# Patient Record
Sex: MALE | Race: WHITE | NOT HISPANIC OR LATINO | Employment: OTHER | URBAN - METROPOLITAN AREA
[De-identification: names, ages, dates, MRNs, and addresses within clinical notes are randomized per-mention and may not be internally consistent; named-entity substitution may affect disease eponyms.]

---

## 2017-03-21 ENCOUNTER — OFFICE VISIT - RIVER FALLS (OUTPATIENT)
Dept: FAMILY MEDICINE | Facility: CLINIC | Age: 62
End: 2017-03-21

## 2017-03-21 ASSESSMENT — MIFFLIN-ST. JEOR: SCORE: 1916.18

## 2017-03-23 ENCOUNTER — OFFICE VISIT - RIVER FALLS (OUTPATIENT)
Dept: FAMILY MEDICINE | Facility: CLINIC | Age: 62
End: 2017-03-23

## 2017-04-03 ENCOUNTER — OFFICE VISIT - RIVER FALLS (OUTPATIENT)
Dept: FAMILY MEDICINE | Facility: CLINIC | Age: 62
End: 2017-04-03

## 2017-04-03 ASSESSMENT — MIFFLIN-ST. JEOR: SCORE: 1886.25

## 2017-12-28 ENCOUNTER — OFFICE VISIT - RIVER FALLS (OUTPATIENT)
Dept: FAMILY MEDICINE | Facility: CLINIC | Age: 62
End: 2017-12-28

## 2017-12-28 ASSESSMENT — MIFFLIN-ST. JEOR: SCORE: 1896.23

## 2018-06-06 ENCOUNTER — OFFICE VISIT - RIVER FALLS (OUTPATIENT)
Dept: FAMILY MEDICINE | Facility: CLINIC | Age: 63
End: 2018-06-06

## 2018-06-06 ASSESSMENT — MIFFLIN-ST. JEOR: SCORE: 1931.61

## 2019-03-15 ENCOUNTER — COMMUNICATION - RIVER FALLS (OUTPATIENT)
Dept: FAMILY MEDICINE | Facility: CLINIC | Age: 64
End: 2019-03-15

## 2019-03-15 ENCOUNTER — OFFICE VISIT - RIVER FALLS (OUTPATIENT)
Dept: FAMILY MEDICINE | Facility: CLINIC | Age: 64
End: 2019-03-15

## 2019-03-15 LAB
ANION GAP SERPL CALCULATED.3IONS-SCNC: 10 MMOL/L (ref 5–18)
BASOPHILS # BLD MANUAL: 0 10*3/UL
BASOPHILS NFR BLD MANUAL: 0.5 %
BUN SERPL-MCNC: 19 MG/DL (ref 8–25)
BUN/CREAT RATIO - HISTORICAL: 21 (ref 10–20)
CALCIUM SERPL-MCNC: 10 MG/DL (ref 8.5–10.5)
CHLORIDE BLD-SCNC: 106 MMOL/L (ref 98–110)
CO2 SERPL-SCNC: 25 MMOL/L (ref 21–31)
CREAT SERPL-MCNC: 0.92 MG/DL (ref 0.72–1.25)
D DIMER PPP FEU-MCNC: 0.45
EOSINOPHIL # BLD MANUAL: 0.2 10*3/UL
EOSINOPHIL NFR BLD MANUAL: 3.7 %
ERYTHROCYTE [DISTWIDTH] IN BLOOD BY AUTOMATED COUNT: 13.4 % (ref 11.5–15.5)
GFR ESTIMATE EXT - HISTORICAL: >60
GLUCOSE BLD-MCNC: 96 MG/DL (ref 65–100)
HCT VFR BLD AUTO: 44 % (ref 37–53)
HGB BLD-MCNC: 14.9 G/DL (ref 13.5–17.5)
LYMPHOCYTES # BLD MANUAL: 1.7 10*3/UL (ref 0.9–2.9)
LYMPHOCYTES NFR BLD MANUAL: 26.7 %
MCH RBC QN AUTO: 30.2 PG (ref 26–34)
MCHC RBC AUTO-ENTMCNC: 33.9 G/DL (ref 32–36)
MCV RBC AUTO: 89 FL (ref 80–100)
MONOCYTES # BLD MANUAL: 0.5 10*3/UL
MONOCYTES NFR BLD MANUAL: 7.5 %
NEUTROPHILS # BLD MANUAL: 3.8 10*3/UL (ref 1.7–7)
NEUTROPHILS NFR BLD MANUAL: 61.6 %
PLATELET # BLD AUTO: 238 10*3/UL (ref 140–440)
PMV BLD: 9.7 FL (ref 6.5–11)
POTASSIUM BLD-SCNC: 4.4 MMOL/L (ref 3.5–5)
RBC # BLD AUTO: 4.93 10*6/UL (ref 4.3–5.9)
SODIUM SERPL-SCNC: 141 MMOL/L (ref 135–145)
TROPONIN I - HISTORICAL: 0.32 NG/ML
WBC # BLD AUTO: 6.2 10*3/UL (ref 4.5–11)

## 2019-03-15 ASSESSMENT — MIFFLIN-ST. JEOR: SCORE: 1951.56

## 2019-03-16 LAB
CHOLEST SERPL-MCNC: 318 MG/DL
CHOLEST/HDLC SERPL: 4.7 {RATIO}
HDLC SERPL-MCNC: 67 MG/DL
LDLC SERPL CALC-MCNC: 228 MG/DL
NONHDLC SERPL-MCNC: 251 MG/DL
TRIGL SERPL-MCNC: 99 MG/DL

## 2019-05-16 ENCOUNTER — AMBULATORY - RIVER FALLS (OUTPATIENT)
Dept: FAMILY MEDICINE | Facility: CLINIC | Age: 64
End: 2019-05-16

## 2019-05-16 ENCOUNTER — OFFICE VISIT - RIVER FALLS (OUTPATIENT)
Dept: FAMILY MEDICINE | Facility: CLINIC | Age: 64
End: 2019-05-16

## 2019-05-16 ASSESSMENT — MIFFLIN-ST. JEOR: SCORE: 1828.19

## 2019-05-17 LAB
CHOLEST SERPL-MCNC: 189 MG/DL
CHOLEST/HDLC SERPL: 3.8 {RATIO}
HDLC SERPL-MCNC: 50 MG/DL
LDLC SERPL CALC-MCNC: 125 MG/DL
NONHDLC SERPL-MCNC: 139 MG/DL
TRIGL SERPL-MCNC: 47 MG/DL

## 2019-07-23 ENCOUNTER — OFFICE VISIT - RIVER FALLS (OUTPATIENT)
Dept: FAMILY MEDICINE | Facility: CLINIC | Age: 64
End: 2019-07-23

## 2019-08-01 ENCOUNTER — OFFICE VISIT - RIVER FALLS (OUTPATIENT)
Dept: FAMILY MEDICINE | Facility: CLINIC | Age: 64
End: 2019-08-01

## 2019-09-03 ENCOUNTER — OFFICE VISIT - RIVER FALLS (OUTPATIENT)
Dept: FAMILY MEDICINE | Facility: CLINIC | Age: 64
End: 2019-09-03

## 2019-11-20 ENCOUNTER — OFFICE VISIT - RIVER FALLS (OUTPATIENT)
Dept: FAMILY MEDICINE | Facility: CLINIC | Age: 64
End: 2019-11-20

## 2019-11-20 ASSESSMENT — MIFFLIN-ST. JEOR: SCORE: 1827.28

## 2019-11-21 ENCOUNTER — OFFICE VISIT - RIVER FALLS (OUTPATIENT)
Dept: FAMILY MEDICINE | Facility: CLINIC | Age: 64
End: 2019-11-21

## 2019-11-21 LAB
B BURGDOR IGG+IGM SER QL: <0.9
BASOPHILS # BLD MANUAL: 39 10*3/UL (ref 0–200)
BASOPHILS NFR BLD MANUAL: 0.7 %
BUN SERPL-MCNC: 23 MG/DL (ref 7–25)
BUN/CREAT RATIO - HISTORICAL: NORMAL (ref 6–22)
CALCIUM SERPL-MCNC: 9.6 MG/DL (ref 8.6–10.3)
CHLORIDE BLD-SCNC: 105 MMOL/L (ref 98–110)
CO2 SERPL-SCNC: 30 MMOL/L (ref 20–32)
CREAT SERPL-MCNC: 1.1 MG/DL (ref 0.7–1.25)
EGFRCR SERPLBLD CKD-EPI 2021: 72 ML/MIN/1.73M2
EOSINOPHIL # BLD MANUAL: 230 10*3/UL (ref 15–500)
EOSINOPHIL NFR BLD MANUAL: 4.1 %
ERYTHROCYTE [DISTWIDTH] IN BLOOD BY AUTOMATED COUNT: 12.4 % (ref 11–15)
GLUCOSE BLD-MCNC: 86 MG/DL (ref 65–99)
HCT VFR BLD AUTO: 41.6 % (ref 38.5–50)
HGB BLD-MCNC: 14.3 GM/DL (ref 13.2–17.1)
LYMPHOCYTES # BLD MANUAL: 1428 10*3/UL (ref 850–3900)
LYMPHOCYTES NFR BLD MANUAL: 25.5 %
MCH RBC QN AUTO: 31.2 PG (ref 27–33)
MCHC RBC AUTO-ENTMCNC: 34.4 GM/DL (ref 32–36)
MCV RBC AUTO: 90.8 FL (ref 80–100)
MONOCYTES # BLD MANUAL: 347 10*3/UL (ref 200–950)
MONOCYTES NFR BLD MANUAL: 6.2 %
NEUTROPHILS # BLD MANUAL: 3556 10*3/UL (ref 1500–7800)
NEUTROPHILS NFR BLD MANUAL: 63.5 %
PLATELET # BLD AUTO: 278 10*3/UL (ref 140–400)
PMV BLD: 9.9 FL (ref 7.5–12.5)
POTASSIUM BLD-SCNC: 4.5 MMOL/L (ref 3.5–5.3)
RBC # BLD AUTO: 4.58 10*6/UL (ref 4.2–5.8)
SODIUM SERPL-SCNC: 141 MMOL/L (ref 135–146)
WBC # BLD AUTO: 5.6 10*3/UL (ref 3.8–10.8)

## 2019-11-21 ASSESSMENT — MIFFLIN-ST. JEOR: SCORE: 1837.26

## 2019-11-22 ENCOUNTER — COMMUNICATION - RIVER FALLS (OUTPATIENT)
Dept: FAMILY MEDICINE | Facility: CLINIC | Age: 64
End: 2019-11-22

## 2019-12-20 ENCOUNTER — OFFICE VISIT - RIVER FALLS (OUTPATIENT)
Dept: FAMILY MEDICINE | Facility: CLINIC | Age: 64
End: 2019-12-20

## 2019-12-20 ASSESSMENT — MIFFLIN-ST. JEOR: SCORE: 1869.92

## 2020-02-03 ENCOUNTER — OFFICE VISIT - RIVER FALLS (OUTPATIENT)
Dept: FAMILY MEDICINE | Facility: CLINIC | Age: 65
End: 2020-02-03

## 2020-03-11 ENCOUNTER — OFFICE VISIT - RIVER FALLS (OUTPATIENT)
Dept: FAMILY MEDICINE | Facility: CLINIC | Age: 65
End: 2020-03-11

## 2020-03-11 ASSESSMENT — MIFFLIN-ST. JEOR: SCORE: 1867.19

## 2021-01-27 ENCOUNTER — MEDICAL CORRESPONDENCE (OUTPATIENT)
Dept: HEALTH INFORMATION MANAGEMENT | Facility: CLINIC | Age: 66
End: 2021-01-27

## 2021-03-05 ENCOUNTER — OFFICE VISIT - RIVER FALLS (OUTPATIENT)
Dept: FAMILY MEDICINE | Facility: CLINIC | Age: 66
End: 2021-03-05

## 2021-03-05 ASSESSMENT — MIFFLIN-ST. JEOR: SCORE: 1969.71

## 2021-04-02 ENCOUNTER — OFFICE VISIT - RIVER FALLS (OUTPATIENT)
Dept: FAMILY MEDICINE | Facility: CLINIC | Age: 66
End: 2021-04-02

## 2021-04-02 ENCOUNTER — AMBULATORY - RIVER FALLS (OUTPATIENT)
Dept: FAMILY MEDICINE | Facility: CLINIC | Age: 66
End: 2021-04-02

## 2021-04-02 LAB
BUN SERPL-MCNC: 16 MG/DL (ref 7–25)
BUN/CREAT RATIO - HISTORICAL: ABNORMAL (ref 6–22)
CALCIUM SERPL-MCNC: 9.4 MG/DL (ref 8.6–10.3)
CHLORIDE BLD-SCNC: 104 MMOL/L (ref 98–110)
CO2 SERPL-SCNC: 26 MMOL/L (ref 20–32)
CREAT SERPL-MCNC: 0.8 MG/DL (ref 0.7–1.25)
EGFRCR SERPLBLD CKD-EPI 2021: 95 ML/MIN/1.73M2
GLUCOSE BLD-MCNC: 111 MG/DL (ref 65–99)
POTASSIUM BLD-SCNC: 4.4 MMOL/L (ref 3.5–5.3)
SODIUM SERPL-SCNC: 138 MMOL/L (ref 135–146)

## 2021-04-02 ASSESSMENT — MIFFLIN-ST. JEOR: SCORE: 1946.12

## 2021-04-03 LAB
CHOLEST SERPL-MCNC: 185 MG/DL
CHOLEST/HDLC SERPL: 2.6 {RATIO}
HDLC SERPL-MCNC: 72 MG/DL
LDLC SERPL CALC-MCNC: 99 MG/DL
NONHDLC SERPL-MCNC: 113 MG/DL
TRIGL SERPL-MCNC: 46 MG/DL

## 2021-04-04 ENCOUNTER — NURSE TRIAGE (OUTPATIENT)
Dept: NURSING | Facility: CLINIC | Age: 66
End: 2021-04-04

## 2021-04-04 NOTE — TELEPHONE ENCOUNTER
"\"I had my ear (R) cleaned out yesterday and they removed quite a bit of wax.   Today that ear is painful(rates a 5/10) and that side of my jaw hurts.   It's almost like I have lock jaw. I can open it but it hurts to do so.\"  Denies fever or other sx  Triaged, gave home care advice to try, if sx worsen advised to be seen today within 4 hours.  If improvement advised to call PCP in am 4/5.  Call back if needed.  Melissa Mejia RN [facility]    COVID 19 Nurse Triage Plan/Patient Instructions    Please be aware that novel coronavirus (COVID-19) may be circulating in the community. If you develop symptoms such as fever, cough, or SOB or if you have concerns about the presence of another infection including coronavirus (COVID-19), please contact your health care provider or visit [email or web address].     Disposition/Instructions    In-Person Visit with provider recommended. Reference Visit Selection Guide.    Thank you for taking steps to prevent the spread of this virus.  o Limit your contact with others.  o Wear a simple mask to cover your cough.  o Wash your hands well and often.    Resources    [facility]: About COVID-19: [email or web address]    CDC: What to Do If You're Sick: www.cdc.gov/coronavirus/2019-ncov/about/steps-when-sick.html    CDC: Ending Home Isolation: www.cdc.gov/coronavirus/2019-ncov/hcp/disposition-in-home-patients.html     CDC: Caring for Someone: www.cdc.gov/coronavirus/2019-ncov/if-you-are-sick/care-for-someone.html     [facility]: Interim Guidance for Hospital Discharge to Home: www.health.[facility].mn.us/diseases/coronavirus/hcp/hospdischarge.pdf    [facility] clinical trials (COVID-19 research studies): clinicalaffairs.[facility]/n-clinical-trials     Below are the COVID-19 hotlines at the [place] Department of Health ([facility]). Interpreters are available.   o For health questions: Call 763-301-8079 or 1-830.179.9378 (7 a.m. to 7 p.m.)  o For questions " about schools and childcare: Call 388-863-8372 or 1-229.214.4121 (7 a.m. to 7 p.m.)                     Additional Information    Negative: [1] Stiff neck (unable to touch chin to chest) AND [2] fever    Negative: [1] Bony area of skull behind the ear is pink or swollen AND [2] fever    Negative: Fever > 104 F (40 C)    Negative: Patient sounds very sick or weak to the triager    [1] SEVERE pain AND [2] not improved 2 hours after taking analgesic medication (e.g., ibuprofen or acetaminophen)    Protocols used: EARACHE-A-AH

## 2021-04-05 ENCOUNTER — COMMUNICATION - RIVER FALLS (OUTPATIENT)
Dept: FAMILY MEDICINE | Facility: CLINIC | Age: 66
End: 2021-04-05

## 2021-04-05 ENCOUNTER — OFFICE VISIT - RIVER FALLS (OUTPATIENT)
Dept: FAMILY MEDICINE | Facility: CLINIC | Age: 66
End: 2021-04-05

## 2021-04-05 ASSESSMENT — MIFFLIN-ST. JEOR: SCORE: 1950.66

## 2021-05-21 ENCOUNTER — OFFICE VISIT - RIVER FALLS (OUTPATIENT)
Dept: FAMILY MEDICINE | Facility: CLINIC | Age: 66
End: 2021-05-21

## 2021-05-21 ASSESSMENT — MIFFLIN-ST. JEOR: SCORE: 1920.27

## 2021-09-21 ENCOUNTER — OFFICE VISIT - RIVER FALLS (OUTPATIENT)
Dept: FAMILY MEDICINE | Facility: CLINIC | Age: 66
End: 2021-09-21

## 2021-12-28 ENCOUNTER — AMBULATORY - RIVER FALLS (OUTPATIENT)
Dept: FAMILY MEDICINE | Facility: CLINIC | Age: 66
End: 2021-12-28

## 2022-01-18 ENCOUNTER — COMMUNICATION - RIVER FALLS (OUTPATIENT)
Dept: FAMILY MEDICINE | Facility: CLINIC | Age: 67
End: 2022-01-18

## 2022-01-27 ENCOUNTER — OFFICE VISIT - RIVER FALLS (OUTPATIENT)
Dept: FAMILY MEDICINE | Facility: CLINIC | Age: 67
End: 2022-01-27

## 2022-01-28 LAB
BUN SERPL-MCNC: 17 MG/DL (ref 7–25)
BUN/CREAT RATIO - HISTORICAL: NORMAL (ref 6–22)
CALCIUM SERPL-MCNC: 9.4 MG/DL (ref 8.6–10.3)
CHLORIDE BLD-SCNC: 104 MMOL/L (ref 98–110)
CO2 SERPL-SCNC: 27 MMOL/L (ref 20–32)
CREAT SERPL-MCNC: 0.93 MG/DL (ref 0.7–1.25)
EGFRCR SERPLBLD CKD-EPI 2021: 86 ML/MIN/1.73M2
ERYTHROCYTE [DISTWIDTH] IN BLOOD BY AUTOMATED COUNT: 12 % (ref 11–15)
GLUCOSE BLD-MCNC: 87 MG/DL (ref 65–99)
HCT VFR BLD AUTO: 43.1 % (ref 38.5–50)
HGB BLD-MCNC: 14.7 GM/DL (ref 13.2–17.1)
MCH RBC QN AUTO: 30.9 PG (ref 27–33)
MCHC RBC AUTO-ENTMCNC: 34.1 GM/DL (ref 32–36)
MCV RBC AUTO: 90.5 FL (ref 80–100)
PLATELET # BLD AUTO: 257 10*3/UL (ref 140–400)
PMV BLD: 10.3 FL (ref 7.5–12.5)
POTASSIUM BLD-SCNC: 4.4 MMOL/L (ref 3.5–5.3)
RBC # BLD AUTO: 4.76 10*6/UL (ref 4.2–5.8)
SODIUM SERPL-SCNC: 139 MMOL/L (ref 135–146)
WBC # BLD AUTO: 8.3 10*3/UL (ref 3.8–10.8)

## 2022-01-31 ENCOUNTER — COMMUNICATION - RIVER FALLS (OUTPATIENT)
Dept: FAMILY MEDICINE | Facility: CLINIC | Age: 67
End: 2022-01-31

## 2022-02-08 ENCOUNTER — OFFICE VISIT - RIVER FALLS (OUTPATIENT)
Dept: FAMILY MEDICINE | Facility: CLINIC | Age: 67
End: 2022-02-08

## 2022-02-11 VITALS
HEART RATE: 60 BPM | BODY MASS INDEX: 30.46 KG/M2 | HEIGHT: 73 IN | HEIGHT: 73 IN | WEIGHT: 236.4 LBS | WEIGHT: 229.8 LBS | BODY MASS INDEX: 31.33 KG/M2 | SYSTOLIC BLOOD PRESSURE: 128 MMHG | SYSTOLIC BLOOD PRESSURE: 130 MMHG | HEART RATE: 68 BPM | TEMPERATURE: 98 F | TEMPERATURE: 98.4 F | DIASTOLIC BLOOD PRESSURE: 76 MMHG | DIASTOLIC BLOOD PRESSURE: 86 MMHG

## 2022-02-11 VITALS
HEIGHT: 73 IN | SYSTOLIC BLOOD PRESSURE: 104 MMHG | HEART RATE: 51 BPM | HEIGHT: 73 IN | BODY MASS INDEX: 28.76 KG/M2 | DIASTOLIC BLOOD PRESSURE: 90 MMHG | WEIGHT: 217 LBS | HEART RATE: 59 BPM | BODY MASS INDEX: 32.37 KG/M2 | TEMPERATURE: 97.1 F | SYSTOLIC BLOOD PRESSURE: 156 MMHG | WEIGHT: 244.2 LBS | DIASTOLIC BLOOD PRESSURE: 66 MMHG

## 2022-02-11 VITALS
HEART RATE: 60 BPM | SYSTOLIC BLOOD PRESSURE: 122 MMHG | SYSTOLIC BLOOD PRESSURE: 116 MMHG | TEMPERATURE: 97.8 F | BODY MASS INDEX: 27.97 KG/M2 | WEIGHT: 212 LBS | HEART RATE: 52 BPM | DIASTOLIC BLOOD PRESSURE: 68 MMHG | DIASTOLIC BLOOD PRESSURE: 78 MMHG

## 2022-02-11 VITALS
BODY MASS INDEX: 30.08 KG/M2 | DIASTOLIC BLOOD PRESSURE: 79 MMHG | WEIGHT: 228 LBS | HEART RATE: 60 BPM | TEMPERATURE: 98.1 F | SYSTOLIC BLOOD PRESSURE: 134 MMHG

## 2022-02-11 VITALS
SYSTOLIC BLOOD PRESSURE: 132 MMHG | DIASTOLIC BLOOD PRESSURE: 68 MMHG | SYSTOLIC BLOOD PRESSURE: 106 MMHG | HEIGHT: 73 IN | BODY MASS INDEX: 29.03 KG/M2 | HEART RATE: 73 BPM | WEIGHT: 216.8 LBS | WEIGHT: 212 LBS | TEMPERATURE: 98.6 F | HEART RATE: 65 BPM | BODY MASS INDEX: 28.1 KG/M2 | HEART RATE: 60 BPM | BODY MASS INDEX: 28.73 KG/M2 | HEIGHT: 73 IN | DIASTOLIC BLOOD PRESSURE: 72 MMHG | HEIGHT: 73 IN | WEIGHT: 219 LBS | SYSTOLIC BLOOD PRESSURE: 115 MMHG | DIASTOLIC BLOOD PRESSURE: 75 MMHG

## 2022-02-11 VITALS
DIASTOLIC BLOOD PRESSURE: 72 MMHG | TEMPERATURE: 98.9 F | WEIGHT: 226.2 LBS | HEIGHT: 73 IN | SYSTOLIC BLOOD PRESSURE: 122 MMHG | HEART RATE: 59 BPM | BODY MASS INDEX: 29.98 KG/M2

## 2022-02-11 VITALS
DIASTOLIC BLOOD PRESSURE: 88 MMHG | OXYGEN SATURATION: 95 % | HEART RATE: 60 BPM | HEIGHT: 73 IN | SYSTOLIC BLOOD PRESSURE: 138 MMHG | BODY MASS INDEX: 30.75 KG/M2 | WEIGHT: 232 LBS

## 2022-02-11 VITALS
WEIGHT: 239.8 LBS | HEART RATE: 64 BPM | HEIGHT: 73 IN | BODY MASS INDEX: 31.78 KG/M2 | DIASTOLIC BLOOD PRESSURE: 72 MMHG | SYSTOLIC BLOOD PRESSURE: 124 MMHG

## 2022-02-11 VITALS
BODY MASS INDEX: 32.34 KG/M2 | HEART RATE: 60 BPM | RESPIRATION RATE: 16 BRPM | HEIGHT: 73 IN | TEMPERATURE: 97.4 F | BODY MASS INDEX: 32.2 KG/M2 | WEIGHT: 248.2 LBS | DIASTOLIC BLOOD PRESSURE: 94 MMHG | DIASTOLIC BLOOD PRESSURE: 80 MMHG | BODY MASS INDEX: 32.89 KG/M2 | DIASTOLIC BLOOD PRESSURE: 76 MMHG | HEART RATE: 72 BPM | OXYGEN SATURATION: 97 % | SYSTOLIC BLOOD PRESSURE: 160 MMHG | TEMPERATURE: 98.3 F | HEIGHT: 73 IN | WEIGHT: 244 LBS | TEMPERATURE: 98.4 F | SYSTOLIC BLOOD PRESSURE: 126 MMHG | SYSTOLIC BLOOD PRESSURE: 149 MMHG | WEIGHT: 243 LBS | HEIGHT: 73 IN

## 2022-02-11 VITALS
HEART RATE: 81 BPM | BODY MASS INDEX: 29.9 KG/M2 | SYSTOLIC BLOOD PRESSURE: 135 MMHG | HEIGHT: 73 IN | WEIGHT: 225.6 LBS | TEMPERATURE: 98.2 F | DIASTOLIC BLOOD PRESSURE: 76 MMHG

## 2022-02-12 VITALS
DIASTOLIC BLOOD PRESSURE: 93 MMHG | WEIGHT: 237.3 LBS | HEART RATE: 66 BPM | HEIGHT: 73 IN | BODY MASS INDEX: 31.45 KG/M2 | SYSTOLIC BLOOD PRESSURE: 160 MMHG

## 2022-02-16 NOTE — PROGRESS NOTES
Patient:   TAMAR KENNEDY            MRN: [id number]            FIN: [id number]               Age:   66 years     Sex:  Male     :  1955   Associated Diagnoses:   History of non-ST elevation myocardial infarction (NSTEMI); Elevated lipids; Coronary artery disease; Asthma; HTN (hypertension)   Author:   Quinn Al MD      Visit Information      Date of Service: 2021 11:03 am  Performing Location: [facility]  Encounter#: [id number]      Primary Care Provider (PCP):  Quinn Al MD    NPI# [id number]      Referring Provider:  Quinn Al MD# [id number]      Chief Complaint   2021 11:08 AM CDT   HTN/CAD f/u and refills.        History of Present Illness   Patient here for follow-up overall has been doing well.  Continues to be very active.  He does to light she sometimes he is pain in his right lower lateral thigh anxiety hip replacement and.  No chest pain no shortness of breath.           Review of Systems   Constitutional:  Negative.    Eye:  Negative.    Ear/Nose/Mouth/Throat:  Negative.    Respiratory:  Negative.    Cardiovascular:  Negative.    Gastrointestinal:  Negative.    Genitourinary:  Negative.    Hematology/Lymphatics:  Negative.    Endocrine:  Negative.    Immunologic:  Negative.    Musculoskeletal:  Negative except as documented in history of present illness.    Integumentary:  Negative.    Neurologic:  Negative.       Health Status   Allergies:    Allergic Reactions (Selected)  No Known Medication Allergies   Medications:  (Selected)   Prescriptions  Prescribed  Advair Diskus 250 mcg-50 mcg inhalation powder: 1 puff(s), Inhale, bid, # 180 EA, 1 Refill(s), Type: Maintenance, Pharmacy: [facility] #[id number], 1 puff(s) Inhale bid, 73, in, 21 7:56:00 CDT, Height Measured, 228, lb, 21 11:08:00 CDT, Weight Measured  Ventolin HFA 90 mcg/inh inhalation aerosol: 2 puff(s), Inhale, q4-6 hrs, # 3 EA, 1 Refill(s), Type: Soft Stop,  Pharmacy: [facility] #[id number], 2 puff(s) Inhale q4-6 hrs, 73, in, 05/21/21 7:56:00 CDT, Height Measured, 228, lb, 09/21/21 11:08:00 CDT, Weight Measured  amLODIPine 5 mg oral tablet: = 1 tab(s) ( 5 mg ), Oral, daily, # 90 tab(s), 1 Refill(s), Type: Maintenance, Pharmacy: [facility] #[id number], 1 tab(s) Oral daily, 73, in, 05/21/21 7:56:00 CDT, Height Measured, 228, lb, 09/21/21 11:08:00 CDT, Weight Measured  atorvastatin 80 mg oral tablet: = 1 tab(s) ( 80 mg ), Oral, daily, # 90 tab(s), 1 Refill(s), Type: Maintenance, Pharmacy: [facility] #[id number], 1 tab(s) Oral daily, 73, in, 05/21/21 7:56:00 CDT, Height Measured, 228, lb, 09/21/21 11:08:00 CDT, Weight Measured  indomethacin 50 mg oral capsule: = 1 cap(s) ( 50 mg ), Oral, tid, PRN: for gout pain, # 30 cap(s), 0 Refill(s), Type: Maintenance, Pharmacy: [facility] #[id number], 1 cap(s) Oral tid,PRN:for gout pain, 73, in, 05/21/21 7:56:00 CDT, Height Measured, 237.3, lb, 05/21/21 7:56:00 CD...  losartan 50 mg oral tablet: = 1 tab(s) ( 50 mg ), Oral, daily, # 90 tab(s), 1 Refill(s), Type: Maintenance, Pharmacy: [facility] #[id number], 1 tab(s) Oral daily, 73, in, 05/21/21 7:56:00 CDT, Height Measured, 228, lb, 09/21/21 11:08:00 CDT, Weight Measured  Documented Medications  Documented  acetaminophen 325 mg oral tablet: = 2 tab(s) ( 650 mg ), Oral, q4 hrs, 0 Refill(s), Type: Maintenance  aspirin 81 mg oral tablet, chewable: = 1 tab(s) ( 81 mg ), PO, Daily, # 30 tab(s), 0 Refill(s), Type: Maintenance  ibuprofen 200 mg oral tablet: = 2 tab(s) ( 400 mg ), Oral, q4 hrs, PRN: for pain, # 120 tab(s), 0 Refill(s), Type: Maintenance  naproxen sodium 220 mg oral tablet: = 1 tab(s) ( 220 mg ), Oral, q8 hrs, PRN: for pain, # 30 tab(s), 0 Refill(s), Type: Maintenance  nitroglycerin 0.4 mg sublingual tablet: 0 Refill(s), Type: Soft Stop   Problem list:    All Problems (Selected)  Allergic rhinitis / SNOMED CT 135887713 / Confirmed  HTN (hypertension) /  SNOMED CT 4251300837 / Confirmed  Elevated lipids / SNOMED CT 902155711 / Confirmed  Obesity / SNOMED CT 0149798484 / Probable  History of non-ST elevation myocardial infarction (NSTEMI) / SNOMED CT 0378182224 / Confirmed  Osteoarthritis of right hip / SNOMED CT 4964807161 / Confirmed  Asthma / SNOMED CT [id number] / Confirmed  Family history of heart disease in male family member before age 55 / SNOMED CT 049755760 / Confirmed  Coronary artery disease / SNOMED CT 16866390 / Confirmed      Histories   Past Medical History:    Active  History of non-ST elevation myocardial infarction (NSTEMI) (2712281508): Onset on 3/15/2019 at 63 years.  Resolved  Inpatient stay ([id number]): Onset on 3/15/2019 at 63 years.  Resolved on 3/18/2019 at 63 years.  Comments:  3/27/2019 CDT 7:54 AM CDT - Shirlene Rodrigues  @[place], [place] - NSTEMI   Family History:    MI - Myocardial infarction  Father (Unknown, )     Procedure history:    Arthroscopy of hip (SNOMED CT 240537696) performed by Christopher Abraham MD on 2019 at 64 Years.  Comments:  2019 1:32 PM CST - Janel Momin  Total right.  Endovascular insertion of drug eluting stent (SNOMED CT 8211217041) on 3/18/2019 at 63 Years.  Comments:  3/27/2019 7:58 AM CDT - Shirlene Rodrigues  x1 to LAD   Social History:        Electronic Cigarette/Vaping Assessment            Electronic Cigarette Use: Never.      Alcohol Assessment            Current, Beer (12 oz), Wine (5 oz), 3-5 times per week, 1 drinks/episode average.  2 drinks/episode maximum.      Tobacco Assessment            Former smoker, quit more than 30 days ago, Cigars      Substance Abuse Assessment            Past      Employment and Education Assessment            Employed, Work/School description: .      Home and Environment Assessment            Spouse/Partner name: Cielo Polk.      Nutrition and Health Assessment            Type of diet: Regular.       Exercise and Physical Activity Assessment            Exercise frequency: 5-6 times/week.  Exercise type: Walking, Weight lifting.      Sexual Assessment            Sexually active: Yes.  Sexual orientation: Heterosexual.        Physical Examination   Measurements from flowsheet : Measurements   9/21/2021 11:08 AM CDT   Weight Measured - Standard                228 lb     General:  Alert and oriented, No acute distress.    HENT:  No pharyngeal erythema.    Neck:  Supple, Non-tender, No carotid bruit, No jugular venous distention, No lymphadenopathy, No thyromegaly.    Respiratory:  Lungs are clear to auscultation, Respirations are non-labored.    Cardiovascular:  Normal rate, Regular rhythm, No murmur, No edema.    Gastrointestinal:  Soft, Non-tender, No organomegaly.    Musculoskeletal:  degenerative changes noted.    Integumentary:  No rash.    Neurologic:  Alert, Oriented.       Impression and Plan   Diagnosis     History of non-ST elevation myocardial infarction (NSTEMI) (KVZ34-EL I25.2).     Elevated lipids (HKH59-QH E78.5).     Coronary artery disease (DJQ88-BS I25.10).     Asthma (LVB72-TS J45.909).     HTN (hypertension) (GVR35-TU I10).     Course:  Progressing as expected.    Plan:  1.  Hypertension under reasonable control with losartan and amlodipine we will make no changes #2 hyperlipidemia on atorvastatin  3.  History of non-STEMI MI status post stent placement in LAD  4.  History of right hip replacement occasional thigh pain by #5 asthma stable on Advair with rno use of albuterol  .    Patient Instructions:       Counseled: Patient, Regarding diagnosis, Regarding treatment, Regarding medications, Activity.    No qualifying data available.

## 2022-02-16 NOTE — NURSING NOTE
Asthma Assessment Entered On:  3/19/2019 8:13 AM CDT    Performed On:  3/15/2019 8:13 AM CDT by Jimena Rich MA               History   ED Visits Past Month Asthma :   0   ED Visits Past Year Asthma :   0   Hospitalizations Past Year Asthma :   0   Jimena Rich MA - 3/19/2019 8:13 AM CDT

## 2022-02-16 NOTE — NURSING NOTE
Comprehensive Intake Entered On:  9/3/2019 10:13 AM CDT    Performed On:  9/3/2019 10:11 AM CDT by Demi Lujan CMA               Summary   Chief Complaint :   right hip pain - having hip replaced in October and would like to see if he can get an injection    Weight Measured :   212 lb(Converted to: 212 lb 0 oz, 96.16 kg)    Systolic Blood Pressure :   120 mmHg   Diastolic Blood Pressure :   70 mmHg   Mean Arterial Pressure :   87 mmHg   Peripheral Pulse Rate :   60 bpm   Temperature Tympanic :   98.6 DegF(Converted to: 37.0 DegC)    Demi Lujan CMA - 9/3/2019 10:11 AM CDT

## 2022-02-16 NOTE — LETTER
(Inserted Image. Unable to display)   September 08, 2021  [place] [address]          Dear TAMAR,      Thank you for selecting [facility] for your healthcare needs.    Our records indicate you are due for the following services:     Hypertension check ~ please remember to bring your at-home blood pressure readings with you to your appointment.     (FYI   Regarding office visits: In some instances, a video visit or telephone visit may be offered as an option.)        To schedule an appointment or if you have further questions, please contact your clinic at [phone].      Powered by [employer]    Sincerely,    Quinn Al M.D.

## 2022-02-16 NOTE — NURSING NOTE
Comprehensive Intake Entered On:  4/2/2021 10:46 AM CDT    Performed On:  4/2/2021 10:43 AM CDT by Khushbu Du               Summary   Chief Complaint :   Right ear pain x1-2 weeks.  f/up HTN   Weight Measured :   243.0 lb(Converted to: 243 lb 0 oz, 110.223 kg)    Height Measured :   73 in(Converted to: 6 ft 1 in, 185.42 cm)    Body Mass Index :   32.06 kg/m2 (HI)    Body Surface Area :   2.38 m2   Systolic Blood Pressure :   160 mmHg (HI)    Diastolic Blood Pressure :   94 mmHg (HI)    Mean Arterial Pressure :   116 mmHg   Peripheral Pulse Rate :   549 bpm (HI)    BP Method :   Electronic   HR Method :   Electronic   Temperature Tympanic :   97.4 DegF(Converted to: 36.3 DegC)  (LOW)    Khushbu Du - 4/2/2021 10:43 AM CDT   Health Status   Allergies Verified? :   Yes   Medication History Verified? :   Yes   Tobacco Use? :   Former smoker   Khushbu Du - 4/2/2021 10:43 AM CDT   Meds / Allergies   (As Of: 4/2/2021 10:46:06 AM CDT)   Allergies (Active)   No Known Medication Allergies  Estimated Onset Date:   Unspecified ; Created By:   Demi Lujan CMA; Reaction Status:   Active ; Category:   Drug ; Substance:   No Known Medication Allergies ; Type:   Allergy ; Updated By:   Demi Lujan CMA; Reviewed Date:   3/5/2021 11:56 AM CST        Medication List   (As Of: 4/2/2021 10:46:06 AM CDT)   Prescription/Discharge Order    fluticasone-salmeterol  :   fluticasone-salmeterol ; Status:   Prescribed ; Ordered As Mnemonic:   Advair Diskus 250 mcg-50 mcg inhalation powder ; Simple Display Line:   1 puff(s), Inhale, bid, 180 EA, 1 Refill(s) ; Ordering Provider:   Quinn Al MD; Catalog Code:   fluticasone-salmeterol ; Order Dt/Tm:   3/5/2021 11:37:58 AM CST          losartan  :   losartan ; Status:   Prescribed ; Ordered As Mnemonic:   losartan 50 mg oral tablet ; Simple Display Line:   50 mg, 1 tab(s), Oral, daily, 90 tab(s), 1 Refill(s) ; Ordering Provider:   Quinn Al MD; Catalog Code:    losartan ; Order Dt/Tm:   3/5/2021 11:34:49 AM CST          atorvastatin  :   atorvastatin ; Status:   Prescribed ; Ordered As Mnemonic:   atorvastatin 80 mg oral tablet ; Simple Display Line:   80 mg, 1 tab(s), Oral, daily, 90 tab(s), 3 Refill(s) ; Ordering Provider:   Quinn Al MD; Catalog Code:   atorvastatin ; Order Dt/Tm:   3/11/2020 9:12:17 AM CDT          albuterol  :   albuterol ; Status:   Prescribed ; Ordered As Mnemonic:   Ventolin HFA 90 mcg/inh inhalation aerosol ; Simple Display Line:   2 puff(s), Inhale, q4-6 hrs, 3 EA, 3 Refill(s) ; Ordering Provider:   Quinn Al MD; Catalog Code:   albuterol ; Order Dt/Tm:   3/11/2020 9:11:06 AM CDT            Home Meds    aspirin  :   aspirin ; Status:   Documented ; Ordered As Mnemonic:   aspirin 81 mg oral tablet, chewable ; Simple Display Line:   81 mg, 1 tab(s), PO, Daily, 30 tab(s), 0 Refill(s) ; Catalog Code:   aspirin ; Order Dt/Tm:   3/19/2019 1:54:21 PM CDT            ID Risk Screen   Recent Travel History :   No recent travel   Family Member Travel History :   No recent travel   Other Exposure to Infectious Disease :   Unknown   COVID-19 Testing Status :   No positive COVID-19 test   Khushbu Du - 4/2/2021 10:43 AM CDT   Social History   Social History   (As Of: 4/2/2021 10:46:06 AM CDT)   Alcohol:        Current, Beer (12 oz), Wine (5 oz), 3-5 times per week, 1 drinks/episode average.  2 drinks/episode maximum.   (Last Updated: 3/5/2015 12:34:58 PM CST by uLisa Null CMA)          Tobacco:        Former smoker, quit more than 30 days ago, Cigars   (Last Updated: 3/5/2021 11:26:49 AM CST by Khushbu Du)          Electronic Cigarette/Vaping:        Electronic Cigarette Use: Never.   (Last Updated: 3/5/2021 11:26:53 AM CST by Khushbu Du)          Substance Abuse:        Past   (Last Updated: 3/5/2015 12:35:17 PM CST by Luisa Null CMA)          Employment/School:        Employed, Work/School description: [employer]    (Last Updated: 3/5/2015 12:35:30 PM CST by Chaka ORNELAS Luisa)          Home/Environment:        Spouse/Partner name: Cielo Polk.   (Last Updated: 3/5/2015 12:36:01 PM CST by Chaka ORNELAS Luisa)          Nutrition/Health:        Type of diet: Regular.   (Last Updated: 3/5/2015 12:36:10 PM CST by Chaka ORNELAS Luisa)          Exercise:        Exercise frequency: 5-6 times/week.  Exercise type: Walking, Weight lifting.   (Last Updated: 3/5/2015 12:36:45 PM CST by Chaka ORNELAS Luisa)          Sexual:        Sexually active: Yes.  Sexual orientation: Heterosexual.   (Last Updated: 3/5/2015 12:37:02 PM CST by Chaka ORNELAS Luisa)

## 2022-02-16 NOTE — PROGRESS NOTES
Chief Complaint  groin strain onset Friday  History of Present Illness  Pain in the right groin. Started Saturday and increased over the weekend. Denies any obvious trauma or trigger. Friday working hard on removing a shrub. Does not hurt at rest.  Review of Systems  Denies fevers, vomiting. No weakness in the legs.  No urine changes.?  Slept intermittently last night.  Problem List/Past Medical History  Ongoing  Asthma  Obesity  Resolved  No resolved problems  Procedure/Surgical History  Home Medications  Qvar 40 mcg/inh inhalation aerosol, inh, bid  Ventolin HFA 90 mcg/inh inhalation aerosol, 2 puff(s), inh, q4-6 hrs, 11 refills  Allergies  No known allergies  Social History  Alcohol  Current, Beer (12 oz), Wine (5 oz), 3-5 times per week, 1 drinks/episode average. 2 drinks/episode maximum.  Exercise and Physical Activity  Exercise frequency: 5-6 times/week. Exercise type: Walking, Weight lifting.  Family History  Immunizations  Physical Exam  Vitals & Measurements  T:?98.4?(Tympanic)?  HR:?60?(Peripheral)?  BP:?128/86?  HT:?73?in?  WT:?229.8?lb?  BMI:?30.32?  General: no acute distress  Musculoskeletal: At rest is good but any movement causes pain. ?Point tenderness on the right inner thigh at the pubic bone  Genitourinary: Normal testicles and scrotum. ?No inguinal tenderness  Abdomen: soft, nontender, nondistended  Lab Results  Diagnostic Results  Doubt inguinal hernia or testicular torsion.  Assessment/Plan  Musculoskeletal pain  Likely muscle strain and will start Naprosyn 500 mg twice daily. ?Follow-up if not improving.?

## 2022-02-16 NOTE — TELEPHONE ENCOUNTER
---------------------  From: Salma Ramos   Sent: 4/16/2020 9:09:08 AM CDT  Subject: Blood thinner      Phone Message    PCP:   SHARON                          Time of Call:  845am                                        Person Calling:  Pt  Phone number:       Returned call at: 907am    Note:   Pt called wondering about refills on Clopidogrel. Informed him that he would need to contact Dr. Thakur's office. Gave him the phone number. He will contact her office to discuss.     Last office visit and reason:  03/11/2020; IVD/Asthma

## 2022-02-16 NOTE — NURSING NOTE
Comprehensive Intake Entered On:  5/16/2019 8:42 AM CDT    Performed On:  5/16/2019 8:38 AM CDT by Chantell Garcia               Summary   Chief Complaint :   Post op with cardio.    Weight Measured :   217 lb(Converted to: 217 lb 0 oz, 98.43 kg)    Height Measured :   73 in(Converted to: 6 ft 1 in, 185.42 cm)    Body Mass Index :   28.63 kg/m2 (HI)    Body Surface Area :   2.25 m2   Systolic Blood Pressure :   104 mmHg   Diastolic Blood Pressure :   66 mmHg   Mean Arterial Pressure :   79 mmHg   Peripheral Pulse Rate :   59 bpm (LOW)    Chantell Garcia - 5/16/2019 8:38 AM CDT   Meds / Allergies   (As Of: 5/16/2019 8:42:58 AM CDT)   Allergies (Active)   No known allergies  Estimated Onset Date:   Unspecified ; Created By:   Pily Cooper CMA; Reaction Status:   Active ; Category:   Drug ; Substance:   No known allergies ; Type:   Allergy ; Updated By:   Pily Cooper CMA; Reviewed Date:   5/16/2019 8:42 AM CDT        Medication List   (As Of: 5/16/2019 8:42:58 AM CDT)   Prescription/Discharge Order    albuterol  :   albuterol ; Status:   Prescribed ; Ordered As Mnemonic:   Ventolin HFA 90 mcg/inh inhalation aerosol ; Simple Display Line:   See Instructions, INHALE 2 PUFFS BY MOUTH EVERY 4 TO 6 HOURS, 1 EA, 11 Refill(s) ; Ordering Provider:   Seth Mckeon MD; Catalog Code:   albuterol ; Order Dt/Tm:   6/6/2018 11:04:15 AM            Home Meds    aspirin  :   aspirin ; Status:   Documented ; Ordered As Mnemonic:   aspirin 81 mg oral tablet, chewable ; Simple Display Line:   81 mg, 1 tab(s), PO, Daily, 30 tab(s), 0 Refill(s) ; Catalog Code:   aspirin ; Order Dt/Tm:   3/19/2019 1:54:21 PM          atorvastatin  :   atorvastatin ; Status:   Documented ; Ordered As Mnemonic:   atorvastatin 40 mg oral tablet ; Simple Display Line:   40 mg, 1 tab(s), Oral, daily, 0 Refill(s) ; Catalog Code:   atorvastatin ; Order Dt/Tm:   3/19/2019 1:56:39 PM          ticagrelor  :   ticagrelor ; Status:    Documented ; Ordered As Mnemonic:   ticagrelor 90 mg oral tablet ; Simple Display Line:   90 mg, 1 tab(s), Oral, bid, 0 Refill(s) ; Catalog Code:   ticagrelor ; Order Dt/Tm:   3/19/2019 1:58:36 PM          lisinopril  :   lisinopril ; Status:   Documented ; Ordered As Mnemonic:   lisinopril 10 mg oral tablet ; Simple Display Line:   10 mg, 1 tab(s), Oral, daily, 0 Refill(s) ; Catalog Code:   lisinopril ; Order Dt/Tm:   3/19/2019 1:57:49 PM

## 2022-02-16 NOTE — PROGRESS NOTES
Patient:   TAMAR KENNEDY            MRN: [id number]            FIN: [id number]               Age:   65 years     Sex:  Male     :  1955   Associated Diagnoses:   Right otitis externa   Author:   Christopher Blanco PA-C      Chief Complaint   2021 10:37 AM CDT    Pt here for FU on Right ear pain and from  and  urgent care telephone visit for RIght jaw pain.  Pt states ear and jaw pain.        History of Present Illness   Chief complaint and symptoms noted above and confirmed with patient   as above, he had his ears flushed on , on  developed right ear pain radiating to right jaw  has been using tylenol and heat      Review of Systems   Constitutional:  Negative.    Ear/Nose/Mouth/Throat:       Ear pain: Right.    Respiratory:  Negative.    Gastrointestinal:  Negative.       Health Status   Allergies:    Allergic Reactions (Selected)  No Known Medication Allergies   Medications:  (Selected)   Prescriptions  Prescribed  Advair Diskus 250 mcg-50 mcg inhalation powder: 1 puff(s), Inhale, bid, # 180 EA, 1 Refill(s), Type: Maintenance, Pharmacy: [facility] #[id number], 1 puff(s) Inhale bid, 73, in, 21 10:43:00 CDT, Height Measured, 243, lb, 21 10:43:00 CDT, Weight Measured  Ventolin HFA 90 mcg/inh inhalation aerosol: 2 puff(s), Inhale, q4-6 hrs, # 3 EA, 1 Refill(s), Type: Soft Stop, Pharmacy: [facility] #[id number], 2 puff(s) Inhale q4-6 hrs, 73, in, 21 10:43:00 CDT, Height Measured, 243, lb, 21 10:43:00 CDT, Weight Measured  amLODIPine 5 mg oral tablet: = 1 tab(s) ( 5 mg ), Oral, daily, # 90 tab(s), 1 Refill(s), Type: Maintenance, Pharmacy: [facility] #[id number], 1 tab(s) Oral daily, 73, in, 21 10:43:00 CDT, Height Measured, 243, lb, 21 10:43:00 CDT, Weight Measured  atorvastatin 80 mg oral tablet: = 1 tab(s) ( 80 mg ), Oral, daily, # 90 tab(s), 1 Refill(s), Type: Maintenance, Pharmacy: [facility] #[id number], 1 tab(s) Oral daily, 73, in, 21  10:43:00 CDT, Height Measured, 243, lb, 04/02/21 10:43:00 CDT, Weight Measured  losartan 50 mg oral tablet: = 1 tab(s) ( 50 mg ), Oral, daily, # 90 tab(s), 1 Refill(s), Type: Maintenance, [id number], 1 tab(s) Oral daily, 73, in, 04/02/21 10:43:00 CDT, Height Measured, 243, lb, 04/02/21 10:43:00 CDT, Weight Measured  Documented Medications  Documented  acetaminophen 325 mg oral tablet: = 2 tab(s) ( 650 mg ), Oral, q4 hrs, 0 Refill(s), Type: Maintenance  aspirin 81 mg oral tablet, chewable: = 1 tab(s) ( 81 mg ), PO, Daily, # 30 tab(s), 0 Refill(s), Type: Maintenance  ibuprofen 200 mg oral tablet: = 2 tab(s) ( 400 mg ), Oral, q4 hrs, PRN: for pain, # 120 tab(s), 0 Refill(s), Type: Maintenance  naproxen sodium 220 mg oral tablet: = 1 tab(s) ( 220 mg ), Oral, q8 hrs, PRN: for pain, # 30 tab(s), 0 Refill(s), Type: Maintenance  nitroglycerin 0.4 mg sublingual tablet: 0 Refill(s), Type: Soft Stop   Problem list:    All Problems (Selected)  Allergic rhinitis / SNOMED CT 534409713 / Confirmed  HTN (hypertension) / SNOMED CT 6023733926 / Confirmed  Elevated lipids / SNOMED CT 618189414 / Confirmed  Obesity / SNOMED CT 3446764529 / Probable  History of non-ST elevation myocardial infarction (NSTEMI) / SNOMED CT 2533611029 / Confirmed  Osteoarthritis of right hip / SNOMED CT 4063161345 / Confirmed  Asthma / SNOMED CT [id number] / Confirmed  Family history of heart disease in male family member before age 55 / SNOMED CT 810349650 / Confirmed  Coronary artery disease / SNOMED CT 27053047 / Confirmed      Histories   Past Medical History:    Active  History of non-ST elevation myocardial infarction (NSTEMI) ([id number]): Onset on 3/15/2019 at 63 years.  Resolved  Inpatient stay ([id number]): Onset on 3/15/2019 at 63 years.  Resolved on 3/18/2019 at 63 years.  Comments:  3/27/2019 CDT 7:54 AM CDT - Shirlene Rodrigues  @[place], [place] - NSTEMI   Family History:    MI -  Myocardial infarction  Father (Unknown, )     Procedure history:    Arthroscopy of hip ([id number]) on 2019 at 64 Years.  Comments:  2019 1:32 PM CST - Janel Momin  Total right.  Endovascular insertion of drug eluting stent ([id number]) on 3/18/2019 at 63 Years.  Comments:  3/27/2019 7:58 AM CDT - Shirlene Rodrigues  x1 to LAD      Physical Examination   Vital Signs   2021 10:37 AM CDT Temperature Tympanic 98.4 DegF    Peripheral Pulse Rate 60 bpm    Pulse Site Radial artery    Respiratory Rate 16 br/min    Systolic Blood Pressure 126 mmHg    Diastolic Blood Pressure 76 mmHg    Mean Arterial Pressure 93 mmHg      Measurements from flowsheet : Measurements   2021 10:37 AM CDT Height Measured - Standard 73 in    Weight Measured - Standard 244 lb    BSA 2.39 m2    Body Mass Index 32.19 kg/m2  HI      General:  No acute distress.    HENT:  Tympanic membranes are clear, No pharyngeal erythema, No sinus tenderness, some inflammation of right ear canal, tender over right TMJ which limits his mouth opening.    Neck:  Supple, Non-tender, No lymphadenopathy.    Respiratory:  Lungs are clear to auscultation.    Cardiovascular:  Normal rate, Regular rhythm, No murmur.       Impression and Plan   Diagnosis     Right otitis externa (KPU90-OA H60.91).     Summary:  right ear pain has progressed rapidly and now radiating into right TMJ, will treat with cortisporin otic suspension and oral amoxicillin  continue with tylenol and heat  follow up if not improving.    Orders     Orders   Pharmacy:  hydrocortisone/neomycin/polymyxin B 1%-0.35%-10,000 units/mL otic suspension (Prescribe): 4 drop(s), Ear-Right, qid, x 7 day(s), # 10 mL, 0 Refill(s), Type: Acute, [id number], 4 drop(s) Ear-Right qid,x7 day(s), 73, in, 2021 10:37 AM CDT, Height Measured, 244, lb, 2021 10:37 AM CDT, Weight Measured  amoxicillin 875 mg oral tablet (Prescribe): = 1 tab(s) ( 875 mg ), Oral, bid, x 7  day(s), # 14 tab(s), 0 Refill(s), Type: Acute, Pharmacy: [facility] #[id number], 1 tab(s) Oral bid,x7 day(s), 73, in, 4/5/2021 10:37 AM CDT, Height Measured, 244, lb, 4/5/2021 10:37 AM CDT, Weight Measured.     Orders   Charges (Evaluation and Management):  [id number] office o/p est low 20-29 min (Charge) (Order): Quantity: 1, Right otitis externa.

## 2022-02-16 NOTE — NURSING NOTE
Comprehensive Intake Entered On:  5/21/2021 8:00 AM CDT    Performed On:  5/21/2021 7:56 AM CDT by Gwendolyn Winters CMA               Summary   Chief Complaint :   Right foot painful, hard to sleep at night x few days   Weight Measured :   237.3 lb(Converted to: 237 lb 5 oz, 107.637 kg)    Height Measured :   73 in(Converted to: 6 ft 1 in, 185.42 cm)    Body Mass Index :   31.3 kg/m2 (HI)    Body Surface Area :   2.35 m2   Systolic Blood Pressure :   160 mmHg (HI)    Diastolic Blood Pressure :   93 mmHg (HI)    Mean Arterial Pressure :   115 mmHg   Peripheral Pulse Rate :   66 bpm   BP Site :   Right arm   BP Method :   Electronic   HR Method :   Electronic   Gwendolyn Winters CMA - 5/21/2021 7:56 AM CDT   Health Status   Allergies Verified? :   Yes   Medication History Verified? :   Yes   Medical History Verified? :   Yes   Tobacco Use? :   Former smoker   Gwendolyn Winters CMA - 5/21/2021 7:56 AM CDT   Consents   Consent for Immunization Exchange :   Consent Granted   Consent for Immunizations to Providers :   Consent Granted   Gwendolyn Winters CMA - 5/21/2021 7:56 AM CDT   Meds / Allergies   (As Of: 5/21/2021 8:00:53 AM CDT)   Allergies (Active)   No Known Medication Allergies  Estimated Onset Date:   Unspecified ; Created By:   Demi Lujan CMA; Reaction Status:   Active ; Category:   Drug ; Substance:   No Known Medication Allergies ; Type:   Allergy ; Updated By:   Demi Lujan CMA; Reviewed Date:   5/21/2021 7:59 AM CDT        Medication List   (As Of: 5/21/2021 8:00:53 AM CDT)   Prescription/Discharge Order    albuterol  :   albuterol ; Status:   Prescribed ; Ordered As Mnemonic:   Ventolin HFA 90 mcg/inh inhalation aerosol ; Simple Display Line:   2 puff(s), Inhale, q4-6 hrs, 3 EA, 1 Refill(s) ; Ordering Provider:   Quinn Al MD; Catalog Code:   albuterol ; Order Dt/Tm:   4/2/2021 11:29:44 AM CDT          fluticasone-salmeterol  :   fluticasone-salmeterol ; Status:   Prescribed ; Ordered As Mnemonic:   Advair  Diskus 250 mcg-50 mcg inhalation powder ; Simple Display Line:   1 puff(s), Inhale, bid, 180 EA, 1 Refill(s) ; Ordering Provider:   Quinn Al MD; Catalog Code:   fluticasone-salmeterol ; Order Dt/Tm:   4/2/2021 11:29:43 AM CDT          atorvastatin  :   atorvastatin ; Status:   Prescribed ; Ordered As Mnemonic:   atorvastatin 80 mg oral tablet ; Simple Display Line:   80 mg, 1 tab(s), Oral, daily, 90 tab(s), 1 Refill(s) ; Ordering Provider:   Quinn Al MD; Catalog Code:   atorvastatin ; Order Dt/Tm:   4/2/2021 11:30:25 AM CDT          losartan  :   losartan ; Status:   Prescribed ; Ordered As Mnemonic:   losartan 50 mg oral tablet ; Simple Display Line:   50 mg, 1 tab(s), Oral, daily, 90 tab(s), 1 Refill(s) ; Ordering Provider:   Quinn Al MD; Catalog Code:   losartan ; Order Dt/Tm:   4/2/2021 11:30:24 AM CDT          amLODIPine  :   amLODIPine ; Status:   Prescribed ; Ordered As Mnemonic:   amLODIPine 5 mg oral tablet ; Simple Display Line:   5 mg, 1 tab(s), Oral, daily, 90 tab(s), 1 Refill(s) ; Ordering Provider:   Quinn Al MD; Catalog Code:   amLODIPine ; Order Dt/Tm:   4/2/2021 10:58:11 AM CDT            Home Meds    nitroglycerin  :   nitroglycerin ; Status:   Documented ; Ordered As Mnemonic:   nitroglycerin 0.4 mg sublingual tablet ; Simple Display Line:   0 Refill(s) ; Catalog Code:   nitroglycerin ; Order Dt/Tm:   4/5/2021 10:35:22 AM CDT ; Comment:   Responsible Provider: JOSEE HOLT          acetaminophen  :   acetaminophen ; Status:   Documented ; Ordered As Mnemonic:   acetaminophen 325 mg oral tablet ; Simple Display Line:   650 mg, 2 tab(s), Oral, q4 hrs, 0 Refill(s) ; Catalog Code:   acetaminophen ; Order Dt/Tm:   4/5/2021 10:36:14 AM CDT          ibuprofen  :   ibuprofen ; Status:   Documented ; Ordered As Mnemonic:   ibuprofen 200 mg oral tablet ; Simple Display Line:   400 mg, 2 tab(s), Oral, q4 hrs, PRN: for pain, 120 tab(s), 0 Refill(s) ; Catalog  Code:   ibuprofen ; Order Dt/Tm:   4/5/2021 10:37:02 AM CDT          aspirin  :   aspirin ; Status:   Documented ; Ordered As Mnemonic:   aspirin 81 mg oral tablet, chewable ; Simple Display Line:   81 mg, 1 tab(s), PO, Daily, 30 tab(s), 0 Refill(s) ; Catalog Code:   aspirin ; Order Dt/Tm:   3/19/2019 1:54:21 PM CDT          naproxen  :   naproxen ; Status:   Documented ; Ordered As Mnemonic:   naproxen sodium 220 mg oral tablet ; Simple Display Line:   220 mg, 1 tab(s), Oral, q8 hrs, PRN: for pain, 30 tab(s), 0 Refill(s) ; Catalog Code:   naproxen ; Order Dt/Tm:   4/5/2021 10:37:16 AM CDT            ID Risk Screen   Recent Travel History :   No recent travel   Family Member Travel History :   No recent travel   Other Exposure to Infectious Disease :   Unknown   COVID-19 Testing Status :   No COVID-19 test performed   Gwendolyn Winters CMA 5/21/2021 7:56 AM CDT

## 2022-02-16 NOTE — TELEPHONE ENCOUNTER
---------------------  From: Pura Ortega (Phone Messages Pool ([id number]_[facility]))   To: [facility] Message Pool ([id number]_[place] [facility] [place]);     Sent: 3/14/2019 6:35:48 PM CDT  Subject: FYI heart burn, SOB concern     PCP:   JENNIFER, seeing TFS tomorrow      Time of Call:  0364       Person Calling:  Pt  Phone number:  [phone]    Returned call at: [phone]    Note:   Pt LM stating he has appt at 1:00 tomorrow with TFS for physical, but also experiencing heart burn and SOB- wondering if he should be concerned.    Called pt back. States sxs have been going on x3-4 days. Informed him that normally somebody experiencing heart burn sensation with SOB we would recommend going to the ER. States that the SOB isn't unusual for him, especially with all the shoveling he has been doing. Pt has asthma and I informed him that could be contributing to the SOB and he agreed. Since onset was 3-4days ago I told him he could have TFS evaluate his sxs at appt tomorrow, but if he is having worsening sxs, chest pain, or other unusual sxs to call ambulance/go to ER. Pt verbalized understanding and agreed with plan.     Last office visit and reason:  6/6/18 asthma---------------------  From: Khushbu Du ([facility] Message Pool ([id number]_[facility]))   To: Quinn Al MD;     Sent: 3/15/2019 7:40:04 AM CDT  Subject: FW: FYI heart burn, SOB concern     FYI

## 2022-02-16 NOTE — PROGRESS NOTES
Patient:   TAMAR KENNEDY            MRN: [id number]            FIN: [id number]               Age:   64 years     Sex:  Male     :  1955   Associated Diagnoses:   Osteoarthritis of right hip   Author:   Quinn Al MD      Visit Information      Date of Service: 2019 09:49 am  Performing Location: [facility]  Encounter#: [id number]      Primary Care Provider (PCP):  Quinn Al MD    NPI# [id number]      Referring Provider:  Quinn Al MD, NPI# [id number]      Chief Complaint   2019 9:55 AM [facility] f/up, post hip replacement.        History of Present Illness   chief complaint and symptoms as noted above confirmed with patient   doing well pain controlled  using walker           Review of Systems   Constitutional:  Negative except as documented in history of present illness.    Musculoskeletal:  Negative except as documented in history of present illness.    Integumentary:  Negative.    Neurologic:  Negative.       Health Status   Allergies:    Allergic Reactions (Selected)  No Known Medication Allergies   Medications:  (Selected)   Prescriptions  Prescribed  Ventolin HFA 90 mcg/inh inhalation aerosol: 2 puff(s), Inhale, q4-6 hrs, # 3 EA, 3 Refill(s), Type: Soft Stop, Pharmacy: [facility] #[id number], 2 puff(s) Inhale q4-6 hrs  Documented Medications  Documented  MiraLax oral powder for reconstitution: ( 17 gm ), Oral, daily, Instructions: RX'd by [facility], 0 Refill(s), Type: Maintenance  acetaminophen 500 mg oral tablet: = 2 tab(s) ( 1,000 mg ), Oral, q6 hrs, Instructions: RX'd by [facility], PRN: for pain, 0 Refill(s), Type: Maintenance  aspirin 81 mg oral tablet, chewable: = 1 tab(s) ( 81 mg ), PO, Daily, # 30 tab(s), 0 Refill(s), Type: Maintenance  atorvastatin 80 mg oral tablet: 0 Refill(s), Type: Soft Stop  clopidogrel 75 mg oral tablet: = 1 tab(s) ( 75 mg ), Oral, daily, 0 Refill(s), Type: Maintenance  lisinopril 10 mg oral tablet: = 1 tab(s) ( 10 mg  ), Oral, daily, 0 Refill(s), Type: Maintenance  oxyCODONE 5 mg oral tablet: = 1 tab(s) ( 5 mg ), Oral, q4 hrs, Instructions: RX'd by [facility], PRN: for pain, 0 Refill(s), Type: Maintenance,    Medications          *denotes recorded medication          *acetaminophen 500 mg oral tablet: 1,000 mg, 2 tab(s), Oral, q6 hrs, RX'd by [facility], PRN: for pain, 0 Refill(s).          Ventolin HFA 90 mcg/inh inhalation aerosol: 2 puff(s), Inhale, q4-6 hrs, 3 EA, 3 Refill(s).          *aspirin 81 mg oral tablet, chewable: 81 mg, 1 tab(s), PO, Daily, 30 tab(s), 0 Refill(s).          *atorvastatin 80 mg oral tablet: 0 Refill(s).          *clopidogrel 75 mg oral tablet: 75 mg, 1 tab(s), Oral, daily, 0 Refill(s).          *lisinopril 10 mg oral tablet: 10 mg, 1 tab(s), Oral, daily, 0 Refill(s).          *oxyCODONE 5 mg oral tablet: 5 mg, 1 tab(s), Oral, q4 hrs, RX'd by [facility], PRN: for pain, 0 Refill(s).          *MiraLax oral powder for reconstitution: 17 gm, Oral, daily, for 30 day(s), RX'd by [facility], 0 Refill(s).       Problem list:    All Problems (Selected)  Allergic rhinitis / SNOMED CT 272546847 / Confirmed  Asthma / SNOMED CT [id number] / Confirmed  Family history of heart disease in male family member before age 55 / SNOMED CT 288497556 / Confirmed  History of non-ST elevation myocardial infarction (NSTEMI) / SNOMED CT 5220770196 / Confirmed  Elevated lipids / SNOMED CT 363360034 / Confirmed  Obesity / SNOMED CT 8248697493 / Probable  Osteoarthritis of right hip / SNOMED CT 3017701173 / Confirmed      Histories   Past Medical History:    Active  History of non-ST elevation myocardial infarction (NSTEMI) (8054519547): Onset on 3/15/2019 at 63 years.  Resolved  Inpatient stay ([id number]): Onset on 3/15/2019 at 63 years.  Resolved on 3/18/2019 at 63 years.  Comments:  3/27/2019 CDT 7:54 AM CDT - Shirlene Rodrigues  @[email or web address], [place] - NSTEMI   Family History:    MI - Myocardial infarction  Father  (Unknown, )     Procedure history:    Arthroscopy of hip (SNOMED CT 229611370) performed by Christopher Abraham MD on 2019 at 64 Years.  Comments:  2019 1:32 PM CST - Janel Momin  Total right.  Endovascular insertion of drug eluting stent (SNOMED CT 2678630313) on 3/18/2019 at 63 Years.  Comments:  3/27/2019 7:58 AM CDT - Shirlene Rodrigues  x1 to LAD   Social History:        Alcohol Assessment            Current, Beer (12 oz), Wine (5 oz), 3-5 times per week, 1 drinks/episode average.  2 drinks/episode maximum.      Tobacco Assessment            Past      Substance Abuse Assessment            Past      Employment and Education Assessment            Employed, Work/School description: .      Home and Environment Assessment            Spouse/Partner name: Cielo Polk.      Nutrition and Health Assessment            Type of diet: Regular.      Exercise and Physical Activity Assessment            Exercise frequency: 5-6 times/week.  Exercise type: Walking, Weight lifting.      Sexual Assessment            Sexually active: Yes.  Sexual orientation: Heterosexual.        Physical Examination   Vital Signs   2019 9:55 AM CST Temperature Tympanic 98.9 DegF    Peripheral Pulse Rate 59 bpm  LOW    HR Method Electronic    Systolic Blood Pressure 122 mmHg    Diastolic Blood Pressure 72 mmHg    Mean Arterial Pressure 89 mmHg    BP Method Electronic      Measurements from flowsheet : Measurements   2019 9:55 AM CST Height Measured - Standard 73 in    Weight Measured - Standard 226.2 lb    BSA 2.3 m2    Body Mass Index 29.84 kg/m2  HI      General:  Alert and oriented, No acute distress.    Neck:  Supple.    Respiratory:  Lungs are clear to auscultation, Respirations are non-labored.    Cardiovascular:  Normal rate, Regular rhythm.    Integumentary:  incision dressing looks good.    Neurologic:  Alert, Oriented.       Impression and Plan   Diagnosis     Osteoarthritis of right hip (EXB60-AX  M16.11).     Course:  Progressing as expected.    Plan:  per ortho  med rec and dc summary reviewed.    Patient Instructions:       Counseled: Patient, Spouse, Regarding diagnosis, Regarding treatment, Regarding medications, Diet, Activity.

## 2022-02-16 NOTE — PROGRESS NOTES
Patient:   TAMAR KENNEDY            MRN: [id number]            FIN: [id number]               Age:   64 years     Sex:  Male     :  1955   Associated Diagnoses:   Osteoarthritis of right hip; History of non-ST elevation myocardial infarction (NSTEMI); Elevated lipids; Coronary artery disease; Asthma   Author:   Quinn Al MD      Visit Information      Date of Service: 2020 08:54 am  Performing Location: [facility]  Encounter#: [id number]      Primary Care Provider (PCP):  Quinn Al MD    NPWOJCIECH# [id number]      Referring Provider:  Quinn Al MD# [id number]      Chief Complaint   3/11/2020 8:57 AM CDT    IVD check up.      History of Present Illness   Patient is here today for annual follow-up.  Overall he is been doing well still struggling with hip pain even though he had the replacement last fall.  He is in physical therapy seems to be improving.  He is using his albuterol on a daily basis just once in the morning.  Notes it seems to make him do better throughout the whole day.  Cats are a big problem.  He is taking his atorvastatin as directed he is done with his Plavix at the end of this month.  He is on his baby aspirin every day.  He is due for colonoscopy         Review of Systems   Constitutional:  Negative.    Eye:  Negative.    Ear/Nose/Mouth/Throat:  Negative.    Respiratory:  Negative.    Cardiovascular:  Negative.    Gastrointestinal:  Negative.    Genitourinary:  Negative.    Hematology/Lymphatics:  Negative.    Endocrine:  Negative.    Immunologic:  Negative.    Musculoskeletal:  Negative except as documented in history of present illness.    Integumentary:  Negative.    Neurologic:  Negative.       Health Status   Allergies:    Allergic Reactions (Selected)  No Known Medication Allergies   Medications:  (Selected)   Prescriptions  Prescribed  Flovent  mcg/inh inhalation aerosol: ( 220 mcg ), Inhale, bid, # 1 EA, 11 Refill(s), Type:  Maintenance, Pharmacy: [facility] #[id number], 220 mcg Inhale bid  Ventolin HFA 90 mcg/inh inhalation aerosol: 2 puff(s), Inhale, q4-6 hrs, # 3 EA, 3 Refill(s), Type: Soft Stop, Pharmacy: [facility] #[id number], 2 puff(s) Inhale q4-6 hrs  atorvastatin 80 mg oral tablet: = 1 tab(s) ( 80 mg ), Oral, daily, # 90 tab(s), 3 Refill(s), Type: Maintenance, Pharmacy: [facility] #[id number], 1 tab(s) Oral daily  lisinopril 10 mg oral tablet: = 1 tab(s) ( 10 mg ), Oral, daily, # 90 tab(s), 3 Refill(s), Type: Maintenance, Pharmacy: [facility] #[id number], 1 tab(s) Oral daily  Documented Medications  Documented  aspirin 81 mg oral tablet, chewable: = 1 tab(s) ( 81 mg ), PO, Daily, # 30 tab(s), 0 Refill(s), Type: Maintenance  clopidogrel 75 mg oral tablet: = 1 tab(s) ( 75 mg ), Oral, daily, Instructions: Rx'd by JOSEE HOLT, 0 Refill(s), Type: Maintenance,    Medications          *denotes recorded medication          Ventolin HFA 90 mcg/inh inhalation aerosol: 2 puff(s), Inhale, q4-6 hrs, 3 EA, 3 Refill(s).          *aspirin 81 mg oral tablet, chewable: 81 mg, 1 tab(s), PO, Daily, 30 tab(s), 0 Refill(s).          atorvastatin 80 mg oral tablet: 80 mg, 1 tab(s), Oral, daily, 90 tab(s), 3 Refill(s).          *clopidogrel 75 mg oral tablet: 75 mg, 1 tab(s), Oral, daily, Rx'd by JOSEE HOLT, 0 Refill(s).          Flovent  mcg/inh inhalation aerosol: 220 mcg, Inhale, bid, 1 EA, 11 Refill(s).          lisinopril 10 mg oral tablet: 10 mg, 1 tab(s), Oral, daily, 90 tab(s), 3 Refill(s).       Problem list:    All Problems (Selected)  Allergic rhinitis / SNOMED CT 389938161 / Confirmed  Asthma / SNOMED CT [id number]-[id number]-2XQ4-[id number]-[id number] / Confirmed  Coronary artery disease / SNOMED CT 68423787 / Confirmed  Family history of heart disease in male family member before age 55 / SNOMED CT 082342782 / Confirmed  History of non-ST elevation myocardial infarction (NSTEMI) / SNOMED CT 2699839466 /  Confirmed  Elevated lipids / SNOMED CT 972937847 / Confirmed  Obesity / SNOMED CT 9726358910 / Probable  Osteoarthritis of right hip / SNOMED CT 7296775313 / Confirmed      Histories   Past Medical History:    Active  History of non-ST elevation myocardial infarction (NSTEMI) ([id number]): Onset on 3/15/2019 at 63 years.  Resolved  Inpatient stay ([id number]): Onset on 3/15/2019 at 63 years.  Resolved on 3/18/2019 at 63 years.  Comments:  3/27/2019 CDT 7:54 AM CDT - Shirlene Rodrigues  @[place], [place] - NSTEMI   Family History:    MI - Myocardial infarction  Father (Unknown, )     Procedure history:    Arthroscopy of hip (SNOMED CT 884221629) performed by Christopher Abraham MD on 2019 at 64 Years.  Comments:  2019 1:32 PM CST - Janel Momin  Total right.  Endovascular insertion of drug eluting stent (SNOMED CT 7560212326) on 3/18/2019 at 63 Years.  Comments:  3/27/2019 7:58 AM CDT - Shirlene Rodrigues  x1 to LAD   Social History:        Alcohol Assessment            Current, Beer (12 oz), Wine (5 oz), 3-5 times per week, 1 drinks/episode average.  2 drinks/episode maximum.      Tobacco Assessment            Past      Substance Abuse Assessment            Past      Employment and Education Assessment            Employed, Work/School description: .      Home and Environment Assessment            Spouse/Partner name: Cielo Polk.      Nutrition and Health Assessment            Type of diet: Regular.      Exercise and Physical Activity Assessment            Exercise frequency: 5-6 times/week.  Exercise type: Walking, Weight lifting.      Sexual Assessment            Sexually active: Yes.  Sexual orientation: Heterosexual.        Physical Examination   Vital Signs   3/11/2020 8:57 AM CDT Temperature Tympanic 98.2 DegF    Peripheral Pulse Rate 81 bpm    HR Method Electronic    Systolic Blood Pressure 135 mmHg  HI    Diastolic Blood Pressure 76 mmHg    Mean Arterial Pressure 96 mmHg     BP Method Electronic      Measurements from flowsheet : Measurements   3/11/2020 8:57 AM CDT Height Measured - Standard 73 in    Weight Measured - Standard 225.6 lb    BSA 2.29 m2    Body Mass Index 29.76 kg/m2  HI      General:  Alert and oriented, No acute distress.    HENT:  No pharyngeal erythema.    Neck:  Supple, Non-tender, No carotid bruit, No jugular venous distention, No lymphadenopathy, No thyromegaly.    Respiratory:  Lungs are clear to auscultation, Respirations are non-labored.    Cardiovascular:  Normal rate, Regular rhythm, No murmur, No edema.    Gastrointestinal:  Soft, Non-tender, No organomegaly.    Musculoskeletal:  degenerative changes noted.    Integumentary:  No rash.    Neurologic:  Alert, Oriented.       Impression and Plan   Diagnosis     Osteoarthritis of right hip (OUT92-MV M16.11).     History of non-ST elevation myocardial infarction (NSTEMI) (OYL66-WB I25.2).     Elevated lipids (SXF76-RP E78.5).     Coronary artery disease (TPV39-ZV I25.10).     Asthma (KZL64-GK J45.909).     Course:  Progressing as expected.    Patient Instructions:       Counseled: Patient, Regarding diagnosis, Regarding treatment, Regarding medications, Activity.    With his daily use of albuterol we will add Flovent instead discussed use of Flovent and decreasing his need for daily albuterol.  Continue work with Ortho and physical therapy for his arthritic hip.  Continues see cardiology for his atherosclerotic heart disease and his lipids.  He is okay for colonoscopy.  He will be done with his Plavix and will hold aspirin for a week before his colonoscopy.

## 2022-02-16 NOTE — NURSING NOTE
Comprehensive Intake Entered On:  2019 11:48 AM CST    Performed On:  2019 11:46 AM CST by Salma Ramos               Summary   Chief Complaint :   Pt here today for pre op. R hip replacement on 2019 with Dr. Abraham @ [facility]    Weight Measured :   216.8 lb(Converted to: 216 lb 13 oz, 98.34 kg)    Height Measured :   73 in(Converted to: 6 ft 1 in, 185.42 cm)    Body Mass Index :   28.6 kg/m2 (HI)    Body Surface Area :   2.25 m2   Systolic Blood Pressure :   115 mmHg   Diastolic Blood Pressure :   72 mmHg   Mean Arterial Pressure :   86 mmHg   Peripheral Pulse Rate :   65 bpm   BP Site :   Right arm   BP Method :   Manual   HR Method :   Electronic   Salma Ramos - 2019 11:46 AM CST   Health Status   Allergies Verified? :   Yes   Medication History Verified? :   Yes   Medical History Verified? :   Yes   Pre-Visit Planning Status :   Completed   Salma Ramos - 2019 11:46 AM CST   Demographics   Last Name :   BRENT   Address :    [address]   First Name :   TAMAR Sandoval Initial :   OLGA   Responsible Party Date of Birth () :   1955 CDT   City :   [place]   State :   [place]   Zip Code :   [address]   Salma Ramos - 2019 11:46 AM CST   Consents   Consent for Immunization Exchange :   Consent Granted   Consent for Immunizations to Providers :   Consent Granted   Salma Ramos - 2019 11:46 AM CST   Meds / Allergies   (As Of: 2019 11:48:40 AM CST)   Allergies (Active)   No Known Medication Allergies  Estimated Onset Date:   Unspecified ; Created By:   Demi Lujan CMA; Reaction Status:   Active ; Category:   Drug ; Substance:   No Known Medication Allergies ; Type:   Allergy ; Updated By:   Demi Lujan CMA; Reviewed Date:   2019 11:48 AM CST        Medication List   (As Of: 2019 11:48:40 AM CST)   Prescription/Discharge Order    albuterol  :   albuterol ; Status:   Prescribed ; Ordered  As Mnemonic:   Ventolin HFA 90 mcg/inh inhalation aerosol ; Simple Display Line:   2 puff(s), Inhale, q4-6 hrs, 3 EA, 3 Refill(s) ; Ordering Provider:   Quinn Al MD; Catalog Code:   albuterol ; Order Dt/Tm:   8/1/2019 8:30:54 AM CDT            Home Meds    atorvastatin  :   atorvastatin ; Status:   Documented ; Ordered As Mnemonic:   atorvastatin 80 mg oral tablet ; Simple Display Line:   0 Refill(s) ; Catalog Code:   atorvastatin ; Order Dt/Tm:   9/3/2019 11:19:36 AM CDT ; Comment:   Responsible Provider: JOSEE HOLT          lisinopril  :   lisinopril ; Status:   Documented ; Ordered As Mnemonic:   lisinopril 10 mg oral tablet ; Simple Display Line:   10 mg, 1 tab(s), Oral, daily, 0 Refill(s) ; Catalog Code:   lisinopril ; Order Dt/Tm:   3/19/2019 1:57:49 PM CDT          ticagrelor  :   ticagrelor ; Status:   Documented ; Ordered As Mnemonic:   ticagrelor 90 mg oral tablet ; Simple Display Line:   90 mg, 1 tab(s), Oral, bid, 0 Refill(s) ; Catalog Code:   ticagrelor ; Order Dt/Tm:   3/19/2019 1:58:36 PM CDT          aspirin  :   aspirin ; Status:   Documented ; Ordered As Mnemonic:   aspirin 81 mg oral tablet, chewable ; Simple Display Line:   81 mg, 1 tab(s), PO, Daily, 30 tab(s), 0 Refill(s) ; Catalog Code:   aspirin ; Order Dt/Tm:   3/19/2019 1:54:21 PM CDT

## 2022-02-16 NOTE — PROGRESS NOTES
Patient:   TAMAR KENNEDY            MRN: [id number]            FIN: [id number]               Age:   64 years     Sex:  Male     :  1955   Associated Diagnoses:   Chronic right hip pain   Author:   Quinn Al MD      Visit Information      Date of Service: 2019 08:15 am  Performing Location: [facility]  Encounter#: [id number]      Primary Care Provider (PCP):  Seth Mckeon MD    NPI# [id number]      Referring Provider:  Quinn Al MD    NPI# [id number]      Chief Complaint   2019 8:14 AM CDT     f/u R hip MRI.        History of Present Illness   chief complaint and symptoms as noted above confirmed with patient   Hip pain better      Review of Systems   Constitutional:  Negative except as documented in history of present illness.    Musculoskeletal:  Negative except as documented in history of present illness.    Integumentary:  Negative.    Neurologic:  Negative.       Health Status   Allergies:    Allergic Reactions (Selected)  No known allergies   Medications:  (Selected)   Prescriptions  Prescribed  Ventolin HFA 90 mcg/inh inhalation aerosol: 2 puff(s), Inhale, q4-6 hrs, # 3 EA, 3 Refill(s), Type: Soft Stop, Pharmacy: [facility] #[id number], 2 puff(s) Inhale q4-6 hrs  Documented Medications  Documented  aspirin 81 mg oral tablet, chewable: = 1 tab(s) ( 81 mg ), PO, Daily, # 30 tab(s), 0 Refill(s), Type: Maintenance  atorvastatin 40 mg oral tablet: = 2 tab(s) ( 80 mg ), Oral, daily, 0 Refill(s), Type: Maintenance  lisinopril 10 mg oral tablet: = 1 tab(s) ( 10 mg ), Oral, daily, 0 Refill(s), Type: Maintenance  ticagrelor 90 mg oral tablet: = 1 tab(s) ( 90 mg ), Oral, bid, 0 Refill(s), Type: Maintenance,    Medications          *denotes recorded medication          Ventolin HFA 90 mcg/inh inhalation aerosol: 2 puff(s), Inhale, q4-6 hrs, 3 EA, 3 Refill(s).          *aspirin 81 mg oral tablet, chewable: 81 mg, 1 tab(s), PO, Daily, 30 tab(s), 0 Refill(s).           *atorvastatin 40 mg oral tablet: 80 mg, 2 tab(s), Oral, daily, 0 Refill(s).          *lisinopril 10 mg oral tablet: 10 mg, 1 tab(s), Oral, daily, 0 Refill(s).          *ticagrelor 90 mg oral tablet: 90 mg, 1 tab(s), Oral, bid, 0 Refill(s).     Problem list:    All Problems  Asthma / SNOMED CT [id number]-[id number] / Confirmed  Family history of heart disease in male family member before age 55 / SNOMED CT 040904239 / Confirmed  History of non-ST elevation myocardial infarction (NSTEMI) / SNOMED CT 5392671970 / Confirmed  Elevated lipids / SNOMED CT 722792968 / Confirmed  Obesity / SNOMED CT 0728374984 / Probable  Resolved: Inpatient stay / SNOMED CT 810857357  Canceled: Asthma / SNOMED CT [id number]-A40WS069H2A4      Histories   Past Medical History:    Active  History of non-ST elevation myocardial infarction (NSTEMI) (3458044077): Onset on 3/15/2019 at 63 years.  Resolved  Inpatient stay ([id number]): Onset on 3/15/2019 at 63 years.  Resolved on 3/18/2019 at 63 years.  Comments:  3/27/2019 CDT 7:54 AM Shirlene Darling  @[place], [place] - NSTEMI   Family History:    MI - Myocardial infarction  Father (Unknown, )     Procedure history:    Endovascular insertion of drug eluting stent (SNOMED CT 8537075912) on 3/18/2019 at 63 Years.  Comments:  3/27/2019 7:58 AM Shirlene Darling  x1 to LAD   Social History:        Alcohol Assessment            Current, Beer (12 oz), Wine (5 oz), 3-5 times per week, 1 drinks/episode average.  2 drinks/episode maximum.      Tobacco Assessment            Past      Substance Abuse Assessment            Past      Employment and Education Assessment            Employed, Work/School description: .      Home and Environment Assessment            Spouse/Partner name: Cielo Polk.      Nutrition and Health Assessment            Type of diet: Regular.      Exercise and Physical Activity Assessment            Exercise frequency:  5-6 times/week.  Exercise type: Walking, Weight lifting.      Sexual Assessment            Sexually active: Yes.  Sexual orientation: Heterosexual.      Physical Examination   Vital Signs   8/1/2019 8:14 AM CDT Peripheral Pulse Rate 52 bpm  LOW    Pulse Site Radial artery    HR Method Electronic    Systolic Blood Pressure 122 mmHg    Diastolic Blood Pressure 78 mmHg    Mean Arterial Pressure 93 mmHg    BP Site Right arm    BP Method Electronic      General:  Alert and oriented, No acute distress.    Neurologic:  Alert, Oriented.       Impression and Plan   Diagnosis     Chronic right hip pain (VYN46-OF M25.551).     Course:  Not progressing as expected.    Plan:  reviewed mri  will wait on ortho  needs colonoscopy in 2 months.    Patient Instructions:       Counseled: Patient, Regarding diagnosis, Regarding treatment, Regarding medications, Activity.

## 2022-02-16 NOTE — PROGRESS NOTES
Patient:   TAMAR KENNEDY            MRN: [id number]            FIN: [id number]               Age:   65 years     Sex:  Male     :  1955   Associated Diagnoses:   HTN (hypertension); Cerumen impaction   Author:   Quinn Al MD      Visit Information      Date of Service: 2021 10:20 am  Performing Location: [facility]  Encounter#: [id number]      Primary Care Provider (PCP):  Quinn Al MD    NPI# [id number]      Referring Provider:  Quinn Al MD    NPI# [id number]      Chief Complaint   2021 10:43 AM CDT    Right ear pain x1-2 weeks.  f/up HTN        History of Present Illness   Patient is here for some right ear discomfort.  Notes has been giving him some issues on and off her for couple weeks.  No cold symptoms no nasal discharge.  He also notes he needs follow-up on his hypertension he had his atorvastatin increased needs his labs repeated.  Tolerating it well.         Review of Systems   Constitutional:  Negative except as documented in history of present illness.    Ear/Nose/Mouth/Throat:  Negative except as documented in history of present illness.    Respiratory:  Negative.    Cardiovascular:  Negative.    Integumentary:  Negative.    Neurologic:  Negative.       Health Status   Allergies:    Allergic Reactions (Selected)  No Known Medication Allergies   Medications:  (Selected)   Prescriptions  Prescribed  Advair Diskus 250 mcg-50 mcg inhalation powder: 1 puff(s), Inhale, bid, # 180 EA, 1 Refill(s), Type: Maintenance, Pharmacy: [facility] #[id number], 1 puff(s) Inhale bid, 73, in, 21 10:43:00 CDT, Height Measured, 243, lb, 21 10:43:00 CDT, Weight Measured  Ventolin HFA 90 mcg/inh inhalation aerosol: 2 puff(s), Inhale, q4-6 hrs, # 3 EA, 1 Refill(s), Type: Soft Stop, Pharmacy: [facility] #[id number], 2 puff(s) Inhale q4-6 hrs, 73, in, 21 10:43:00 CDT, Height Measured, 243, lb, 21 10:43:00 CDT, Weight  Measured  amLODIPine 5 mg oral tablet: = 1 tab(s) ( 5 mg ), Oral, daily, # 90 tab(s), 1 Refill(s), Type: Maintenance, Pharmacy: [facility] #[id number], 1 tab(s) Oral daily, 73, in, 04/02/21 10:43:00 CDT, Height Measured, 243, lb, 04/02/21 10:43:00 CDT, Weight Measured  atorvastatin 80 mg oral tablet: = 1 tab(s) ( 80 mg ), Oral, daily, # 90 tab(s), 1 Refill(s), Type: Maintenance, Pharmacy: [facility] #[id number], 1 tab(s) Oral daily, 73, in, 04/02/21 10:43:00 CDT, Height Measured, 243, lb, 04/02/21 10:43:00 CDT, Weight Measured  losartan 50 mg oral tablet: = 1 tab(s) ( 50 mg ), Oral, daily, # 90 tab(s), 1 Refill(s), Type: Maintenance, Pharmacy: [facility] #[id number], 1 tab(s) Oral daily, 73, in, 04/02/21 10:43:00 CDT, Height Measured, 243, lb, 04/02/21 10:43:00 CDT, Weight Measured  Documented Medications  Documented  aspirin 81 mg oral tablet, chewable: = 1 tab(s) ( 81 mg ), PO, Daily, # 30 tab(s), 0 Refill(s), Type: Maintenance   Problem list:    All Problems (Selected)  Allergic rhinitis / SNOMED CT 004160709 / Confirmed  Asthma / SNOMED CT [id number] / Confirmed  Coronary artery disease / SNOMED CT 27047628 / Confirmed  Family history of heart disease in male family member before age 55 / SNOMED CT 681395028 / Confirmed  History of non-ST elevation myocardial infarction (NSTEMI) / SNOMED CT 9263151377 / Confirmed  HTN (hypertension) / SNOMED CT 9000448114 / Confirmed  Elevated lipids / SNOMED CT 628200573 / Confirmed  Obesity / SNOMED CT 5855375963 / Probable  Osteoarthritis of right hip / SNOMED CT 0421478489 / Confirmed      Histories   Past Medical History:    Active  History of non-ST elevation myocardial infarction (NSTEMI) (6938038918): Onset on 3/15/2019 at 63 years.  Resolved  Inpatient stay (334202562): Onset on 3/15/2019 at 63 years.  Resolved on 3/18/2019 at 63 years.  Comments:  3/27/2019 CDT 7:54 AM CDT - Shirlene Rodrigues  @[place], [place] - NSTEMI    Family History:    MI - Myocardial infarction  Father (Unknown, )     Procedure history:    Arthroscopy of hip (SNOMED CT 824426799) performed by Christopher Abraham MD on 2019 at 64 Years.  Comments:  2019 1:32 PM CST - Merrill Janel  Total right.  Endovascular insertion of drug eluting stent (SNOMED CT 0531602601) on 3/18/2019 at 63 Years.  Comments:  3/27/2019 7:58 AM CDT - Shirlene Rodrigues  x1 to LAD   Social History:        Electronic Cigarette/Vaping Assessment            Electronic Cigarette Use: Never.      Alcohol Assessment            Current, Beer (12 oz), Wine (5 oz), 3-5 times per week, 1 drinks/episode average.  2 drinks/episode maximum.      Tobacco Assessment            Former smoker, quit more than 30 days ago, Cigars      Substance Abuse Assessment            Past      Employment and Education Assessment            Employed, Work/School description: .      Home and Environment Assessment            Spouse/Partner name: Cielo Polk.      Nutrition and Health Assessment            Type of diet: Regular.      Exercise and Physical Activity Assessment            Exercise frequency: 5-6 times/week.  Exercise type: Walking, Weight lifting.      Sexual Assessment            Sexually active: Yes.  Sexual orientation: Heterosexual.        Physical Examination   Measurements from flowsheet : Measurements   2021 10:43 AM CDT Height Measured - Standard 73 in    Weight Measured - Standard 243.0 lb    BSA 2.38 m2    Body Mass Index 32.06 kg/m2  HI      General:  Alert and oriented, No acute distress.    HENT:       Ear: Both ears, Canal ( Impacted with cerumen, canal curreted and irrigated until clear ).    Neck:  Supple, Non-tender.    Respiratory:  Respirations are non-labored.    Cardiovascular:  Normal rate, Regular rhythm.    Neurologic:  Alert, Oriented.       Impression and Plan   Diagnosis     HTN (hypertension) (JNA99-NU I10).     Cerumen impaction (JCO89-FW H61.20).      Plan:  We will check his labs today.  We will add amlodipine to his losartan.  Continue to track his blood pressures goal is 120s over 70s.  Given his cardiac history we need to be more aggressive with blood pressure control.  He is also due for lipids today.  .

## 2022-02-16 NOTE — CARE COORDINATION
"   Patient:   ROB KENNEDY            MRN: [id number]                           Age:   63 years     Sex:  Male     :  1955   Associated Diagnoses:   None   Author:   Lavern Wise CMA      Care Coordination Hospital Discharge Note    Sources of Information:  [   ] Patient, family member, or caregiver (Please list):  Called and spoke with Rob.  He is going to f/u with [facility] clinic.  He had no questions or concerns about his discharge instructions, f/u appointments or medications.  At the end he asked if he could ask a question, \"Our house was set at 55 when I got home last night and I just shook and tried to get warm. I am fine today and haven't had it happen again, no chest pain or hot/cold sweats.\"  CC advised he check his temp and if it continues or comes back he be seen.  He verbalized understanding and agreed.  [ X  ] Hospital discharge summary  [   ] Hospital fax  [   ] List of recent hospitalizations or ED visits  [   ] Other:   Last Attending:    Aishwarya Rodrigues DO  Discharged From: [facility]   Admission Date: 03/15/19  Discharge Date: 19  Discharge Plan: home  Diagnosis/Problem: NSTEMI    Medication Changes: [  X ] Yes [   ] No   Medication List Updated: [  X ] Yes [   ] No Hospital Med List at Discharge Reconciled with Current Med List   Medications          *denotes recorded medication          Ventolin HFA 90 mcg/inh inhalation aerosol: See Instructions, INHALE 2 PUFFS BY MOUTH EVERY 4 TO 6 HOURS, 1 EA, 11 Refill(s).          *aspirin 81 mg oral tablet, chewable: 81 mg, 1 tab(s), PO, Daily, 30 tab(s), 0 Refill(s).          *atorvastatin 40 mg oral tablet: 40 mg, 1 tab(s), Oral, daily, 0 Refill(s).          *lisinopril 10 mg oral tablet: 10 mg, 1 tab(s), Oral, daily, 0 Refill(s).          *ticagrelor 90 mg oral tablet: 90 mg, 1 tab(s), Oral, bid, 0 Refill(s).      Needs Referral or Lab: [ X  ] Yes [   ] No: Outpatient Cardiac Rehab Eval and Treat  Outpatient Provider Recommendations: " blood pressure, cardiac risk management modification, routine health maintenance.  Needs Follow-up Appointment:  [ X  ] Within 7 days of discharge (highly complex visit)  [   ] Within 14 days of discharge (moderately complex visit)    Appointment Made With:  Date:    Additional Information Needed and Requested:  [   ] Yes:  [ X  ] No

## 2022-02-16 NOTE — LETTER
(Inserted Image. Unable to display)     June 07, 2019      TAMAR KENNEDY   [place], [place] [id number]          Dear TAMAR,      Thank you for selecting [facility] (previously [facility]) for your healthcare needs.      Our records indicate you are due for the following services:     Asthma Follow-up Office Visit ~ Please bring in your inhalers/asthma medications that you are currently using to your visit.       To schedule an appointment or if you have further questions, please contact your primary clinic:   [facility]       [phone]   [facility]       [phone]              [facility]     [phone]      Powered by [employer]    Sincerely,    Seth Mckeon MD

## 2022-02-16 NOTE — LETTER
(Inserted Image. Unable to display)   March 16, 2019      TAMAR KENNEDY       [place], [place] [id number]        Dear TAMAR,    Thank you for selecting [facility] for your healthcare needs.  Below you will find the results of the recent tests done at our clinic.     We will discuss this at your next visit.      Result Name Current Result Previous Result Reference Range   Cholesterol (mg/dL) ((H)) 318 3/15/2019 ((H)) 263 3/23/2017  - <200   HDL (mg/dL)  67 3/15/2019  41 3/23/2017 >40 -    LDL ((H)) 228 3/15/2019 ((H)) 212 3/23/2017    Triglyceride (mg/dL)  99 3/15/2019  52 3/23/2017  - <150       Please contact me or my assistant at [phone] if you have any questions.     Sincerely,        Quinn Al MD        What do your labs mean?  Below is a glossary of commonly ordered labs:  LDL   Bad Cholesterol   HDL   Good Cholesterol  AST/ALT   Liver Function   Cr/Creatinine   Kidney Function  Microalbumin   Kidney Function  BUN   Kidney Function  PSA   Prostate    TSH   Thyroid Hormone  HgbA1c   Diabetes Test   Hgb (Hemoglobin)   Red Blood Cells

## 2022-02-16 NOTE — PROGRESS NOTES
Patient:   TAMAR KENNEDY            MRN: [id number]            FIN: [id number]               Age:   65 years     Sex:  Male     :  1955   Associated Diagnoses:   Gout of right foot   Author:   Fito Esquivel PA-C      Visit Information   Visit type:  General concerns.    Accompanied by:  No one.    Source of history:  Self, Medical record.    Referral source:  Self.    History limitation:  None.       Chief Complaint   2021 7:56 AM CDT    Right foot painful, hard to sleep at night x few days        Interval History   Gout   Prn use of analgesics increased.  The course is worsening.  Compliance problems: not with diet, not with exercise program, not with medications and not with weight management.  The effect on daily activities is change in ambulation, change in sleeping patterns and change in work/school duties.  Associated symptoms characterized by no fever, decreased range of motion, joint redness, joint stiffness or joint warmth.  Flare of gout right foot. Rare flares. No recent uric acid level in chart. No fever or chills. No injury. Walking with crutches. CC above noted and confirmed with the patient..        Review of Systems   Constitutional:  Negative.    Musculoskeletal:  Negative except as documented in history of present illness.       Health Status   Allergies:    Allergic Reactions (Selected)  No Known Medication Allergies   Medications:  (Selected)   Prescriptions  Prescribed  Advair Diskus 250 mcg-50 mcg inhalation powder: 1 puff(s), Inhale, bid, # 180 EA, 1 Refill(s), Type: Maintenance, Pharmacy: [facility] #[id number], 1 puff(s) Inhale bid, 73, in, 21 10:43:00 CDT, Height Measured, 243, lb, 21 10:43:00 CDT, Weight Measured  Ventolin HFA 90 mcg/inh inhalation aerosol: 2 puff(s), Inhale, q4-6 hrs, # 3 EA, 1 Refill(s), Type: Soft Stop, Pharmacy: [facility] #[id number], 2 puff(s) Inhale q4-6 hrs, 73, in, 21 10:43:00 CDT, Height Measured, 243, lb, 21 10:43:00  CDT, Weight Measured  amLODIPine 5 mg oral tablet: = 1 tab(s) ( 5 mg ), Oral, daily, # 90 tab(s), 1 Refill(s), Type: Maintenance, Pharmacy: [facility] #[id number], 1 tab(s) Oral daily, 73, in, 04/02/21 10:43:00 CDT, Height Measured, 243, lb, 04/02/21 10:43:00 CDT, Weight Measured  atorvastatin 80 mg oral tablet: = 1 tab(s) ( 80 mg ), Oral, daily, # 90 tab(s), 1 Refill(s), Type: Maintenance, Pharmacy: [facility] #[id number], 1 tab(s) Oral daily, 73, in, 04/02/21 10:43:00 CDT, Height Measured, 243, lb, 04/02/21 10:43:00 CDT, Weight Measured  indomethacin 50 mg oral capsule: = 1 cap(s) ( 50 mg ), Oral, tid, PRN: for gout pain, # 30 cap(s), 0 Refill(s), Type: Maintenance, Pharmacy: [facility] #[id number], 1 cap(s) Oral tid,PRN:for gout pain, 73, in, 05/21/21 7:56:00 CDT, Height Measured, 237.3, lb, 05/21/21 7:56:00 CD...  losartan 50 mg oral tablet: = 1 tab(s) ( 50 mg ), Oral, daily, # 90 tab(s), 1 Refill(s), Type: Maintenance, Pharmacy: [facility] #[id number], 1 tab(s) Oral daily, 73, in, 04/02/21 10:43:00 CDT, Height Measured, 243, lb, 04/02/21 10:43:00 CDT, Weight Measured  Documented Medications  Documented  acetaminophen 325 mg oral tablet: = 2 tab(s) ( 650 mg ), Oral, q4 hrs, 0 Refill(s), Type: Maintenance  aspirin 81 mg oral tablet, chewable: = 1 tab(s) ( 81 mg ), PO, Daily, # 30 tab(s), 0 Refill(s), Type: Maintenance  ibuprofen 200 mg oral tablet: = 2 tab(s) ( 400 mg ), Oral, q4 hrs, PRN: for pain, # 120 tab(s), 0 Refill(s), Type: Maintenance  naproxen sodium 220 mg oral tablet: = 1 tab(s) ( 220 mg ), Oral, q8 hrs, PRN: for pain, # 30 tab(s), 0 Refill(s), Type: Maintenance  nitroglycerin 0.4 mg sublingual tablet: 0 Refill(s), Type: Soft Stop   Problem list:    All Problems (Selected)  Osteoarthritis of right hip / SNOMED CT 9330705097 / Confirmed  Obesity / SNOMED CT 0021078516 / Probable  HTN (hypertension) / SNOMED CT 9871846858 / Confirmed  History of non-ST elevation myocardial infarction  (NSTEMI) / SNOMED CT 7197578520 / Confirmed  Family history of heart disease in male family member before age 55 / SNOMED CT 672512260 / Confirmed  Elevated lipids / SNOMED CT 650731240 / Confirmed  Coronary artery disease / SNOMED CT 73629450 / Confirmed  Asthma / SNOMED CT [id number] / Confirmed  Allergic rhinitis / SNOMED CT 139106680 / Confirmed      Histories   Past Medical History:    Active  History of non-ST elevation myocardial infarction (NSTEMI) (8918613302): Onset on 3/15/2019 at 63 years.  Resolved  Inpatient stay (473251170): Onset on 3/15/2019 at 63 years.  Resolved on 3/18/2019 at 63 years.  Comments:  3/27/2019 CDT 7:54 AM [facility] - Shirlene Rodrigues  @[place], [place] - NSTEMI   Family History:    MI - Myocardial infarction  Father (Unknown, )     Procedure history:    Arthroscopy of hip (717122194) on 2019 at 64 Years.  Comments:  2019 1:32 PM CST - Jnael Momin  Total right.  Endovascular insertion of drug eluting stent ([id number]) on 3/18/2019 at 63 Years.  Comments:  3/27/2019 7:58 AM CDT - Shirlene Rodrigues  x1 to LAD      Physical Examination   Vital Signs   2021 7:56 AM CDT Peripheral Pulse Rate 66 bpm    HR Method Electronic    Systolic Blood Pressure 160 mmHg  HI    Diastolic Blood Pressure 93 mmHg  HI    Mean Arterial Pressure 115 mmHg    BP Site Right arm    BP Method Electronic      Measurements from flowsheet : Measurements   2021 7:56 AM CDT Height Measured - Standard 73 in    Weight Measured - Standard 237.3 lb    BSA 2.35 m2    Body Mass Index 31.3 kg/m2  HI      Cardiovascular:          Arterial pulses: Right, Posterior tibial, Dorsalis pedis, 2+.         Capillary refill: Right, Lower extremity, Within normal limits.    Musculoskeletal:  Painful medial right foot. Skin intact. Painful ROM great toe.       Impression and Plan   Diagnosis     Gout of right foot (BRP99-YE M10.9).     Patient Instructions:       Counseled:  Patient, Regarding diagnosis, Verbalized understanding.    Summary:  FU PRN as we discussed..    Orders     Orders (Selected)   Prescriptions  Prescribed  indomethacin 50 mg oral capsule: = 1 cap(s) ( 50 mg ), Oral, tid, PRN: for gout pain, # 30 cap(s), 0 Refill(s), Type: Maintenance, Pharmacy: [facility] #[id number], 1 cap(s) Oral tid,PRN:for gout pain, 73, in, 05/21/21 7:56:00 CDT, Height Measured, 237.3, lb, 05/21/21 7:56:00 CD....

## 2022-02-16 NOTE — NURSING NOTE
Asthma Control Test (ACT) Total Entered On:  3/19/2019 8:13 AM CDT    Performed On:  3/15/2019 8:13 AM CDT by Jimena Rich MA               Asthma Control Test (ACT) Total   Asthma Control Test Total (Adult) :   15    Jimena Rich MA - 3/19/2019 8:13 AM CDT

## 2022-02-16 NOTE — NURSING NOTE
Comprehensive Intake Entered On:  7/23/2019 3:54 PM CDT    Performed On:  7/23/2019 3:50 PM CDT by Luz Elena Wise CMA               Summary   Chief Complaint :   Pt c/o R groin pain on/off x 2 years. Was injured around that time. Has been seen for.    Weight Measured :   212 lb(Converted to: 212 lb 0 oz, 96.16 kg)    Systolic Blood Pressure :   116 mmHg   Diastolic Blood Pressure :   68 mmHg   Mean Arterial Pressure :   84 mmHg   Peripheral Pulse Rate :   60 bpm   BP Site :   Right arm   Pulse Site :   Radial artery   BP Method :   Manual   HR Method :   Manual   Temperature Tympanic :   97.8 DegF(Converted to: 36.6 DegC)  (LOW)    Luz Elena Wise CMA - 7/23/2019 3:50 PM CDT   Health Status   Allergies Verified? :   Yes   Medication History Verified? :   Yes   Pre-Visit Planning Status :   Completed   Luz Elena Wise CMA - 7/23/2019 3:50 PM CDT   Meds / Allergies   (As Of: 7/23/2019 3:54:45 PM CDT)   Allergies (Active)   No known allergies  Estimated Onset Date:   Unspecified ; Created By:   Pily Cooper CMA; Reaction Status:   Active ; Category:   Drug ; Substance:   No known allergies ; Type:   Allergy ; Updated By:   Pily Cooper CMA; Reviewed Date:   5/16/2019 8:42 AM CDT        Medication List   (As Of: 7/23/2019 3:54:45 PM CDT)   Prescription/Discharge Order    albuterol  :   albuterol ; Status:   Prescribed ; Ordered As Mnemonic:   Ventolin HFA 90 mcg/inh inhalation aerosol ; Simple Display Line:   See Instructions, INHALE 2 PUFFS BY MOUTH EVERY 4 TO 6 HOURS, 1 EA, 11 Refill(s) ; Ordering Provider:   Seth Mckeon MD; Catalog Code:   albuterol ; Order Dt/Tm:   6/6/2018 11:04:15 AM            Home Meds    aspirin  :   aspirin ; Status:   Documented ; Ordered As Mnemonic:   aspirin 81 mg oral tablet, chewable ; Simple Display Line:   81 mg, 1 tab(s), PO, Daily, 30 tab(s), 0 Refill(s) ; Catalog Code:   aspirin ; Order Dt/Tm:   3/19/2019 1:54:21 PM          atorvastatin  :   atorvastatin ;  Status:   Documented ; Ordered As Mnemonic:   atorvastatin 40 mg oral tablet ; Simple Display Line:   80 mg, 2 tab(s), Oral, daily, 0 Refill(s) ; Catalog Code:   atorvastatin ; Order Dt/Tm:   3/19/2019 1:56:39 PM          lisinopril  :   lisinopril ; Status:   Documented ; Ordered As Mnemonic:   lisinopril 10 mg oral tablet ; Simple Display Line:   10 mg, 1 tab(s), Oral, daily, 0 Refill(s) ; Catalog Code:   lisinopril ; Order Dt/Tm:   3/19/2019 1:57:49 PM          ticagrelor  :   ticagrelor ; Status:   Documented ; Ordered As Mnemonic:   ticagrelor 90 mg oral tablet ; Simple Display Line:   90 mg, 1 tab(s), Oral, bid, 0 Refill(s) ; Catalog Code:   ticagrelor ; Order Dt/Tm:   3/19/2019 1:58:36 PM

## 2022-02-16 NOTE — CARE COORDINATION
Pt appears on  TFS chronic disease panel as out of parameters for elevated BP.  BP was rechecked 4/05/21  126/76  Lavern Wise CMA

## 2022-02-16 NOTE — TELEPHONE ENCOUNTER
Order, notes and insurance card are faxed to [facility] and they will contact patient.  Patient informed.

## 2022-02-16 NOTE — TELEPHONE ENCOUNTER
---------------------  From: Iliana Doll LPN (Phone Messages Pool ([id number]_[facility]))   To: Phone Messages Pool ([id number]_[place] - [place]);     Sent: 5/10/2019 11:42:53 AM CDT  Subject: questions     Phone Message    PCP:   JENNIFER      Time of Call:  9:44am       Person Calling:  Maxine BARAJAS same day surgery   Phone number:  [phone]    Returned call at: 11:38am    Note:   Diana LM  stating she has some questions as pt is scheduled for a colonoscopy. She says pt had a recent MI and she wonders if he should even be having a colonoscopy. She also has questions about his blood thinner.    Returned call and LM for Diana to call back and leave questions on voicemail.    Last office visit and reason:  3/15/19 Routine Health Visit Male with TFS

## 2022-02-16 NOTE — PROGRESS NOTES
Patient:   TAMAR KENNEDY            MRN: [id number]            FIN: [id number]               Age:   62 years     Sex:  Male     :  1955   Associated Diagnoses:   Pulled muscle   Author:   Seth Mckeon MD      Chief Complaint   2017 10:42 AM CST  pt here for right groin pull, he did this a few weeks ago, was told by a PT that he needs to massage it he has been doing so      History of Present Illness   see chief complaint as noted above and confirmed with the patient     62 year old male here today with complaint of right sided groin pain. He is a  and he owns a wedding venue. He was doing some work ripping up a basement floor a few weeks ago and he felt a twinge in his right groin, he continues to have pain. He spoke with one of his friends who is a physical therapist and he informed him he should massage it very frquently, he has been doing this and has found the massage has helped yet he continues to have pain. He has been able to continue working yet does feel the pain. He would like to know if he is in need of a muscle relaxer or some sort of medication to help with pain. He did have a pulled muscle in his right groin in April, he was prescribed Naproxen for help with pain.       Review of Systems   Constitutional:  No fever.    Respiratory:  No shortness of breath.    Gastrointestinal:  No nausea, No vomiting, No diarrhea, No abdominal pain.    Musculoskeletal:  right groin pain .    Integumentary:  No rash.    Neurologic:  Alert and oriented X4, No headache.             Health Status   Allergies:    Allergic Reactions (Selected)  No known allergies   Medications:  (Selected)   Prescriptions  Prescribed  Ventolin HFA 90 mcg/inh inhalation aerosol: 2 puff(s), inh, q4-6 hrs, # 1 EA, 11 Refill(s), Type: Soft Stop, Pharmacy: [id number], 2 puff(s) inh q4-6 hrs  Documented Medications  Documented  Qvar 40 mcg/inh inhalation aerosol: inh, bid, 0 Refill(s), Type: Maintenance    Problem list:    All Problems  Obesity / SNOMED CT 1328127084 / Probable  Asthma / SNOMED CT [id number] / Confirmed  Canceled: Asthma / SNOMED CT [id number]      Histories   Past Medical History:    No active or resolved past medical history items have been selected or recorded.   Family History:       Procedure history:    No active procedure history items have been selected or recorded.   Social History:        Alcohol Assessment            Current, Beer (12 oz), Wine (5 oz), 3-5 times per week, 1 drinks/episode average.  2 drinks/episode maximum.      Tobacco Assessment            Past      Substance Abuse Assessment            Past      Employment and Education Assessment            Employed, Work/School description: .      Home and Environment Assessment            Spouse/Partner name: Cielo Polk.      Nutrition and Health Assessment            Type of diet: Regular.      Exercise and Physical Activity Assessment            Exercise frequency: 5-6 times/week.  Exercise type: Walking, Weight lifting.      Sexual Assessment            Sexually active: Yes.  Sexual orientation: Heterosexual.        Physical Examination   Vital Signs   12/28/2017 10:42 AM CST Peripheral Pulse Rate 60 bpm    Pulse Site Radial artery    Systolic Blood Pressure 138 mmHg  HI    Diastolic Blood Pressure 88 mmHg  HI    Mean Arterial Pressure 105 mmHg    BP Site Right arm    Oxygen Saturation 95 %      Measurements from flowsheet : Measurements   12/28/2017 10:42 AM CST Height Measured - Standard 73 in    Weight Measured - Standard 232 lb    BSA 2.33 m2    Body Mass Index 30.61 kg/m2      General:  Alert and oriented, No acute distress.    Eye:  Pupils are equal, round and reactive to light, Normal conjunctiva.    HENT:  Oral mucosa is moist.    Neck:  Supple.    Respiratory:  Respirations are non-labored.    Cardiovascular:  Normal rate, Regular rhythm, No edema.     Gastrointestinal:  Non-distended.    Musculoskeletal:  Normal gait, no swelling or bruise at right groin area. .    Integumentary:  Warm, No rash.    Psychiatric:  Cooperative, Appropriate mood & affect, Normal judgment.       Review / Management   Results review      Impression and Plan       Diagnosis     Pulled muscle (MIL44-KQ T14.8XXA).     Plan:  Continue with frequent massage.     Wear compression shorts under your pants.     Can take tylenol and or ibuprofen for pain relief.     .    Summary:  Informed patient to continue with massage, try compression pants, and also that it may take 2-3 months for muscle to completely heal and return back to normal. .    Jimena JEFFREY Medical Assistant acted solely as a scribe for, and in presence of Dr. Seth Mckeon who performed the services.

## 2022-02-16 NOTE — NURSING NOTE
Comprehensive Intake Entered On:  3/5/2021 11:28 AM CST    Performed On:  3/5/2021 11:24 AM CST by Khushbu Du               Summary   Chief Complaint :   SOB, cough, mucous.  Never picked up flovent inhaler.    Weight Measured :   248.2 lb(Converted to: 248 lb 3 oz, 112.582 kg)    Height Measured :   73 in(Converted to: 6 ft 1 in, 185.42 cm)    Body Mass Index :   32.74 kg/m2 (HI)    Body Surface Area :   2.41 m2   Systolic Blood Pressure :   149 mmHg (HI)    Diastolic Blood Pressure :   80 mmHg   Mean Arterial Pressure :   103 mmHg   Peripheral Pulse Rate :   72 bpm   BP Method :   Electronic   HR Method :   Electronic   Temperature Tympanic :   98.3 DegF(Converted to: 36.8 DegC)    Oxygen Saturation :   97 %   Khushbu Du - 3/5/2021 11:24 AM CST   Health Status   Allergies Verified? :   Yes   Medication History Verified? :   Yes   Tobacco Use? :   Former smoker   Khushbu Du - 3/5/2021 11:24 AM CST   Meds / Allergies   (As Of: 3/5/2021 11:28:34 AM CST)   Allergies (Active)   No Known Medication Allergies  Estimated Onset Date:   Unspecified ; Created By:   Demi Lujan CMA; Reaction Status:   Active ; Category:   Drug ; Substance:   No Known Medication Allergies ; Type:   Allergy ; Updated By:   Demi Lujan CMA; Reviewed Date:   3/11/2020 9:50 AM CDT        Medication List   (As Of: 3/5/2021 11:28:34 AM CST)   Prescription/Discharge Order    atorvastatin  :   atorvastatin ; Status:   Prescribed ; Ordered As Mnemonic:   atorvastatin 80 mg oral tablet ; Simple Display Line:   80 mg, 1 tab(s), Oral, daily, 90 tab(s), 3 Refill(s) ; Ordering Provider:   Quinn Al MD; Catalog Code:   atorvastatin ; Order Dt/Tm:   3/11/2020 9:12:17 AM CDT          lisinopril  :   lisinopril ; Status:   Prescribed ; Ordered As Mnemonic:   lisinopril 10 mg oral tablet ; Simple Display Line:   10 mg, 1 tab(s), Oral, daily, 90 tab(s), 3 Refill(s) ; Ordering Provider:   Quinn Al MD; Catalog Code:    lisinopril ; Order Dt/Tm:   3/11/2020 9:12:18 AM CDT          albuterol  :   albuterol ; Status:   Prescribed ; Ordered As Mnemonic:   Ventolin HFA 90 mcg/inh inhalation aerosol ; Simple Display Line:   2 puff(s), Inhale, q4-6 hrs, 3 EA, 3 Refill(s) ; Ordering Provider:   Quinn Al MD; Catalog Code:   albuterol ; Order Dt/Tm:   3/11/2020 9:11:06 AM CDT            Home Meds    aspirin  :   aspirin ; Status:   Documented ; Ordered As Mnemonic:   aspirin 81 mg oral tablet, chewable ; Simple Display Line:   81 mg, 1 tab(s), PO, Daily, 30 tab(s), 0 Refill(s) ; Catalog Code:   aspirin ; Order Dt/Tm:   3/19/2019 1:54:21 PM CDT            ID Risk Screen   Recent Travel History :   No recent travel   Family Member Travel History :   No recent travel   Other Exposure to Infectious Disease :   Unknown   COVID-19 Testing Status :   No positive COVID-19 test   Khushbu Du - 3/5/2021 11:24 AM CST   Social History   Social History   (As Of: 3/5/2021 11:28:34 AM CST)   Alcohol:        Current, Beer (12 oz), Wine (5 oz), 3-5 times per week, 1 drinks/episode average.  2 drinks/episode maximum.   (Last Updated: 3/5/2015 12:34:58 PM CST by Luisa Null CMA)          Tobacco:        Former smoker, quit more than 30 days ago, Cigars   (Last Updated: 3/5/2021 11:26:49 AM CST by Khushbu Du)          Electronic Cigarette/Vaping:        Electronic Cigarette Use: Never.   (Last Updated: 3/5/2021 11:26:53 AM CST by Khushbu Du)          Substance Abuse:        Past   (Last Updated: 3/5/2015 12:35:17 PM CST by Luisa Null CMA)          Employment/School:        Employed, Work/School description: .   (Last Updated: 3/5/2015 12:35:30 PM CST by Luisa Null CMA)          Home/Environment:        Spouse/Partner name: Cielo Polk.   (Last Updated: 3/5/2015 12:36:01 PM CST by Luisa Null CMA)          Nutrition/Health:        Type of diet: Regular.   (Last Updated: 3/5/2015 12:36:10 PM CST by Chaka ORNELAS,  Luisa)          Exercise:        Exercise frequency: 5-6 times/week.  Exercise type: Walking, Weight lifting.   (Last Updated: 3/5/2015 12:36:45 PM CST by Luisa Null CMA)          Sexual:        Sexually active: Yes.  Sexual orientation: Heterosexual.   (Last Updated: 3/5/2015 12:37:02 PM CST by Luisa Null CMA)

## 2022-02-16 NOTE — TELEPHONE ENCOUNTER
---------------------  From: Frandy ORNELASLuz Elena   Sent: 4/1/2020 4:22:57 PM CDT  Subject: ACT f/u      Spoke to pt on the phone. Updated in chart.     Patients with Asthma are at a higher risk for RESPIRATORY concerns, so we are reaching out to you to see if you have a few minutes to complete your ACT  to check to see how well your Asthma is controlled. If we find any concerns, we can offer you a telephone visit. Please answer the following questions and get back to us when you are able.     1) In the past 4 weeks, how much of the time did your asthma keep you from getting as much done at work, school or at home?   1 = All of the time   2 = Most of the time   3 = Some of the time   4 = A little of the time    5 = None of the time  2)  During the past 4 weeks, how often have you had shortness of breath?   1 = More than once a day   2 = Once a day   3 = 3 to 6 times a week   4 = Once or twice a week   5 = Not at all  3) During the past 4 weeks, how often did your asthma symptoms (wheezing, coughing, shortness of breath, chest tightness or pain) wake you up at night or earlier than usual in the morning?   1 = 4 or more nights a week   2 = 2 or 3 nights a week   3 = Once a week   4 = Once or twice   5 = Not at all  4) During the past 4 weeks, how often have you used your rescue inhaler or nebulizer medication (such as albuterol)?   1 = 3 or more times per day   2 = 1 or 2 times per day   3 = 2 or 3 times per week   4 = Once a week or less   5 = Not at all  5) How would you rate your asthma control during the past 4 weeks?   1 = Not controlled at all   2 = Poorly controlled   3 = Somewhat controlled   4 = Well controlled   5 = Completely controlled    If your score is 19 or less, your asthma may not be under control. Be sure to talk with your doctor about your results. The answers below should not be added to your total score. These answers should be discussed with your doctor.    In the past 12 months, how many  emergency department visits have you had due to asthma (that did not result in a hospitalization)?  __0_   In the past 12 months, how many inpatient hospitalizations have you had due to asthma?  _0___

## 2022-02-16 NOTE — TELEPHONE ENCOUNTER
---------------------  From: Luz Elena Wise CMA   To: [facility] Message Pool ([id number] - [place]);     Sent: 7/30/2019 10:59:46 AM CDT  Subject: MRI results     Tried calling pt at [phone] but no answer and mailbox is full. Per TFS, MRI of R hip showed severe arthritis and we would like to put in referral to ortho if pt agrees.Has an appt on 8/1.

## 2022-02-16 NOTE — LETTER
(Inserted Image. Unable to display)            April 05, 2021        [place] [address]        Dear TAMAR,    Thank you for selecting [facility]  for your healthcare needs.  Below you will find the results of the recent tests done at our clinic.     All labs acceptable.      Result Name Current Result Previous Result Reference Range   Potassium Level (mmol/L)  4.4 4/2/2021  4.5 11/20/2019 3.5 - 5.3   Glucose Level (mg/dL) ((H)) 111 4/2/2021  86 11/20/2019 65 - 99   Creatinine Level (mg/dL)  0.80 4/2/2021  1.10 11/20/2019 0.70 - 1.25   Cholesterol (mg/dL)  185 4/3/2021  189 5/16/2019  - <200   HDL (mg/dL)  72 4/3/2021  50 5/16/2019 > OR = 40 -    LDL  99 4/3/2021 ((H)) 125 5/16/2019    Triglyceride (mg/dL)  46 4/3/2021  47 5/16/2019  - <150       Please contact me or my assistant at [phone] if you have any questions.     Sincerely,        Quinn Al MD        What do your labs mean?  Below is a glossary of commonly ordered labs:  LDL   Bad Cholesterol   HDL   Good Cholesterol  AST/ALT   Liver Function   Cr/Creatinine   Kidney Function  Microalbumin   Kidney Function  BUN   Kidney Function  PSA   Prostate    TSH   Thyroid Hormone  HgbA1c   Diabetes Test   Hgb (Hemoglobin)   Red Blood Cells

## 2022-02-16 NOTE — NURSING NOTE
Comprehensive Intake Entered On:  12/20/2019 9:57 AM CST    Performed On:  12/20/2019 9:55 AM CST by Khushbu Du               Summary   Chief Complaint :   Hospital f/up, post hip replacement.    Weight Measured :   226.2 lb(Converted to: 226 lb 3 oz, 102.60 kg)    Height Measured :   73 in(Converted to: 6 ft 1 in, 185.42 cm)    Body Mass Index :   29.84 kg/m2 (HI)    Body Surface Area :   2.3 m2   Systolic Blood Pressure :   122 mmHg   Diastolic Blood Pressure :   72 mmHg   Mean Arterial Pressure :   89 mmHg   Peripheral Pulse Rate :   59 bpm (LOW)    BP Method :   Electronic   HR Method :   Electronic   Temperature Tympanic :   98.9 DegF(Converted to: 37.2 DegC)    Khushbu Du - 12/20/2019 9:55 AM CST   Health Status   Allergies Verified? :   Yes   Medication History Verified? :   Yes   Pre-Visit Planning Status :   Completed   Tobacco Use? :   Never smoker   Khushbu Du - 12/20/2019 9:55 AM CST   Meds / Allergies   (As Of: 12/20/2019 9:57:09 AM CST)   Allergies (Active)   No Known Medication Allergies  Estimated Onset Date:   Unspecified ; Created By:   Demi Lujan CMA; Reaction Status:   Active ; Category:   Drug ; Substance:   No Known Medication Allergies ; Type:   Allergy ; Updated By:   Demi Lujan CMA; Reviewed Date:   11/20/2019 11:48 AM CST        Medication List   (As Of: 12/20/2019 9:57:09 AM CST)   Prescription/Discharge Order    albuterol  :   albuterol ; Status:   Prescribed ; Ordered As Mnemonic:   Ventolin HFA 90 mcg/inh inhalation aerosol ; Simple Display Line:   2 puff(s), Inhale, q4-6 hrs, 3 EA, 3 Refill(s) ; Ordering Provider:   Quinn Al MD; Catalog Code:   albuterol ; Order Dt/Tm:   8/1/2019 8:30:54 AM CDT            Home Meds    acetaminophen  :   acetaminophen ; Status:   Documented ; Ordered As Mnemonic:   acetaminophen 500 mg oral tablet ; Simple Display Line:   1,000 mg, 2 tab(s), Oral, q6 hrs, RX'd by [facility], PRN: for pain, 0 Refill(s) ; Catalog Code:    acetaminophen ; Order Dt/Tm:   12/20/2019 7:07:54 AM CST          oxyCODONE  :   oxyCODONE ; Status:   Documented ; Ordered As Mnemonic:   oxyCODONE 5 mg oral tablet ; Simple Display Line:   5 mg, 1 tab(s), Oral, q4 hrs, RX'd by [facility], PRN: for pain, 0 Refill(s) ; Catalog Code:   oxyCODONE ; Order Dt/Tm:   12/20/2019 7:08:27 AM CST          polyethylene glycol 3350  :   polyethylene glycol 3350 ; Status:   Documented ; Ordered As Mnemonic:   MiraLax oral powder for reconstitution ; Simple Display Line:   17 gm, Oral, daily, for 30 day(s), RX'd by [facility], 0 Refill(s) ; Catalog Code:   polyethylene glycol 3350 ; Order Dt/Tm:   12/20/2019 7:08:49 AM CST          clopidogrel  :   clopidogrel ; Status:   Documented ; Ordered As Mnemonic:   clopidogrel 75 mg oral tablet ; Simple Display Line:   75 mg, 1 tab(s), Oral, daily, 0 Refill(s) ; Catalog Code:   clopidogrel ; Order Dt/Tm:   11/21/2019 10:14:31 AM CST          atorvastatin  :   atorvastatin ; Status:   Documented ; Ordered As Mnemonic:   atorvastatin 80 mg oral tablet ; Simple Display Line:   0 Refill(s) ; Catalog Code:   atorvastatin ; Order Dt/Tm:   9/3/2019 11:19:36 AM CDT ; Comment:   Responsible Provider: JOSEE HOLT          lisinopril  :   lisinopril ; Status:   Documented ; Ordered As Mnemonic:   lisinopril 10 mg oral tablet ; Simple Display Line:   10 mg, 1 tab(s), Oral, daily, 0 Refill(s) ; Catalog Code:   lisinopril ; Order Dt/Tm:   3/19/2019 1:57:49 PM CDT          aspirin  :   aspirin ; Status:   Documented ; Ordered As Mnemonic:   aspirin 81 mg oral tablet, chewable ; Simple Display Line:   81 mg, 1 tab(s), PO, Daily, 30 tab(s), 0 Refill(s) ; Catalog Code:   aspirin ; Order Dt/Tm:   3/19/2019 1:54:21 PM CDT

## 2022-02-16 NOTE — PROGRESS NOTES
Patient:   TAMAR KENNEDY            MRN: [id number]            FIN: [id number]               Age:   65 years     Sex:  Male     :  1955   Associated Diagnoses:   Asthma; Elevated lipids; History of hip replacement; HTN (hypertension); Osteoarthritis of right hip   Author:   Quinn Al MD      Visit Information      Date of Service: 2021 11:20 am  Performing Location: [facility]  Encounter#: [id number]      Primary Care Provider (PCP):  Quinn Al MD    NPI# [id number]      Referring Provider:  Quinn Al MD# [id number]      Chief Complaint   3/5/2021 11:24 AM CST    SOB, cough, mucous.  Never picked up flovent inhaler.        History of Present Illness   Patient is here for shortness of breath.  He notes that he has long history of asthma and allergy issues.  He never did  the Flovent last year.  Usually uses his albuterol few times a week but now over the last month or better he is using it every day.  No chest pain no cough he still able do all of his work around the house and is very physical.  Wondering what more to do.  He tells me cardiology is trying to lose is a history of a NSTEMI MI and stent placement.  He still has chronic pain in his right hip even though he had it replaced.         Review of Systems   Constitutional:  Negative except as documented in history of present illness.    Eye:  Negative.    Ear/Nose/Mouth/Throat:  Negative.    Respiratory:  Negative except as documented in history of present illness.    Cardiovascular:  Negative.    Gastrointestinal:  Negative.    Genitourinary:  Negative.    Hematology/Lymphatics:  Negative.    Endocrine:  Negative.    Immunologic:  Negative.    Musculoskeletal:  Negative except as documented in history of present illness.    Integumentary:  Negative.    Neurologic:  Negative.       Health Status   Allergies:    Allergic Reactions (Selected)  No Known Medication Allergies   Medications:   (Selected)   Prescriptions  Prescribed  Advair Diskus 250 mcg-50 mcg inhalation powder: 1 puff(s), Inhale, bid, # 180 EA, 1 Refill(s), Type: Maintenance, Pharmacy: [facility] #[id number], 1 puff(s) Inhale bid, 73, in, 03/05/21 11:24:00 CST, Height Measured, 248.2, lb, 03/05/21 11:24:00 CST, Weight Measured  Ventolin HFA 90 mcg/inh inhalation aerosol: 2 puff(s), Inhale, q4-6 hrs, # 3 EA, 3 Refill(s), Type: Soft Stop, Pharmacy: [facility] #[id number], 2 puff(s) Inhale q4-6 hrs  atorvastatin 80 mg oral tablet: = 1 tab(s) ( 80 mg ), Oral, daily, # 90 tab(s), 3 Refill(s), Type: Maintenance, Pharmacy: [facility] #[id number], 1 tab(s) Oral daily  losartan 50 mg oral tablet: = 1 tab(s) ( 50 mg ), Oral, daily, # 90 tab(s), 1 Refill(s), Type: Maintenance, Pharmacy: [facility] #[id number], 1 tab(s) Oral daily, 73, in, 03/05/21 11:24:00 CST, Height Measured, 248.2, lb, 03/05/21 11:24:00 CST, Weight Measured  Documented Medications  Documented  aspirin 81 mg oral tablet, chewable: = 1 tab(s) ( 81 mg ), PO, Daily, # 30 tab(s), 0 Refill(s), Type: Maintenance   Problem list:    All Problems (Selected)  Allergic rhinitis / SNOMED CT 442551628 / Confirmed  Asthma / SNOMED CT [id number]-9WK0-[id number]-V80MQ776Q0Q3 / Confirmed  Coronary artery disease / SNOMED CT 42771411 / Confirmed  Family history of heart disease in male family member before age 55 / SNOMED CT 702830845 / Confirmed  History of non-ST elevation myocardial infarction (NSTEMI) / SNOMED CT 3736005356 / Confirmed  Elevated lipids / SNOMED CT 357250068 / Confirmed  Obesity / SNOMED CT 9757626399 / Probable  Osteoarthritis of right hip / SNOMED CT 2142905513 / Confirmed      Histories   Past Medical History:    Active  History of non-ST elevation myocardial infarction (NSTEMI) (5455993695): Onset on 3/15/2019 at 63 years.  Resolved  Inpatient stay ([id number]): Onset on 3/15/2019 at 63 years.  Resolved on 3/18/2019 at 63 years.  Comments:  3/27/2019 CDT  7:54 AM Shirlene Darling  @[email or web address], [place] - NSTEMI   Family History:    MI - Myocardial infarction  Father (Unknown, )     Procedure history:    Arthroscopy of hip (SNOMED CT 528542836) performed by Christopher Abraham MD on 2019 at 64 Years.  Comments:  2019 1:32 PM CST - Janel Momin  Total right.  Endovascular insertion of drug eluting stent (SNOMED CT 3753278760) on 3/18/2019 at 63 Years.  Comments:  3/27/2019 7:58 AM [facility] - Shirlene Rodrigues  x1 to LAD   Social History:        Electronic Cigarette/Vaping Assessment            Electronic Cigarette Use: Never.      Alcohol Assessment            Current, Beer (12 oz), Wine (5 oz), 3-5 times per week, 1 drinks/episode average.  2 drinks/episode maximum.      Tobacco Assessment            Former smoker, quit more than 30 days ago, Cigars      Substance Abuse Assessment            Past      Employment and Education Assessment            Employed, Work/School description: .      Home and Environment Assessment            Spouse/Partner name: Cielo Polk.      Nutrition and Health Assessment            Type of diet: Regular.      Exercise and Physical Activity Assessment            Exercise frequency: 5-6 times/week.  Exercise type: Walking, Weight lifting.      Sexual Assessment            Sexually active: Yes.  Sexual orientation: Heterosexual.        Physical Examination   Measurements from flowsheet : Measurements   3/5/2021 11:24 AM CST Height Measured - Standard 73 in    Weight Measured - Standard 248.2 lb    BSA 2.41 m2    Body Mass Index 32.74 kg/m2  HI      General:  Alert and oriented, No acute distress.    Neck:  Supple, Non-tender.    Respiratory:  Lungs are clear to auscultation, Respirations are non-labored.    Cardiovascular:  Normal rate, Regular rhythm, No murmur, No edema.    Gastrointestinal:  Soft, Non-tender.    Neurologic:  Alert, Oriented, Cranial Nerves II-XII are grossly intact.       Impression and  Plan   Diagnosis     Asthma (XJW38-NF J45.909).     Elevated lipids (YGM04-PO E78.5).     History of hip replacement (ANA60-DN Z96.649).     HTN (hypertension) (EZC63-NT I10).     Osteoarthritis of right hip (QRY79-MH M16.11).     Course:  Not progressing as expected.    Plan:  1.  Asthma with allergic rhinitis we will add Advair we taught him how to use it discussed compliance  2.  Hypertension we will switch his lisinopril to losartan labs in a month  3 hyperlipidemia on his atorvastatin  4.  Atherosclerotic heart disease status post NSTEMI and stent on aspirin he is very active with no symptoms  5 chronic pain after right hip replacement we will send him to Ortho  .

## 2022-02-16 NOTE — PROGRESS NOTES
Patient:   TAMAR KENNEDY            MRN: [id number]            FIN: [id number]               Age:   63 years     Sex:  Male     :  1955   Associated Diagnoses:   Asthma; Obesity   Author:   Seth Mckeon MD      Chief Complaint   2018 10:41 AM CDT    Needs refill of inhaler. c/o injuring left hand last night      History of Present Illness   see chief complaint as noted above and confirmed with the patient   63 year old male with known Asthma here for a refill of his inhalers. He is currently using albuterol. He was on Qvar regularly for a month, he felt he got more winded after starting it so he stopped taking it and improved. Winter is worse for his allergies and cats are a trigger so he avoids them.   injuried his left hand yesterday after puncturing it. Area is swollen, painful  and red . Denies any drainage. He is up to date on his tetnus shot.      Review of Systems   Constitutional:  No fever, No chills.    Ear/Nose/Mouth/Throat:  Negative.    Respiratory:  No shortness of breath, No cough.    Cardiovascular:  No chest pain.    Gastrointestinal:  No nausea, No vomiting.    Musculoskeletal:  No back pain.    Integumentary:  No rash.    Neurologic:  No headache.    Psychiatric:  Negative.              Health Status   Allergies:    Allergic Reactions (Selected)  No known allergies   Medications:  (Selected)   Prescriptions  Prescribed  Ventolin HFA 90 mcg/inh inhalation aerosol: See Instructions, Instructions: INHALE 2 PUFFS BY MOUTH EVERY 4 TO 6 HOURS, # 18 gm, Type: Soft Stop, Pharmacy: [facility] [id number]   Problem list:    All Problems  Obesity / SNOMED CT 4495027859 / Probable  Asthma / SNOMED CT [id number] / Confirmed  Canceled: Asthma / SNOMED CT [id number]      Histories   Past Medical History:    No active or resolved past medical history items have been selected or recorded.   Family History:       Procedure history:    No active  procedure history items have been selected or recorded.   Social History:        Alcohol Assessment            Current, Beer (12 oz), Wine (5 oz), 3-5 times per week, 1 drinks/episode average.  2 drinks/episode maximum.      Tobacco Assessment            Past      Substance Abuse Assessment            Past      Employment and Education Assessment            Employed, Work/School description: .      Home and Environment Assessment            Spouse/Partner name: Cielo Polk.      Nutrition and Health Assessment            Type of diet: Regular.      Exercise and Physical Activity Assessment            Exercise frequency: 5-6 times/week.  Exercise type: Walking, Weight lifting.      Sexual Assessment            Sexually active: Yes.  Sexual orientation: Heterosexual.        Physical Examination   Vital Signs   6/6/2018 10:41 AM CDT Peripheral Pulse Rate 64 bpm    Systolic Blood Pressure 124 mmHg    Diastolic Blood Pressure 72 mmHg    Mean Arterial Pressure 89 mmHg      Measurements from flowsheet : Measurements   6/6/2018 10:41 AM CDT Height Measured - Standard 73 in    Weight Measured - Standard 239.8 lb    BSA 2.36 m2    Body Mass Index 31.63 kg/m2  HI      General:  Alert and oriented, No acute distress.    Eye:  Pupils are equal, round and reactive to light, Normal conjunctiva.    HENT:  Oral mucosa is moist.    Neck:  Supple.    Respiratory:  Respirations are non-labored.    Cardiovascular:  Normal rate, Regular rhythm, No edema.    Gastrointestinal:  Non-distended.    Musculoskeletal:  Normal gait.    Integumentary:  Warm, No rash.    Psychiatric:  Cooperative, Appropriate mood & affect, Normal judgment.       Review / Management   Results review      Impression and Plan   Diagnosis     Asthma (DLY32-LP J45.909).     Obesity (QUR50-UW E66.9).     Plan:  He will continue using albuterol as needed. Will have him start singulair 10mg daily. Will update prevnar  today and have him get the pneumovax in 1  year. encouraged getting the flu shot this fall.  Will order colonoscopy. He will return for lipid panel and glucose. He is due next appt in 1 year or sooner for any concerns.    I, Cristiane Cooper [facility], acted solely as a scribe for, and in the presence of Dr. Seth Mckeon who performed the service..

## 2022-02-16 NOTE — PROGRESS NOTES
Patient:   TAMAR KENNEDY            MRN: [id number]            FIN: [id number]               Age:   64 years     Sex:  Male     :  1955   Associated Diagnoses:   Osteoarthritis of right hip   Author:   Oumar Reid MD      Visit Information      Date of Service: 2019 10:00 am  Performing Location: [facility]  Encounter#: [id number]      Primary Care Provider (PCP):  Seth Mckeon MD    NPI# [id number]      Referring Provider:  Oumar Reid MD    NPI# [id number]      Chief Complaint   9/3/2019 10:11 AM CDT    right hip pain - having hip replaced in October and would like to see if he can get an injection              Additional Information:No additional information recorded during visit.   Chief complaint and symptoms as noted above and confirmed with patient.  Recent lab and diagnostic studies reviewed with patient      History of Present Illness   9/3/2019: Manjinder presents with chronic right hip pain.  Recently assessed by Dr. Al including right hip MRI demonstrating significant degenerative changes and labral tear.  Has not been evaluated by orthopedics.  Here today primarily to inquire into intra-articular steroid injection.         Review of Systems   Constitutional:  No fever, No chills.    Eye:  Negative except as documented in history of present illness.    Ear/Nose/Mouth/Throat:  Negative except as documented in history of present illness.    Respiratory:  No shortness of breath.    Cardiovascular:  No chest pain, No palpitations.    Gastrointestinal:  No abdominal pain.    Genitourinary:  No dysuria, No hematuria.    Hematology/Lymphatics:  Negative except as documented in history of present illness.    Endocrine   Immunologic:  No recurrent fevers.    Musculoskeletal:  Joint pain, Decreased range of motion, No muscle pain.    Neurologic:  Alert and oriented X4, No numbness, No tingling, No headache.       Health Status   Allergies:    Allergic Reactions (Selected)  No known  allergies   Medications:  (Selected)   Prescriptions  Prescribed  Ventolin HFA 90 mcg/inh inhalation aerosol: 2 puff(s), Inhale, q4-6 hrs, # 3 EA, 3 Refill(s), Type: Soft Stop, Pharmacy: [facility] #[id number], 2 puff(s) Inhale q4-6 hrs  Documented Medications  Documented  aspirin 81 mg oral tablet, chewable: = 1 tab(s) ( 81 mg ), PO, Daily, # 30 tab(s), 0 Refill(s), Type: Maintenance  atorvastatin 40 mg oral tablet: = 2 tab(s) ( 80 mg ), Oral, daily, 0 Refill(s), Type: Maintenance  lisinopril 10 mg oral tablet: = 1 tab(s) ( 10 mg ), Oral, daily, 0 Refill(s), Type: Maintenance  ticagrelor 90 mg oral tablet: = 1 tab(s) ( 90 mg ), Oral, bid, 0 Refill(s), Type: Maintenance,    Medications          *denotes recorded medication          Ventolin HFA 90 mcg/inh inhalation aerosol: 2 puff(s), Inhale, q4-6 hrs, 3 EA, 3 Refill(s).          *aspirin 81 mg oral tablet, chewable: 81 mg, 1 tab(s), PO, Daily, 30 tab(s), 0 Refill(s).          *atorvastatin 40 mg oral tablet: 80 mg, 2 tab(s), Oral, daily, 0 Refill(s).          *lisinopril 10 mg oral tablet: 10 mg, 1 tab(s), Oral, daily, 0 Refill(s).          *ticagrelor 90 mg oral tablet: 90 mg, 1 tab(s), Oral, bid, 0 Refill(s).       Problem list:    All Problems  Asthma / SNOMED CT [id number] / Confirmed  Family history of heart disease in male family member before age 55 / SNOMED CT 698609185 / Confirmed  History of non-ST elevation myocardial infarction (NSTEMI) / SNOMED CT 4169892677 / Confirmed  Elevated lipids / SNOMED CT 275381006 / Confirmed  Obesity / SNOMED CT 3399112674 / Probable  Osteoarthritis of right hip / SNOMED CT 2585685494 / Confirmed  Resolved: Inpatient stay / SNOMED CT 454891098  Canceled: Asthma / SNOMED CT [id number]      Histories   Past Medical History:    Active  History of non-ST elevation myocardial infarction (NSTEMI) (4178889150): Onset on 3/15/2019 at 63 years.  Resolved  Inpatient stay  ([id number]): Onset on 3/15/2019 at 63 years.  Resolved on 3/18/2019 at 63 years.  Comments:  3/27/2019 CDT 7:54 AM [facility] - Shirlene Rodrigues  @[email or web address], [place] - NSTEMI   Family History:    MI - Myocardial infarction  Father (Unknown, )     Procedure history:    Endovascular insertion of drug eluting stent ([id number]) on 3/18/2019 at 63 Years.  Comments:  3/27/2019 7:58 AM CDT - Shirlene Rodrigues  x1 to LAD   Social History:        Alcohol Assessment            Current, Beer (12 oz), Wine (5 oz), 3-5 times per week, 1 drinks/episode average.  2 drinks/episode maximum.      Tobacco Assessment            Past      Substance Abuse Assessment            Past      Employment and Education Assessment            Employed, Work/School description: .      Home and Environment Assessment            Spouse/Partner name: Cielo Polk.      Nutrition and Health Assessment            Type of diet: Regular.      Exercise and Physical Activity Assessment            Exercise frequency: 5-6 times/week.  Exercise type: Walking, Weight lifting.      Sexual Assessment            Sexually active: Yes.  Sexual orientation: Heterosexual.        Physical Examination   vital signs stable, as noted above   Vital Signs   9/3/2019 10:11 AM CDT Temperature Tympanic 98.6 DegF    Peripheral Pulse Rate 60 bpm    Systolic Blood Pressure 120 mmHg    Diastolic Blood Pressure 70 mmHg    Mean Arterial Pressure 87 mmHg      Measurements from flowsheet : Measurements   9/3/2019 10:11 AM CDT    Weight Measured - Standard                212 lb     General:  Alert and oriented, No acute distress.    Eye:  Extraocular movements are intact.    HENT:  Normocephalic.    Neck:  Supple.    Respiratory:  Lungs are clear to auscultation, Respirations are non-labored.    Cardiovascular:  Normal rate, Regular rhythm.    Musculoskeletal:  Normal range of motion, Normal strength, Pains limited to internal rotation of right hip.  No pains to  lateral palpation.    Neurologic:  Alert, Oriented, Normal motor function, No focal deficits.    Cognition and Speech:  Oriented, Speech clear and coherent.    Psychiatric:  Appropriate mood & affect.       Impression and Plan   Diagnosis     Osteoarthritis of right hip (GTP48-SC M16.11).       .) Osteoarthritis of right hip  - MRI of right hip (7/2019): confirming significant degenerative changes with osteophyte development, degenerative labral tearing  - offering referral to Ortho to further evaluate, both for short-term steroid injection and to discuss future procedural interventions

## 2022-02-16 NOTE — PROGRESS NOTES
Patient:   TAMAR KENNEDY            MRN: [id number]            FIN: [id number]               Age:   63 years     Sex:  Male     :  1955   Associated Diagnoses:   Well adult exam; Non-ST elevation MI (NSTEMI)   Author:   Quinn Al MD      Visit Information      Date of Service: 03/15/2019 12:40 pm  Performing Location: [facility]  Encounter#: [id number]      Primary Care Provider (PCP):  Seth Mckeon MD    NPI# [id number]      Referring Provider:  Quinn Al MD    NPI# [id number]      Chief Complaint   3/15/2019 12:45 PM CDT   Px   Routine Health Care Visit      History of Present Illness             The patient presents for a general exam.  The patient's general health status is described as good.  The patient's diet is described as balanced.     Patient is in for physical.  He is a  wife is in good health.  He does have a history of asthma and uses albuterol about once a day.  He does not use any for maintenance.  He is concerned over some chest pain he had.  With all the snow he has been doing a lot of shoveling this week clearing up for of and he started to have exertional left-sided chest pain and midline chest pain.  It definitely comes on with exertion and seems to be coming on with less exertion.  No associated nausea vomiting diaphoresis.  He did fall off the roof before this came on though.  He has a strong family history of heart disease in his dad and brother early 80s both  from the heart disease.  He has a history of hyperlipidemia.         Review of Systems   Constitutional:  Negative.    Eye:  Negative.    Ear/Nose/Mouth/Throat:  Negative.    Respiratory:  Negative.    Cardiovascular:  Negative except as documented in history of present illness.    Gastrointestinal:  Negative.    Genitourinary:  Negative.    Hematology/Lymphatics:  Negative.    Endocrine:  Negative.    Immunologic:  Negative.    Musculoskeletal:  Negative.    Integumentary:   Negative.    Neurologic:  Negative.    Psychiatric:  Negative.    All other systems reviewed and negative      Health Status   Allergies:    Allergic Reactions (Selected)  No known allergies   Medications:  (Selected)   Prescriptions  Prescribed  Ventolin HFA 90 mcg/inh inhalation aerosol: See Instructions, Instructions: INHALE 2 PUFFS BY MOUTH EVERY 4 TO 6 HOURS, # 1 EA, 11 Refill(s), Type: Soft Stop, Pharmacy: [id number], INHALE 2 PUFFS BY MOUTH EVERY 4 TO 6 HOURS,    Medications          *denotes recorded medication          Ventolin HFA 90 mcg/inh inhalation aerosol: See Instructions, INHALE 2 PUFFS BY MOUTH EVERY 4 TO 6 HOURS, 1 EA, 11 Refill(s).     Problem list:    All Problems  Asthma / SNOMED CT [id number] / Confirmed  Obesity / SNOMED CT 3617297019 / Probable  Canceled: Asthma / SNOMED CT [id number]      Histories   Past Medical History:    No active or resolved past medical history items have been selected or recorded.   Family History:    Sister: Unknown      History is unknown.  Daughter: Long      History is unknown.  Sister: Unknown      History is unknown.  Brother: Unknown      History is unknown.  Brother: Unknown      History is unknown.  Brother: Unknown      History is unknown.  Sister: Unknown      History is unknown.     Procedure history:    No active procedure history items have been selected or recorded.   Social History:        Alcohol Assessment            Current, Beer (12 oz), Wine (5 oz), 3-5 times per week, 1 drinks/episode average.  2 drinks/episode maximum.      Tobacco Assessment            Past      Substance Abuse Assessment            Past      Employment and Education Assessment            Employed, Work/School description: .      Home and Environment Assessment            Spouse/Partner name: Cielo Polk.      Nutrition and Health Assessment            Type of diet: Regular.      Exercise and  Physical Activity Assessment            Exercise frequency: 5-6 times/week.  Exercise type: Walking, Weight lifting.      Sexual Assessment            Sexually active: Yes.  Sexual orientation: Heterosexual.      Physical Examination   Vital Signs   3/15/2019 12:45 PM CDT Temperature Tympanic 97.1 DegF  LOW    Peripheral Pulse Rate 51 bpm  LOW    HR Method Electronic    Systolic Blood Pressure 156 mmHg  HI    Diastolic Blood Pressure 90 mmHg  HI    Mean Arterial Pressure 112 mmHg    BP Method Electronic      Measurements from flowsheet : Measurements   3/15/2019 12:45 PM CDT Height Measured - Standard 73 in    Weight Measured - Standard 244.2 lb    BSA 2.39 m2    Body Mass Index 32.21 kg/m2  HI      General:  Alert and oriented.    Eye:  Pupils are equal, round and reactive to light, Normal conjunctiva.    HENT:  Normocephalic, Tympanic membranes are clear, Oral mucosa is moist.    Neck:  Supple, Non-tender, No lymphadenopathy, No thyromegaly.    Respiratory:  Breath sounds are equal, Symmetrical chest wall expansion.         Respirations: Are within normal limits.         Pattern: Regular.         Breath sounds: Bilateral, Within normal limits.    Cardiovascular:  Normal rate, Regular rhythm, No murmur, Good pulses equal in all extremities, Normal peripheral perfusion, No edema.    Gastrointestinal:  Soft, Non-tender, Non-distended, Normal bowel sounds.    Musculoskeletal:  No tenderness, No swelling.    Integumentary:  Warm, Dry.    Neurologic:  No focal deficits.    Cognition and Speech:  Oriented, Speech clear and coherent.    Psychiatric:  Appropriate mood & affect, Normal judgment.       Health Maintenance      Recommendations     Pending (in the next year)        OverDue           Depression Screen (Male) due  03/05/16  and every 1  year(s)           Lipid Disorders Screen (Male) due  03/23/18  and every 1  year(s)        Due            Alcohol Misuse Screen (Male) due  03/15/19  and every 1  year(s)            Aspirin Therapy for Prevention of CVD (Male) due  03/15/19  and every 5  year(s)           Asthma - Assessment due  03/15/19  and every 1  year(s)           Asthma - Spirometry due  03/15/19  and every 1  year(s)           Colorectal Cancer Screen (Colonoscopy) (Male) due  03/15/19  Variable frequency           Colorectal Cancer Screen (Occult Blood) (Male) due  03/15/19  Variable frequency           Colorectal Cancer Screen (Sigmoidoscopy) (Male) due  03/15/19  Variable frequency           HIV Screen (if sexually active) (Male) due  03/15/19  and every 1  year(s)           Hepatitis C Screen [id number] (Male) due  03/15/19  One-time only           Influenza Vaccine due  03/15/19  and every 1  year(s)           Lung Cancer Screen (Male) due  03/15/19  and every 1  year(s)           STD Counseling (if sexually active) (Male) due  03/15/19  and every 1  year(s)           Syphilis Screen (if sexually active) (Male) due  03/15/19  and every 1  year(s)           Type 2 Diabetes Mellitus Screen (Male) due  03/15/19  Variable frequency           Zoster/Shingles Vaccine due  03/15/19  One-time only     Satisfied (in the past 1 year)        Satisfied            Body Mass Index Check (Male) on  03/15/19.           Body Mass Index Check (Male) on  06/06/18.           High Blood Pressure Screen (Male) on  03/15/19.           High Blood Pressure Screen (Male) on  06/06/18.           Obesity Screen and Counseling (Male) on  03/15/19.           Obesity Screen and Counseling (Male) on  06/06/18.           Tobacco Use Screen (Male) on  03/15/19.           Tobacco Use Screen (Male) on  06/06/18.      Impression and Plan   Diagnosis     Well adult exam (HXX43-GU Z00.00).     Non-ST elevation MI (NSTEMI) (SUB46-NH I21.4).     Course:  Progressing as expected.    Plan:  hcm reviewed.    Patient Instructions:       Counseled: Patient, Diet, Activity, Verbalized understanding.    Counseled:  Patient, Routine exercise and healthy weight  maintenance discussed.    Patient Instructions:  Return to clinic in one year for next routine health visit, or sooner if problems or concerns.    patient with semi-acute MI and STEMI with no EKG changes but very high troponin.  He has a stuttering anginal component as well as he had chest pain just walking to the clinic today.  He sent to the emergency room where he will be transferred for further workup.

## 2022-02-16 NOTE — NURSING NOTE
Asthma Control Test (ACT) Total Entered On:  4/1/2020 4:23 PM CDT    Performed On:  4/1/2020 4:23 PM CDT by Luz Elena Wise CMA               Asthma Control Test (ACT) Total   Asthma Control Test Total (Adult) :   22    Luz Elena Wise CMA - 4/1/2020 4:23 PM CDT

## 2022-02-16 NOTE — PROGRESS NOTES
Patient:   TAMAR KENNEDY            MRN: [id number]            FIN: [id number]               Age:   64 years     Sex:  Male     :  1955   Associated Diagnoses:   None   Author:   Quinn Al MD      Procedure   EKG procedure   Indication: preop.     Position: supine.     EKG findings   Interpretation: by primary care provider.     Rhythm: sinus bradycardia.     P waves: normal.     Interpretation: Abmormal  LAFB, no ST-T wave abnormalities.     Discussed: with patient.        Impression and Plan   Orders

## 2022-02-16 NOTE — NURSING NOTE
Comprehensive Intake Entered On:  2021 11:18 AM CDT    Performed On:  2021 11:08 AM CDT by Luz Elena Wise CMA               Summary   Chief Complaint :   HTN/CAD f/u and refills.    Weight Measured :   228 lb(Converted to: 228 lb 0 oz, 103.419 kg)    Systolic Blood Pressure :   134 mmHg (HI)    Diastolic Blood Pressure :   79 mmHg   Mean Arterial Pressure :   97 mmHg   Peripheral Pulse Rate :   60 bpm   BP Site :   Right arm   Pulse Site :   Radial artery   BP Method :   Electronic   HR Method :   Electronic   Temperature Tympanic :   98.1 DegF(Converted to: 36.7 DegC)    [place] Luz Elena ORNELAS - 2021 11:08 AM CDT   Health Status   Allergies Verified? :   Yes   Medication History Verified? :   Yes   Pre-Visit Planning Status :   Completed   Tobacco Use? :   Former smoker   [place] Luz Elena ORNELAS - 2021 11:08 AM CDT   Demographics   Last Name :   BRENT   Address :   [address]   First Name :   TAMAR Sandoval Initial :   OLGA   Responsible Party Date of Birth () :   1955 CDT   City :   [place]   State :   [place]   Zip Code :   [address]   Contact Relationship to Patient (other) :   Patient   Frandy Luz Elena ORNELAS - 2021 11:08 AM CDT   Consents   Consent for Immunization Exchange :   Consent Granted   Consent for Immunizations to Providers :   Consent Granted   [place] Luz Elena ORNELAS - 2021 11:08 AM CDT   Meds / Allergies   (As Of: 2021 11:18:07 AM CDT)   Allergies (Active)   No Known Medication Allergies  Estimated Onset Date:   Unspecified ; Created By:   Demi Lujan CMA; Reaction Status:   Active ; Category:   Drug ; Substance:   No Known Medication Allergies ; Type:   Allergy ; Updated By:   Demi Lujan CMA; Reviewed Date:   2021 8:08 AM CDT        Medication List   (As Of: 2021 11:18:07 AM CDT)   Prescription/Discharge Order    albuterol  :   albuterol ; Status:   Prescribed ; Ordered As Mnemonic:   Ventolin HFA 90 mcg/inh inhalation aerosol ; Simple Display Line:   2  puff(s), Inhale, q4-6 hrs, 3 EA, 1 Refill(s) ; Ordering Provider:   Quinn Al MD; Catalog Code:   albuterol ; Order Dt/Tm:   4/2/2021 11:29:44 AM CDT          amLODIPine  :   amLODIPine ; Status:   Prescribed ; Ordered As Mnemonic:   amLODIPine 5 mg oral tablet ; Simple Display Line:   5 mg, 1 tab(s), Oral, daily, 90 tab(s), 1 Refill(s) ; Ordering Provider:   Quinn Al MD; Catalog Code:   amLODIPine ; Order Dt/Tm:   4/2/2021 10:58:11 AM CDT          atorvastatin  :   atorvastatin ; Status:   Prescribed ; Ordered As Mnemonic:   atorvastatin 80 mg oral tablet ; Simple Display Line:   80 mg, 1 tab(s), Oral, daily, 90 tab(s), 1 Refill(s) ; Ordering Provider:   Quinn Al MD; Catalog Code:   atorvastatin ; Order Dt/Tm:   4/2/2021 11:30:25 AM CDT          fluticasone-salmeterol  :   fluticasone-salmeterol ; Status:   Prescribed ; Ordered As Mnemonic:   Advair Diskus 250 mcg-50 mcg inhalation powder ; Simple Display Line:   1 puff(s), Inhale, bid, 180 EA, 1 Refill(s) ; Ordering Provider:   Quinn Al MD; Catalog Code:   fluticasone-salmeterol ; Order Dt/Tm:   4/2/2021 11:29:43 AM CDT          indomethacin  :   indomethacin ; Status:   Prescribed ; Ordered As Mnemonic:   indomethacin 50 mg oral capsule ; Simple Display Line:   50 mg, 1 cap(s), Oral, tid, PRN: for gout pain, 30 cap(s), 0 Refill(s) ; Ordering Provider:   Fito Esquivel PA-C; Catalog Code:   indomethacin ; Order Dt/Tm:   5/21/2021 8:07:47 AM CDT          losartan  :   losartan ; Status:   Prescribed ; Ordered As Mnemonic:   losartan 50 mg oral tablet ; Simple Display Line:   50 mg, 1 tab(s), Oral, daily, 90 tab(s), 1 Refill(s) ; Ordering Provider:   Quinn Al MD; Catalog Code:   losartan ; Order Dt/Tm:   4/2/2021 11:30:24 AM CDT            Home Meds    acetaminophen  :   acetaminophen ; Status:   Documented ; Ordered As Mnemonic:   acetaminophen 325 mg oral tablet ; Simple Display Line:   650 mg, 2 tab(s), Oral, q4  hrs, 0 Refill(s) ; Catalog Code:   acetaminophen ; Order Dt/Tm:   4/5/2021 10:36:14 AM CDT          aspirin  :   aspirin ; Status:   Documented ; Ordered As Mnemonic:   aspirin 81 mg oral tablet, chewable ; Simple Display Line:   81 mg, 1 tab(s), PO, Daily, 30 tab(s), 0 Refill(s) ; Catalog Code:   aspirin ; Order Dt/Tm:   3/19/2019 1:54:21 PM CDT          ibuprofen  :   ibuprofen ; Status:   Documented ; Ordered As Mnemonic:   ibuprofen 200 mg oral tablet ; Simple Display Line:   400 mg, 2 tab(s), Oral, q4 hrs, PRN: for pain, 120 tab(s), 0 Refill(s) ; Catalog Code:   ibuprofen ; Order Dt/Tm:   4/5/2021 10:37:02 AM CDT          naproxen  :   naproxen ; Status:   Documented ; Ordered As Mnemonic:   naproxen sodium 220 mg oral tablet ; Simple Display Line:   220 mg, 1 tab(s), Oral, q8 hrs, PRN: for pain, 30 tab(s), 0 Refill(s) ; Catalog Code:   naproxen ; Order Dt/Tm:   4/5/2021 10:37:16 AM CDT          nitroglycerin  :   nitroglycerin ; Status:   Documented ; Ordered As Mnemonic:   nitroglycerin 0.4 mg sublingual tablet ; Simple Display Line:   0 Refill(s) ; Catalog Code:   nitroglycerin ; Order Dt/Tm:   4/5/2021 10:35:22 AM CDT ; Comment:   Responsible Provider: JOSEE HOLT

## 2022-02-16 NOTE — NURSING NOTE
Comprehensive Intake Entered On:  8/1/2019 8:17 AM CDT    Performed On:  8/1/2019 8:14 AM CDT by Luz Elena Wise CMA               Summary   Chief Complaint :   f/u R hip MRI.   Systolic Blood Pressure :   122 mmHg   Diastolic Blood Pressure :   78 mmHg   Mean Arterial Pressure :   93 mmHg   Peripheral Pulse Rate :   52 bpm (LOW)    BP Site :   Right arm   Pulse Site :   Radial artery   BP Method :   Electronic   HR Method :   Electronic   Luz Elena Wise CMA - 8/1/2019 8:14 AM CDT   Health Status   Allergies Verified? :   Yes   Medication History Verified? :   Yes   Pre-Visit Planning Status :   Completed   Tobacco Use? :   Never smoker   Luz Elena Wise CMA - 8/1/2019 8:14 AM CDT   Meds / Allergies   (As Of: 8/1/2019 8:17:31 AM CDT)   Allergies (Active)   No known allergies  Estimated Onset Date:   Unspecified ; Created By:   Pily Cooper CMA; Reaction Status:   Active ; Category:   Drug ; Substance:   No known allergies ; Type:   Allergy ; Updated By:   Pily Cooper CMA; Reviewed Date:   7/23/2019 4:15 PM CDT        Medication List   (As Of: 8/1/2019 8:17:31 AM CDT)   Prescription/Discharge Order    albuterol  :   albuterol ; Status:   Prescribed ; Ordered As Mnemonic:   Ventolin HFA 90 mcg/inh inhalation aerosol ; Simple Display Line:   See Instructions, INHALE 2 PUFFS BY MOUTH EVERY 4 TO 6 HOURS, 1 EA, 11 Refill(s) ; Ordering Provider:   Seth Mckeon MD; Catalog Code:   albuterol ; Order Dt/Tm:   6/6/2018 11:04:15 AM            Home Meds    aspirin  :   aspirin ; Status:   Documented ; Ordered As Mnemonic:   aspirin 81 mg oral tablet, chewable ; Simple Display Line:   81 mg, 1 tab(s), PO, Daily, 30 tab(s), 0 Refill(s) ; Catalog Code:   aspirin ; Order Dt/Tm:   3/19/2019 1:54:21 PM          atorvastatin  :   atorvastatin ; Status:   Documented ; Ordered As Mnemonic:   atorvastatin 40 mg oral tablet ; Simple Display Line:   80 mg, 2 tab(s), Oral, daily, 0 Refill(s) ; Catalog Code:    atorvastatin ; Order Dt/Tm:   3/19/2019 1:56:39 PM          lisinopril  :   lisinopril ; Status:   Documented ; Ordered As Mnemonic:   lisinopril 10 mg oral tablet ; Simple Display Line:   10 mg, 1 tab(s), Oral, daily, 0 Refill(s) ; Catalog Code:   lisinopril ; Order Dt/Tm:   3/19/2019 1:57:49 PM          ticagrelor  :   ticagrelor ; Status:   Documented ; Ordered As Mnemonic:   ticagrelor 90 mg oral tablet ; Simple Display Line:   90 mg, 1 tab(s), Oral, bid, 0 Refill(s) ; Catalog Code:   ticagrelor ; Order Dt/Tm:   3/19/2019 1:58:36 PM

## 2022-02-16 NOTE — PROGRESS NOTES
Patient:   TAMAR KENNEDY            MRN: [id number]            FIN: [id number]               Age:   64 years     Sex:  Male     :  1955   Associated Diagnoses:   Chronic right hip pain   Author:   Quinn Al MD      Visit Information      Date of Service: 2019 03:40 pm  Performing Location: [facility]  Encounter#: [id number]      Primary Care Provider (PCP):  Seth Mckeon MD    NPI# [id number]      Referring Provider:  Quinn Al MD    NPI# [id number]      Chief Complaint   2019 3:50 PM CDT    Pt c/o R groin pain on/off x 2 years. Was injured around that time. Has been seen for.        History of Present Illness   chief complaint and symptoms as noted above confirmed with patient   Walks awkwardly because of it  no click or pop  Injured heavy lifting      Review of Systems   Constitutional:  Negative except as documented in history of present illness.    Musculoskeletal:  Negative except as documented in history of present illness.    Integumentary:  Negative.    Neurologic:  Negative.       Health Status   Allergies:    Allergic Reactions (Selected)  No known allergies   Medications:  (Selected)   Prescriptions  Prescribed  Ventolin HFA 90 mcg/inh inhalation aerosol: See Instructions, Instructions: INHALE 2 PUFFS BY MOUTH EVERY 4 TO 6 HOURS, # 1 EA, 11 Refill(s), Type: Soft Stop, [id number], INHALE 2 PUFFS BY MOUTH EVERY 4 TO 6 HOURS  Documented Medications  Documented  aspirin 81 mg oral tablet, chewable: = 1 tab(s) ( 81 mg ), PO, Daily, # 30 tab(s), 0 Refill(s), Type: Maintenance  atorvastatin 40 mg oral tablet: = 2 tab(s) ( 80 mg ), Oral, daily, 0 Refill(s), Type: Maintenance  lisinopril 10 mg oral tablet: = 1 tab(s) ( 10 mg ), Oral, daily, 0 Refill(s), Type: Maintenance  ticagrelor 90 mg oral tablet: = 1 tab(s) ( 90 mg ), Oral, bid, 0 Refill(s), Type: Maintenance,    Medications          *denotes recorded medication          Ventolin  HFA 90 mcg/inh inhalation aerosol: See Instructions, INHALE 2 PUFFS BY MOUTH EVERY 4 TO 6 HOURS, 1 EA, 11 Refill(s).          *aspirin 81 mg oral tablet, chewable: 81 mg, 1 tab(s), PO, Daily, 30 tab(s), 0 Refill(s).          *atorvastatin 40 mg oral tablet: 80 mg, 2 tab(s), Oral, daily, 0 Refill(s).          *lisinopril 10 mg oral tablet: 10 mg, 1 tab(s), Oral, daily, 0 Refill(s).          *ticagrelor 90 mg oral tablet: 90 mg, 1 tab(s), Oral, bid, 0 Refill(s).     Problem list:    All Problems (Selected)  Asthma / SNOMED CT [id number] / Confirmed  Family history of heart disease in male family member before age 55 / SNOMED CT 800051382 / Confirmed  History of non-ST elevation myocardial infarction (NSTEMI) / SNOMED CT 5481343972 / Confirmed  Elevated lipids / SNOMED CT 598064228 / Confirmed  Obesity / SNOMED CT 3206271844 / Probable      Histories   Past Medical History:    Active  History of non-ST elevation myocardial infarction (NSTEMI) (4795118863): Onset on 3/15/2019 at 63 years.  Resolved  Inpatient stay ([id number]): Onset on 3/15/2019 at 63 years.  Resolved on 3/18/2019 at 63 years.  Comments:  3/27/2019 CDT 7:54 AM Shirlene Darling  @[email or web address], [place] - NSTEMI   Family History:    MI - Myocardial infarction  Father (Unknown, )     Procedure history:    Endovascular insertion of drug eluting stent (SNOMED CT 4587219906) on 3/18/2019 at 63 Years.  Comments:  3/27/2019 7:58 AM Shirlene Darling  x1 to LAD   Social History:        Alcohol Assessment            Current, Beer (12 oz), Wine (5 oz), 3-5 times per week, 1 drinks/episode average.  2 drinks/episode maximum.      Tobacco Assessment            Past      Substance Abuse Assessment            Past      Employment and Education Assessment            Employed, Work/School description: .      Home and Environment Assessment            Spouse/Partner name: Cielo Polk.      Nutrition and Health  Assessment            Type of diet: Regular.      Exercise and Physical Activity Assessment            Exercise frequency: 5-6 times/week.  Exercise type: Walking, Weight lifting.      Sexual Assessment            Sexually active: Yes.  Sexual orientation: Heterosexual.      Physical Examination   Vital Signs   7/23/2019 3:50 PM CDT Temperature Tympanic 97.8 DegF  LOW    Peripheral Pulse Rate 60 bpm    Pulse Site Radial artery    HR Method Manual    Systolic Blood Pressure 116 mmHg    Diastolic Blood Pressure 68 mmHg    Mean Arterial Pressure 84 mmHg    BP Site Right arm    BP Method Manual      Measurements from flowsheet : Measurements   7/23/2019 3:50 PM CDT    Weight Measured - Standard                212 lb     General:  Alert and oriented, No acute distress.    Musculoskeletal:       Lower extremity exam: Hip ( Right, Tenderness, Normal range of motion ).    Neurologic:  Alert, Oriented.       Impression and Plan   Diagnosis     Chronic right hip pain (OHC49-PD M25.551).     Course:  Not progressing as expected.    Plan:  mri hip.    Patient Instructions:       Counseled: Patient, Regarding diagnosis, Regarding treatment, Regarding medications, Activity.

## 2022-02-16 NOTE — TELEPHONE ENCOUNTER
---------------------From: Shirlene Rodrigues To:  [email or web address]>;   Sent: 9/9/2019 6:32:09 AM CDTSubject: General Message Patient: TAMAR KENNEDY; YOB: 1955 The attached Referral Note is for an appointment scheduled with Dr. Abraham in [place] on 9-11-19 at 2:30.

## 2022-02-16 NOTE — NURSING NOTE
Comprehensive Intake Entered On:  11/21/2019 10:15 AM CST    Performed On:  11/21/2019 10:09 AM CST by Chantell Garcia               Summary   Chief Complaint :   Cardio clearance for hip surgery 12/16/19.132   Weight Measured :   219 lb(Converted to: 219 lb 0 oz, 99.34 kg)    Height Measured :   73 in(Converted to: 6 ft 1 in, 185.42 cm)    Body Mass Index :   28.89 kg/m2 (HI)    Body Surface Area :   2.26 m2   Systolic Blood Pressure :   132 mmHg (HI)    Diastolic Blood Pressure :   75 mmHg   Mean Arterial Pressure :   94 mmHg   Peripheral Pulse Rate :   73 bpm   Chantell Garcia - 11/21/2019 10:09 AM CST   Health Status   Allergies Verified? :   Yes   Medication History Verified? :   Yes   Medical History Verified? :   Yes   Pre-Visit Planning Status :   Completed   Tobacco Use? :   Never smoker   Chantell Garcia - 11/21/2019 10:09 AM CST   Meds / Allergies   (As Of: 11/21/2019 10:15:18 AM CST)   Allergies (Active)   No Known Medication Allergies  Estimated Onset Date:   Unspecified ; Created By:   Demi Lujan CMA; Reaction Status:   Active ; Category:   Drug ; Substance:   No Known Medication Allergies ; Type:   Allergy ; Updated By:   Demi Lujan CMA; Reviewed Date:   11/20/2019 11:48 AM CST        Medication List   (As Of: 11/21/2019 10:15:18 AM CST)   Prescription/Discharge Order    albuterol  :   albuterol ; Status:   Prescribed ; Ordered As Mnemonic:   Ventolin HFA 90 mcg/inh inhalation aerosol ; Simple Display Line:   2 puff(s), Inhale, q4-6 hrs, 3 EA, 3 Refill(s) ; Ordering Provider:   Quinn Al MD; Catalog Code:   albuterol ; Order Dt/Tm:   8/1/2019 8:30:54 AM CDT            Home Meds    aspirin  :   aspirin ; Status:   Documented ; Ordered As Mnemonic:   aspirin 81 mg oral tablet, chewable ; Simple Display Line:   81 mg, 1 tab(s), PO, Daily, 30 tab(s), 0 Refill(s) ; Catalog Code:   aspirin ; Order Dt/Tm:   3/19/2019 1:54:21 PM CDT          ticagrelor  :   ticagrelor ;  Status:   Documented ; Ordered As Mnemonic:   ticagrelor 90 mg oral tablet ; Simple Display Line:   90 mg, 1 tab(s), Oral, bid, 0 Refill(s) ; Catalog Code:   ticagrelor ; Order Dt/Tm:   3/19/2019 1:58:36 PM CDT          lisinopril  :   lisinopril ; Status:   Documented ; Ordered As Mnemonic:   lisinopril 10 mg oral tablet ; Simple Display Line:   10 mg, 1 tab(s), Oral, daily, 0 Refill(s) ; Catalog Code:   lisinopril ; Order Dt/Tm:   3/19/2019 1:57:49 PM CDT          atorvastatin  :   atorvastatin ; Status:   Documented ; Ordered As Mnemonic:   atorvastatin 80 mg oral tablet ; Simple Display Line:   0 Refill(s) ; Catalog Code:   atorvastatin ; Order Dt/Tm:   9/3/2019 11:19:36 AM CDT ; Comment:   Responsible Provider: JOSEE HOLT          clopidogrel  :   clopidogrel ; Status:   Documented ; Ordered As Mnemonic:   clopidogrel 75 mg oral tablet ; Simple Display Line:   75 mg, 1 tab(s), Oral, daily, 0 Refill(s) ; Catalog Code:   clopidogrel ; Order Dt/Tm:   11/21/2019 10:14:31 AM CST

## 2022-02-16 NOTE — PROGRESS NOTES
Patient:   TAMAR KENNEDY            MRN: [id number]            FIN: [id number]               Age:   64 years     Sex:  Male     :  1955   Associated Diagnoses:   Osteoarthritis of right hip; History of non-ST elevation myocardial infarction (NSTEMI)   Author:   Quinn Al MD      Preoperative Information   Indication for surgery:  DJD right hip.    Accompanied by:  No one.    Source of history:  Self.           Chief Complaint   2019 11:46 AM CST  Pt here today for pre op. R hip replacement on 2019 with Dr. Abraham @ [facility]      Review of Systems   Constitutional:  Negative.    Eye:  Negative.    Ear/Nose/Mouth/Throat:  Negative.    Respiratory:  Negative.    Cardiovascular:  Negative.    Gastrointestinal:  Negative.    Genitourinary:  Negative.    Hematology/Lymphatics:  Negative.    Endocrine:  Negative.    Immunologic:  Negative.    Musculoskeletal:  Joint pain.    Integumentary:  Negative.    Neurologic:  Negative.       Health Status   Allergies:    Allergic Reactions (Selected)  No Known Medication Allergies   Medications:  (Selected)   Prescriptions  Prescribed  Ventolin HFA 90 mcg/inh inhalation aerosol: 2 puff(s), Inhale, q4-6 hrs, # 3 EA, 3 Refill(s), Type: Soft Stop, Pharmacy: [id number], 2 puff(s) Inhale q4-6 hrs  Documented Medications  Documented  aspirin 81 mg oral tablet, chewable: = 1 tab(s) ( 81 mg ), PO, Daily, # 30 tab(s), 0 Refill(s), Type: Maintenance  atorvastatin 80 mg oral tablet: 0 Refill(s), Type: Soft Stop  lisinopril 10 mg oral tablet: = 1 tab(s) ( 10 mg ), Oral, daily, 0 Refill(s), Type: Maintenance  ticagrelor 90 mg oral tablet: = 1 tab(s) ( 90 mg ), Oral, bid, 0 Refill(s), Type: Maintenance,    Medications          *denotes recorded medication          Ventolin HFA 90 mcg/inh inhalation aerosol: 2 puff(s), Inhale, q4-6 hrs, 3 EA, 3 Refill(s).          *aspirin 81 mg oral tablet, chewable: 81 mg, 1 tab(s), PO, Daily, 30 tab(s), 0 Refill(s).           *atorvastatin 80 mg oral tablet: 0 Refill(s).          *lisinopril 10 mg oral tablet: 10 mg, 1 tab(s), Oral, daily, 0 Refill(s).          *ticagrelor 90 mg oral tablet: 90 mg, 1 tab(s), Oral, bid, 0 Refill(s).       Problem list:    All Problems  Allergic rhinitis / SNOMED CT 871976411 / Confirmed  Asthma / SNOMED CT [id number] / Confirmed  Family history of heart disease in male family member before age 55 / SNOMED CT 399521705 / Confirmed  History of non-ST elevation myocardial infarction (NSTEMI) / SNOMED CT 7094293095 / Confirmed  Elevated lipids / SNOMED CT 021059451 / Confirmed  Obesity / SNOMED CT 1329900893 / Probable  Osteoarthritis of right hip / SNOMED CT 5613667803 / Confirmed  Resolved: Inpatient stay / SNOMED CT 040998479  Canceled: Asthma / SNOMED CT [id number]      Histories   Past Medical History:    Active  History of non-ST elevation myocardial infarction (NSTEMI) (3797764228): Onset on 3/15/2019 at 63 years.  Resolved  Inpatient stay (333509041): Onset on 3/15/2019 at 63 years.  Resolved on 3/18/2019 at 63 years.  Comments:  3/27/2019 CDT 7:54 AM Shirlene Darling  @[place], [place] - NSTEMI   Family History:    MI - Myocardial infarction  Father (Unknown, )     Procedure history:    Endovascular insertion of drug eluting stent (SNOMED CT 8295757877) on 3/18/2019 at 63 Years.  Comments:  3/27/2019 7:58 AM Shirlene Darling  x1 to LAD   Social History:        Alcohol Assessment            Current, Beer (12 oz), Wine (5 oz), 3-5 times per week, 1 drinks/episode average.  2 drinks/episode maximum.      Tobacco Assessment            Past      Substance Abuse Assessment            Past      Employment and Education Assessment            Employed, Work/School description: .      Home and Environment Assessment            Spouse/Partner name: Cielo Polk.      Nutrition and Health Assessment            Type of  diet: Regular.      Exercise and Physical Activity Assessment            Exercise frequency: 5-6 times/week.  Exercise type: Walking, Weight lifting.      Sexual Assessment            Sexually active: Yes.  Sexual orientation: Heterosexual.       Has  no history of anemia.  Has  no history of DVT or pulmonary embolism.  Has no personal history of bleeding problems.   Has  no personal or family history of anesthesia reactions.  Patient does not have active tuberculosis.    S/he has / has not taken aspirin or aspirin containing products in the last week.     S/he has / has not taken Plavix (Clopidogrel) in the last 2 weeks.    S/he  has not taken warfarin in the past week.    S/he has not been on corticosteroids for more than 2 weeks recently.      S/he  is not DNR before, during or after surgery.    Chest pain / SOB walking up 2 flights of steps? no  Pain in neck or jaw?no  CAD MI?  yes s/p nstemi with stent 3/2019  Afib? no  Heart Failure?no  Asthma  or Bronchitis? no  Diabetes? no       Insulin/Orals?   Seizure Disorder? no  CKD?no  Thyroid Disease?no  Liver Disease no  CVA?no         Physical Examination   Vital Signs   11/20/2019 11:46 AM CST Peripheral Pulse Rate 65 bpm    HR Method Electronic    Systolic Blood Pressure 115 mmHg    Diastolic Blood Pressure 72 mmHg    Mean Arterial Pressure 86 mmHg    BP Site Right arm    BP Method Manual      Measurements from flowsheet : Measurements   11/20/2019 11:46 AM CST Height Measured - Standard 73 in    Weight Measured - Standard 216.8 lb    BSA 2.25 m2    Body Mass Index 28.6 kg/m2  HI      General:  Alert and oriented, No acute distress.    Eye:  Pupils are equal, round and reactive to light, Extraocular movements are intact.    HENT:  Normocephalic, Tympanic membranes are clear, Normal hearing, Oral mucosa is moist.    Neck:  Supple, Non-tender, No carotid bruit, No jugular venous distention, No lymphadenopathy, No thyromegaly.    Respiratory:  Lungs are clear to  auscultation, Respirations are non-labored, Breath sounds are equal.    Cardiovascular:  Normal rate, Regular rhythm, No murmur, Good pulses equal in all extremities, No edema.    Gastrointestinal:  Soft, Non-tender, Non-distended, Normal bowel sounds, No organomegaly.    Musculoskeletal:  No tenderness, No swelling, degenerative changes noted.    Integumentary:  Warm, Dry.    Neurologic:  Alert, Oriented, Normal sensory, Normal motor function, No focal deficits, Cranial Nerves II-XII are grossly intact, Normal deep tendon reflexes.    Psychiatric:  Cooperative, Appropriate mood & affect, Normal judgment.       Health Maintenance      Recommendations     Pending (in the next year)        OverDue           Depression Screen (Male) due  03/05/16  and every 1  year(s)           Influenza Vaccine due  09/01/19  and every 1  year(s)        Due            Alcohol Misuse Screen (Male) due  11/20/19  and every 1  year(s)           Asthma - Assessment due  11/20/19  and every 1  year(s)           Asthma - Spirometry due  11/20/19  and every 1  year(s)           Colorectal Cancer Screen (Colonoscopy) (Male) due  11/20/19  Variable frequency           Colorectal Cancer Screen (Occult Blood) (Male) due  11/20/19  Variable frequency           Colorectal Cancer Screen (Sigmoidoscopy) (Male) due  11/20/19  Variable frequency           HIV Screen (if sexually active) (Male) due  11/20/19  and every 1  year(s)           Hepatitis C Screen 9103-1883 (Male) due  11/20/19  One-time only           Lung Cancer Screen (Male) due  11/20/19  and every 1  year(s)           STD Counseling (if sexually active) (Male) due  11/20/19  and every 1  year(s)           Syphilis Screen (if sexually active) (Male) due  11/20/19  and every 1  year(s)           Type 2 Diabetes Mellitus Screen (Male) due  11/20/19  Variable frequency           Zoster/Shingles Vaccine due  11/20/19  One-time only        Due In Future            Lipid Disorders Screen  (Male) not due until  05/16/20  and every 1  year(s)           Tobacco Use Screen (Male) not due until  08/01/20  and every 1  year(s)     Satisfied (in the past 1 year)        Satisfied            Aspirin Therapy for Prevention of CVD (Male) on  03/18/19.           Body Mass Index Check (Male) on  11/20/19.           Body Mass Index Check (Male) on  05/16/19.           Body Mass Index Check (Male) on  03/15/19.           High Blood Pressure Screen (Male) on  11/20/19.           High Blood Pressure Screen (Male) on  09/30/19.           High Blood Pressure Screen (Male) on  09/03/19.           High Blood Pressure Screen (Male) on  08/01/19.           High Blood Pressure Screen (Male) on  07/23/19.           High Blood Pressure Screen (Male) on  05/16/19.           High Blood Pressure Screen (Male) on  03/15/19.           Lipid Disorders Screen (Male) on  05/16/19.           Lipid Disorders Screen (Male) on  05/16/19.           Lipid Disorders Screen (Male) on  05/16/19.           Lipid Disorders Screen (Male) on  05/16/19.           Lipid Disorders Screen (Male) on  03/15/19.           Lipid Disorders Screen (Male) on  03/15/19.           Lipid Disorders Screen (Male) on  03/15/19.           Lipid Disorders Screen (Male) on  03/15/19.           Obesity Screen and Counseling (Male) on  11/20/19.           Obesity Screen and Counseling (Male) on  09/03/19.           Obesity Screen and Counseling (Male) on  07/23/19.           Obesity Screen and Counseling (Male) on  05/16/19.           Obesity Screen and Counseling (Male) on  03/15/19.           Tobacco Use Screen (Male) on  08/01/19.           Tobacco Use Screen (Male) on  03/15/19.          Review / Management            Impression and Plan   Diagnosis     Osteoarthritis of right hip (MFI14-KA M16.11).     History of non-ST elevation myocardial infarction (NSTEMI) (JWM04-IA I25.2).     Condition:  will be seeing cardiology tomorrow for their clearance and discussion  on asa and ticagralor  ok for surgery asa2 labs pending ekg is ok.

## 2022-02-16 NOTE — PROGRESS NOTES
Patient:   TAMAR KENNEDY            MRN: [id number]            FIN: [id number]               Age:   61 years     Sex:  Male     :  1955   Associated Diagnoses:   Obesity; Asthma; Annual physical exam   Author:   Seth Mckeon MD      Chief Complaint   3/21/2017 10:22 AM CDT   Pt here for asthma check.      History of Present Illness   See chief complaint as noted above and confirmed with the patient.    61 year old male presents today for a well adult exam.  He describes his current health status as good and stable.  Denies any effects on daily living in regards to his health status.  Patient's activity level is described a regular.    Patient has had no hospitalizations or emergency department visits in the past year. denies  Tobacco use.    Denies back or knee problems. He is allergic to cats.        Weight one year ago 233 lbs.  Todays weight  236 lbs.  Colon cancer screening status:  Due    Immunizations:  UTD: Adacel   Asthma: takes Ventolin , does salt washes. He was prescribed Pulmicort but did not start because he the side effects listed worried him. He feels he uses ventolin more than he should.  takes ventolin 1-2 times per day      Review of Systems   Constitutional:  Negative, No fever, No chills.    Eye   Ear/Nose/Mouth/Throat:  Negative, No nasal congestion, No sore throat.    Respiratory:  Negative, No shortness of breath.    Cardiovascular:  Negative.    Breast   Gastrointestinal:  Negative, No nausea, No vomiting, No diarrhea, No constipation.    Genitourinary:  No dysuria.    Gynecologic   Hematology/Lymphatics:  No bruising tendency, No swollen lymph glands.    Endocrine   Immunologic:  No recurrent fevers, No recurrent infections.    Musculoskeletal:  Negative, No muscle pain.    Integumentary:  Negative, No rash.    Neurologic:  No tingling, No headache.    Psychiatric:  Negative.              Health Status   Allergies:    Allergic Reactions (Selected)  No known allergies    Medications:  (Selected)   Prescriptions  Prescribed  Ventolin HFA 90 mcg/inh inhalation aerosol: See Instructions, Instructions: INHALE 2 PUFFS BY MOUTH EVERY 4 TO 6 HOURS AS NEEDED FOR WHEEZING, # 18 gm, Type: Soft Stop, Pharmacy: [facility] [id number]   Problem list:    All Problems  Obesity / SNOMED CT 0522871869 / Probable  Asthma / SNOMED CT [id number] / Confirmed  Canceled: Asthma / SNOMED CT [id number]      Histories   Past Medical History:    No active or resolved past medical history items have been selected or recorded.   Family History:       Procedure history:    No active procedure history items have been selected or recorded.   Social History:        Alcohol Assessment            Current, Beer (12 oz), Wine (5 oz), 3-5 times per week, 1 drinks/episode average.  2 drinks/episode maximum.      Tobacco Assessment            Past      Substance Abuse Assessment            Past      Employment and Education Assessment            Employed, Work/School description: .      Home and Environment Assessment            Spouse/Partner name: Cielo Polk.      Nutrition and Health Assessment            Type of diet: Regular.      Exercise and Physical Activity Assessment            Exercise frequency: 5-6 times/week.  Exercise type: Walking, Weight lifting.      Sexual Assessment            Sexually active: Yes.  Sexual orientation: Heterosexual.        Physical Examination   Vital Signs   3/21/2017 10:22 AM CDT Temperature Tympanic 98.0 DegF    Peripheral Pulse Rate 68 bpm    Pulse Site Radial artery    Systolic Blood Pressure 130 mmHg    Diastolic Blood Pressure 76 mmHg    Mean Arterial Pressure 94 mmHg    BP Site Right arm      Measurements from flowsheet : Measurements   3/21/2017 10:22 AM CDT Height Measured - Standard 73 in    Weight Measured - Standard 236.4 lb    BSA 2.35 m2    Body Mass Index 31.19 kg/m2      General:  Alert and  oriented, No acute distress.    Eye:  Pupils are equal, round and reactive to light, Normal conjunctiva.    HENT:  Oral mucosa is moist.    Neck:  Supple.    Respiratory:  Respirations are non-labored.    Cardiovascular:  Normal rate, Regular rhythm, No edema.    Gastrointestinal:  Non-distended.    Musculoskeletal:  Normal gait.    Integumentary:  Warm, No rash.    Psychiatric:  Cooperative, Appropriate mood & affect, Normal judgment.       Review / Management   Results review      Impression and Plan   Diagnosis     Obesity (FSV48-KA E66.9).     Asthma (FRV96-WK J45.909).     Annual physical exam (VGZ22-ZL Z00.00).     Plan:  Reviewed exercise and diet.    Discussed weight and weight loss;  Reviewed health maintenance and encouraged completion;  Discussed Asthma medications and their benefits. Refilled Ventolin.  Will return for lipid, psa, glucose and allergy panels . Patient will be contacted with results. Ordered colonoscopy  Follow up in 1 year for well adult exam and as needed concerns and problems.  Questions were answered regarding health, medication management and future expectations. .    Orders     Orders (Selected)   Prescriptions  Prescribed  Pulmicort Flexhaler 90 mcg/inh inhalation powder: 1 puff(s), inh, bid, # 1 EA, 3 Refill(s), Type: Maintenance, Pharmacy: [facility] [id number], 1 puff(s) inh bid  Ventolin HFA 90 mcg/inh inhalation aerosol: 2 puff(s), inh, q4-6 hrs, # 1 EA, 11 Refill(s), Type: Soft Stop, Pharmacy: [facility] [id number], 2 puff(s) inh q4-6 hrs.       Cristiane JEFFREY [facility], acted solely as a scribe for, and in the presence of Dr. Seth Mckeon who performed the service.

## 2022-02-16 NOTE — NURSING NOTE
Comprehensive Intake Entered On:  3/15/2019 12:47 PM CDT    Performed On:  3/15/2019 12:45 PM CDT by Khushbu Du               Summary   Chief Complaint :   Px   Weight Measured :   244.2 lb(Converted to: 244 lb 3 oz, 110.77 kg)    Height Measured :   73 in(Converted to: 6 ft 1 in, 185.42 cm)    Body Mass Index :   32.21 kg/m2 (HI)    Body Surface Area :   2.39 m2   Systolic Blood Pressure :   156 mmHg (HI)    Diastolic Blood Pressure :   90 mmHg (HI)    Mean Arterial Pressure :   112 mmHg   Peripheral Pulse Rate :   51 bpm (LOW)    BP Method :   Electronic   HR Method :   Electronic   Temperature Tympanic :   97.1 DegF(Converted to: 36.2 DegC)  (LOW)    Khushbu Du - 3/15/2019 12:45 PM CDT   Health Status   Allergies Verified? :   Yes   Medication History Verified? :   Yes   Pre-Visit Planning Status :   Not completed   Tobacco Use? :   Never smoker   Khushbu Du - 3/15/2019 12:45 PM CDT

## 2022-02-16 NOTE — RESULTS
-------------------------------- Original Report -------------------------------    EXAM:  MRI of the RIGHT HIP, without contrast     AGE:  64 years.  GENDER:  Male.  CLINICAL:  Hip and groin pain for approximately 2 years.  No reported surgical  history.  COMPARISONS:  None available.    TECHNICAL: MR sequences of the right hip:  Axials: PD FS   Axial oblique: PD   Coronals: PD, T2  Coronal pelvis: T1 and STIR   Sagittals: PD and T2     SEDATION: None.  CONTRAST: None.  _____________________________________________________  FINDINGS:      Hip joint:  Physiologic volume of joint fluid. Approximately 26 mm intra-articular body or  severe marginal spurring of the inferomedial femoral head best seen on coronal  STIR series 9 image #21. Grade 3-4 chondral loss involving the peripheral  superior hip joint as seen on coronal STIR series 9 images number 20-26 with  grade 3 chondral loss also seen involving the posterior superior hip joint as  seen on coronal STIR series 9 image #19-23.    Labrum:  Degenerative tearing is seen throughout the entire labrum.  No significant  perilabral cyst formation is identified.    Proximal femur:    No fracture or osteonecrosis.  There is decreased femoral head-neck offset of the anterior femoral head-neck  junction consistent with femoral cam morphology (oblique axial series 4 image  #13-20).  Maximum femoral alpha angle measures approximately 87 degrees as seen  on axial PD oblique series 4 image #14.  Intra-articular body or severe marginal  spurring involving the inferomedial femoral head with moderate peripheral  marginal spurring involving the peripheral femoral head neck junction and about  the fovea.  Mild degenerative subchondral marrow edema/cystic change involving  the posterior femoral head.      Acetabulum:    Moderate peripheral spurring with mild to moderate subchondral cyst formation  involving the peripheral acetabular roof.  Version:  Decreased anteversion of the  superior acetabulum without significant  retroversion.  Coverage:  Right lateral center edge (CE) angle measures approximately 45   (normal 25 -39 ), midline coronal series 2 image #14.  Ligamentum teres:  Intact and unremarkable.    Pelvis osseous structures:    No suspicious marrow signal alteration or fracture line. Mild degenerative  changes are noted involving the inferior right sacroiliac joint. There are mild  degenerative changes at the pubic symphysis.    Myotendinous structures:  Gluteus abductors:  No convincing insertional tendinopathy or tear of gluteus  minimus or medius.  Adductors:  No demonstrable tendinopathy or strain/tear.  Pre-pubic aponeurotic complex:  Intact, without evidence of common rectus  abdominis-adductor longus aponeurosis or pubic plate lesion.  Hamstrings:  There is mild tendinosis of the proximal hamstring tendons  bilaterally without significant tendon tear.  Flexors:  Intact iliopsoas and rectus femoris, without strain/tear.  External rotators:  Intact. The ischiofemoral and quadratus femoris spaces are  within normal limits.    Bursae:    No demonstrable trochanteric, iliopsoas, or iliopectineal bursitis.    Intrapelvic structures:    Although evaluation of the intrapelvic structures is limited on this exam, no  convincing pelvic mass is identified as visualized.    Multilevel disc desiccation/degenerative changes are noted involving the imaged  lower lumbar spine.    Chondral loss is also seen involving the peripheral superior left hip joint with  degenerative cystic changes involving the peripheral acetabular roof.  _____________________________________________________  IMPRESSION:      1.  Severe inferomedial femoral head marginal spurring extending into the  inferior hip joint versus an underlying intra-articular body.  Otherwise  moderate osseous changes of right hip arthrosis.  Grade 3-4 chondral loss  involving the peripheral superior right hip joint with grade 3 chondral  loss  also seen involving the posterior superior hip joint.  Degenerative tearing  throughout the entire acetabular labrum.  2.  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  3.  Acetabular over-coverage which can contribute to pincer type femoral  acetabular impingement.  4.  Mild degenerative changes involving the pubic symphysis and inferior right  sacroiliac joint.  5.  Mild tendinosis of the proximal hamstring tendons bilaterally without  evidence of myotendinous injury.    JCZ      Read by: Orlando Garcia D.O.  Reviewed and Electronically Signed by: Orlando Garcia D.O.

## 2022-02-16 NOTE — NURSING NOTE
Pt contacted re: colonoscopy and prefers to have done at [facility].  Pt informed that order will be faxed to [facility] and they will contact him to set up DOS.  Copies of order, pt demographic info, insurance card and last OV note faxed to [facility].

## 2022-02-16 NOTE — NURSING NOTE
Quick Intake Entered On:  9/30/2019 3:07 PM CDT    Performed On:  9/30/2019 2:50 PM CDT by Kylie CASTLE, Shiloh PENA               Summary   Height Measured :   73 in(Converted to: 6 ft 1 in, 185.42 cm)    Systolic Blood Pressure :   106 mmHg   Diastolic Blood Pressure :   68 mmHg   Mean Arterial Pressure :   81 mmHg   BP Site :   Right arm   BP Method :   Manual   Shiloh Espinal RN - 9/30/2019 3:06 PM CDT

## 2022-02-16 NOTE — NURSING NOTE
Comprehensive Intake Entered On:  4/5/2021 10:46 AM CDT    Performed On:  4/5/2021 10:37 AM CDT by Geovanni Burgess CMA               Summary   Chief Complaint :   Pt here for FU on Right ear pain and from 4/2 and 4/4 urgent care telephone visit for RIght jaw pain.  Pt states ear and jaw pain.   Weight Measured :   244 lb(Converted to: 244 lb 0 oz, 110.677 kg)    Height Measured :   73 in(Converted to: 6 ft 1 in, 185.42 cm)    Body Mass Index :   32.19 kg/m2 (HI)    Body Surface Area :   2.39 m2   Systolic Blood Pressure :   126 mmHg   Diastolic Blood Pressure :   76 mmHg   Mean Arterial Pressure :   93 mmHg   Peripheral Pulse Rate :   60 bpm   Pulse Site :   Radial artery   Temperature Tympanic :   98.4 DegF(Converted to: 36.9 DegC)    Respiratory Rate :   16 br/min   Geovanni Burgess CMA - 4/5/2021 10:37 AM CDT   Health Status   Allergies Verified? :   Yes   Medication History Verified? :   Yes   Medical History Verified? :   Yes   Pre-Visit Planning Status :   Not completed   Tobacco Use? :   Former smoker   Geovanni Burgess CMA - 4/5/2021 10:37 AM CDT   Meds / Allergies   (As Of: 4/5/2021 10:46:20 AM CDT)   Allergies (Active)   No Known Medication Allergies  Estimated Onset Date:   Unspecified ; Created By:   Demi Lujan CMA; Reaction Status:   Active ; Category:   Drug ; Substance:   No Known Medication Allergies ; Type:   Allergy ; Updated By:   Demi Lujan CMA; Reviewed Date:   4/5/2021 10:43 AM CDT        Medication List   (As Of: 4/5/2021 10:46:20 AM CDT)   Prescription/Discharge Order    losartan  :   losartan ; Status:   Prescribed ; Ordered As Mnemonic:   losartan 50 mg oral tablet ; Simple Display Line:   50 mg, 1 tab(s), Oral, daily, 90 tab(s), 1 Refill(s) ; Ordering Provider:   Quinn Al MD; Catalog Code:   losartan ; Order Dt/Tm:   4/2/2021 11:30:24 AM CDT          atorvastatin  :   atorvastatin ; Status:   Prescribed ; Ordered As Mnemonic:   atorvastatin 80 mg oral tablet ; Simple Display  Line:   80 mg, 1 tab(s), Oral, daily, 90 tab(s), 1 Refill(s) ; Ordering Provider:   Quinn Al MD; Catalog Code:   atorvastatin ; Order Dt/Tm:   4/2/2021 11:30:25 AM CDT          fluticasone-salmeterol  :   fluticasone-salmeterol ; Status:   Prescribed ; Ordered As Mnemonic:   Advair Diskus 250 mcg-50 mcg inhalation powder ; Simple Display Line:   1 puff(s), Inhale, bid, 180 EA, 1 Refill(s) ; Ordering Provider:   Quinn Al MD; Catalog Code:   fluticasone-salmeterol ; Order Dt/Tm:   4/2/2021 11:29:43 AM CDT          albuterol  :   albuterol ; Status:   Prescribed ; Ordered As Mnemonic:   Ventolin HFA 90 mcg/inh inhalation aerosol ; Simple Display Line:   2 puff(s), Inhale, q4-6 hrs, 3 EA, 1 Refill(s) ; Ordering Provider:   Quinn Al MD; Catalog Code:   albuterol ; Order Dt/Tm:   4/2/2021 11:29:44 AM CDT          amLODIPine  :   amLODIPine ; Status:   Prescribed ; Ordered As Mnemonic:   amLODIPine 5 mg oral tablet ; Simple Display Line:   5 mg, 1 tab(s), Oral, daily, 90 tab(s), 1 Refill(s) ; Ordering Provider:   Quinn Al MD; Catalog Code:   amLODIPine ; Order Dt/Tm:   4/2/2021 10:58:11 AM CDT            Home Meds    lisinopril  :   lisinopril ; Status:   Processing ; Ordered As Mnemonic:   lisinopril 10 mg oral tablet ; Action Display:   Complete ; Catalog Code:   lisinopril ; Order Dt/Tm:   4/5/2021 10:42:49 AM CDT          naproxen  :   naproxen ; Status:   Documented ; Ordered As Mnemonic:   naproxen sodium 220 mg oral tablet ; Simple Display Line:   220 mg, 1 tab(s), Oral, q8 hrs, PRN: for pain, 30 tab(s), 0 Refill(s) ; Catalog Code:   naproxen ; Order Dt/Tm:   4/5/2021 10:37:16 AM CDT          ibuprofen  :   ibuprofen ; Status:   Documented ; Ordered As Mnemonic:   ibuprofen 200 mg oral tablet ; Simple Display Line:   400 mg, 2 tab(s), Oral, q4 hrs, PRN: for pain, 120 tab(s), 0 Refill(s) ; Catalog Code:   ibuprofen ; Order Dt/Tm:   4/5/2021 10:37:02 AM CDT           acetaminophen  :   acetaminophen ; Status:   Documented ; Ordered As Mnemonic:   acetaminophen 325 mg oral tablet ; Simple Display Line:   650 mg, 2 tab(s), Oral, q4 hrs, 0 Refill(s) ; Catalog Code:   acetaminophen ; Order Dt/Tm:   4/5/2021 10:36:14 AM CDT          nitroglycerin  :   nitroglycerin ; Status:   Documented ; Ordered As Mnemonic:   nitroglycerin 0.4 mg sublingual tablet ; Simple Display Line:   0 Refill(s) ; Catalog Code:   nitroglycerin ; Order Dt/Tm:   4/5/2021 10:35:22 AM CDT ; Comment:   Responsible Provider: JOSEE HOLT          aspirin  :   aspirin ; Status:   Documented ; Ordered As Mnemonic:   aspirin 81 mg oral tablet, chewable ; Simple Display Line:   81 mg, 1 tab(s), PO, Daily, 30 tab(s), 0 Refill(s) ; Catalog Code:   aspirin ; Order Dt/Tm:   3/19/2019 1:54:21 PM CDT            ID Risk Screen   Recent Travel History :   No recent travel   Family Member Travel History :   No recent travel   Other Exposure to Infectious Disease :   Unknown   COVID-19 Testing Status :   No COVID-19 test performed   Geovanni Burgess CMA 4/5/2021 10:37 AM CDT   Social History   Social History   (As Of: 4/5/2021 10:46:21 AM CDT)   Alcohol:        Current, Beer (12 oz), Wine (5 oz), 3-5 times per week, 1 drinks/episode average.  2 drinks/episode maximum.   (Last Updated: 3/5/2015 12:34:58 PM CST by Luisa Null CMA)          Tobacco:        Former smoker, quit more than 30 days ago, Cigars   (Last Updated: 3/5/2021 11:26:49 AM CST by Khushbu Du)          Electronic Cigarette/Vaping:        Electronic Cigarette Use: Never.   (Last Updated: 3/5/2021 11:26:53 AM CST by Khushbu Du)          Substance Abuse:        Past   (Last Updated: 3/5/2015 12:35:17 PM CST by Luisa Null CMA)          Employment/School:        Employed, Work/School description: [employer]   (Last Updated: 3/5/2015 12:35:30 PM CST by Luisa Null CMA)          Home/Environment:        Spouse/Partner name: Cielo Polk.    (Last Updated: 3/5/2015 12:36:01 PM CST by Luisa Null CMA)          Nutrition/Health:        Type of diet: Regular.   (Last Updated: 3/5/2015 12:36:10 PM CST by Luisa Null CMA)          Exercise:        Exercise frequency: 5-6 times/week.  Exercise type: Walking, Weight lifting.   (Last Updated: 3/5/2015 12:36:45 PM CST by Luisa Null CMA)          Sexual:        Sexually active: Yes.  Sexual orientation: Heterosexual.   (Last Updated: 3/5/2015 12:37:02 PM CST by Luisa Null CMA)

## 2022-02-16 NOTE — NURSING NOTE
Comprehensive Intake Entered On:  3/11/2020 9:01 AM CDT    Performed On:  3/11/2020 8:57 AM CDT by Khushbu Du               Summary   Chief Complaint :   IVD check up.    Weight Measured :   225.6 lb(Converted to: 225 lb 10 oz, 102.33 kg)    Height Measured :   73 in(Converted to: 6 ft 1 in, 185.42 cm)    Body Mass Index :   29.76 kg/m2 (HI)    Body Surface Area :   2.29 m2   Systolic Blood Pressure :   135 mmHg (HI)    Diastolic Blood Pressure :   76 mmHg   Mean Arterial Pressure :   96 mmHg   Peripheral Pulse Rate :   81 bpm   BP Method :   Electronic   HR Method :   Electronic   Temperature Tympanic :   98.2 DegF(Converted to: 36.8 DegC)    Khushbu Du - 3/11/2020 8:57 AM CDT   Health Status   Allergies Verified? :   Yes   Medication History Verified? :   Yes   Pre-Visit Planning Status :   Completed   Tobacco Use? :   Never smoker   Khushbu Du - 3/11/2020 8:57 AM CDT   Meds / Allergies   (As Of: 3/11/2020 9:01:11 AM CDT)   Allergies (Active)   No Known Medication Allergies  Estimated Onset Date:   Unspecified ; Created By:   Demi Lujan CMA; Reaction Status:   Active ; Category:   Drug ; Substance:   No Known Medication Allergies ; Type:   Allergy ; Updated By:   Demi Lujan CMA; Reviewed Date:   12/20/2019 10:16 AM CST        Medication List   (As Of: 3/11/2020 9:01:11 AM CDT)   Prescription/Discharge Order    albuterol  :   albuterol ; Status:   Prescribed ; Ordered As Mnemonic:   Ventolin HFA 90 mcg/inh inhalation aerosol ; Simple Display Line:   2 puff(s), Inhale, q4-6 hrs, 3 EA, 3 Refill(s) ; Ordering Provider:   Quinn Al MD; Catalog Code:   albuterol ; Order Dt/Tm:   8/1/2019 8:30:54 AM CDT            Home Meds    clopidogrel  :   clopidogrel ; Status:   Documented ; Ordered As Mnemonic:   clopidogrel 75 mg oral tablet ; Simple Display Line:   75 mg, 1 tab(s), Oral, daily, Rx'd by JOSEE HOLT, 0 Refill(s) ; Catalog Code:   clopidogrel ; Order Dt/Tm:   11/21/2019  10:14:31 AM CST          atorvastatin  :   atorvastatin ; Status:   Documented ; Ordered As Mnemonic:   atorvastatin 80 mg oral tablet ; Simple Display Line:   80 mg, 1 tab(s), Oral, daily, Rx'd by JOSEE HOLT, 0 Refill(s) ; Catalog Code:   atorvastatin ; Order Dt/Tm:   9/3/2019 11:19:36 AM CDT          lisinopril  :   lisinopril ; Status:   Documented ; Ordered As Mnemonic:   lisinopril 10 mg oral tablet ; Simple Display Line:   10 mg, 1 tab(s), Oral, daily, Rx'd by JOSEE HOLT, 0 Refill(s) ; Catalog Code:   lisinopril ; Order Dt/Tm:   3/19/2019 1:57:49 PM CDT          aspirin  :   aspirin ; Status:   Documented ; Ordered As Mnemonic:   aspirin 81 mg oral tablet, chewable ; Simple Display Line:   81 mg, 1 tab(s), PO, Daily, 30 tab(s), 0 Refill(s) ; Catalog Code:   aspirin ; Order Dt/Tm:   3/19/2019 1:54:21 PM CDT

## 2022-02-16 NOTE — LETTER
(Inserted Image. Unable to display)   November 22, 2019      TAMAR BRENT       [place], [place] [id number]        Dear TAMAR,    Thank you for selecting [facility] for your healthcare needs.  Below you will find the results of the recent tests done at our clinic.     All labs acceptable.      Result Name Current Result Previous Result Reference Range   Potassium Level (mmol/L)  4.5 11/20/2019  4.4 3/15/2019 3.5 - 5.3   Glucose Level (mg/dL)  86 11/20/2019  96 3/15/2019 65 - 99   Creatinine Level (mg/dL)  1.10 11/20/2019  0.92 3/15/2019 0.70 - 1.25   Hgb (gm/dL)  14.3 11/20/2019  14.9 3/15/2019 13.2 - 17.1   Lyme Ab IgG/IgM WB  <0.90 11/20/2019         Please contact me or my assistant at [phone] if you have any questions.     Sincerely,        Quinn Al MD        What do your labs mean?  Below is a glossary of commonly ordered labs:  LDL   Bad Cholesterol   HDL   Good Cholesterol  AST/ALT   Liver Function   Cr/Creatinine   Kidney Function  Microalbumin   Kidney Function  BUN   Kidney Function  PSA   Prostate    TSH   Thyroid Hormone  HgbA1c   Diabetes Test   Hgb (Hemoglobin)   Red Blood Cells

## 2022-02-16 NOTE — PROGRESS NOTES
Patient:   TAMAR KENNEDY            MRN: [id number]            FIN: [id number]               Age:   63 years     Sex:  Male     :  1955   Associated Diagnoses:   None   Author:   Quinn Al MD      Procedure   EKG procedure   Indication: chest pain.     Position: supine.     EKG findings   Interpretation: by primary care provider.     Rhythm: sinus bradycardia.     Axis: left axis deviation.     Intervals: WA normal, QRS normal, QT normal.     P waves: normal.     QRS complex: normal, no Q waves present.     Interpretation: Abnormal, no ST-T wave abnormalities.     Discussed: with patient.        Impression and Plan   Orders

## 2022-03-02 VITALS
SYSTOLIC BLOOD PRESSURE: 132 MMHG | HEART RATE: 70 BPM | BODY MASS INDEX: 32.2 KG/M2 | DIASTOLIC BLOOD PRESSURE: 78 MMHG | HEIGHT: 73 IN | TEMPERATURE: 98.4 F | WEIGHT: 243 LBS

## 2022-03-02 NOTE — LETTER
(Inserted Image. Unable to display)   [address] [place] [address]  February 07, 2022      TAMAR KENNEDY   [facility] [place] [address]        Dear TAMAR,      Thank you for selecting [facility] for your healthcare needs.     You had a colonoscopy done for colon cancer screening on Tuesday February 1st, 2022. one precancerous polyp (tubular adenoma) was removed. I recommend a follow up colonoscopy in the next 1-2 months with the Gastroenterologist to deal with the other larger polyp in the rectum.            Please contact me or my assistant at [phone] if you have any questions or concerns.     Sincerely,        Anup Clement MD

## 2022-03-02 NOTE — TELEPHONE ENCOUNTER
---------------------  From: Hilda Bell RN (Phone Messages Pool ([id number]_[facility]))   To: TFS Message Pool ([address] [place] [address]);     Sent: 1/18/2022 10:41:31 AM CST  Subject: colonoscopy       PCP:  SHARON      Time of Call:  10:37am       Person Calling:  pt  Phone number:  [phone]    Note:   VM received from pt, requesting assistance with getting an order for colonoscopy and getting this scheduled.    Please advise.     Last office visit and reason:  9/21/21 IVD/asthma TFS---------------------  From: Luz Elena Wise CMA (TFS Message Pool ([id number]))   To: Referral Coordinators Pool ([address] [place] [address]);     Sent: 1/18/2022 11:52:40 AM CST  Subject: FW: colonoscopy     Updated order given to TC to fax to [place].ORDER FAXED

## 2022-03-02 NOTE — LETTER
(Inserted Image. Unable to display)   [address] [place] [address]  January 31, 2022        TAMAR REYNOLDSMIKISUSANNAH       [place] [address]        Dear TAMAR,    Thank you for choosing [facility] for your healthcare needs. Below you will find the results of your recent test(s) done at our clinic.      these labs are looking good      Result Name Current Result Previous Result Reference Range   Glucose Level (mg/dL)  87 1/27/2022 ((H)) 111 4/2/2021 65 - 99   BUN (mg/dL)  17 1/27/2022  16 4/2/2021 7 - 25   Creatinine Level (mg/dL)  0.93 1/27/2022  0.80 4/2/2021 0.70 - 1.25   eGFR (mL/min/1.73m2)  86 1/27/2022  95 4/2/2021 > OR = 60 -    eGFR  (mL/min/1.73m2)  100 1/27/2022  110 4/2/2021 > OR = 60 -    Sodium Level (mmol/L)  139 1/27/2022  138 4/2/2021 135 - 146   Potassium Level (mmol/L)  4.4 1/27/2022  4.4 4/2/2021 3.5 - 5.3   Chloride Level (mmol/L)  104 1/27/2022  104 4/2/2021 98 - 110   CO2 Level (mmol/L)  27 1/27/2022  26 4/2/2021 20 - 32   Calcium Level (mg/dL)  9.4 1/27/2022  9.4 4/2/2021 8.6 - 10.3   WBC  8.3 1/27/2022  3.8 - 10.8   RBC  4.76 1/27/2022  4.20 - 5.80   Hgb (gm/dL)  14.7 1/27/2022  13.2 - 17.1   Hct (%)  43.1 1/27/2022  38.5 - 50.0   MCV (fL)  90.5 1/27/2022  80.0 - 100.0   MCH (pg)  30.9 1/27/2022  27.0 - 33.0   MCHC (gm/dL)  34.1 1/27/2022  32.0 - 36.0   RDW (%)  12.0 1/27/2022  11.0 - 15.0   Platelet  257 1/27/2022  140 - 400   MPV (fL)  10.3 1/27/2022  7.5 - 12.5       Please contact me or my assistant at [phone] if you have any questions or concerns.     Sincerely,        Seth Mckeon MD        What do your labs mean?  Below is a glossary of commonly ordered labs:  LDL   Bad Cholesterol   HDL   Good Cholesterol  AST/ALT   Liver Function   Cr/Creatinine   Kidney Function  Microalbumin   Kidney Function  BUN   Kidney Function  PSA   Prostate    TSH   Thyroid Hormone  HgbA1c   Diabetes Test   Hgb (Hemoglobin)   Red  Blood Cells

## 2022-03-02 NOTE — NURSING NOTE
Comprehensive Intake Entered On:  1/27/2022 1:25 PM CST    Performed On:  1/27/2022 1:17 PM CST by Gwendolyn Hunt LPN               Summary   Chief Complaint :   pre-op for colonscopy 02/01 at [facility] with Dr Clement.    Weight Measured :   243 lb(Converted to: 243 lb 0 oz, 110.223 kg)    Height Measured :   73 in(Converted to: 6 ft 1 in, 185.42 cm)    Body Mass Index :   32.06 kg/m2 (HI)    Body Surface Area :   2.38 m2   Systolic Blood Pressure :   132 mmHg (HI)    Diastolic Blood Pressure :   78 mmHg   Mean Arterial Pressure :   96 mmHg   Peripheral Pulse Rate :   70 bpm   BP Site :   Right arm   Pulse Site :   Radial artery   BP Method :   Manual   HR Method :   Manual   Temperature Tympanic :   98.4 DegF(Converted to: 36.9 DegC)    Gwendolyn Hunt LPN - 1/27/2022 1:17 PM CST   Health Status   Allergies Verified? :   Yes   Medication History Verified? :   Yes   Tobacco Use? :   Former smoker   Gwendolyn Hunt LPN - 1/27/2022 1:17 PM CST   Consents   Consent for Immunization Exchange :   Consent Granted   Consent for Immunizations to Providers :   Consent Granted   Gwendolyn Hunt LPN - 1/27/2022 1:17 PM CST   Meds / Allergies   (As Of: 1/27/2022 1:25:19 PM CST)   Allergies (Active)   No Known Medication Allergies  Estimated Onset Date:   Unspecified ; Created By:   Demi Lujan CMA; Reaction Status:   Active ; Category:   Drug ; Substance:   No Known Medication Allergies ; Type:   Allergy ; Updated By:   Demi Lujan CMA; Reviewed Date:   9/21/2021 11:55 AM CDT        Medication List   (As Of: 1/27/2022 1:25:19 PM CST)   Prescription/Discharge Order    albuterol  :   albuterol ; Status:   Prescribed ; Ordered As Mnemonic:   Ventolin HFA 90 mcg/inh inhalation aerosol ; Simple Display Line:   2 puff(s), Inhale, q4-6 hrs, 3 EA, 1 Refill(s) ; Ordering Provider:   Quinn Al MD; Catalog Code:   albuterol ; Order Dt/Tm:   9/21/2021 11:47:44 AM CDT          amLODIPine  :   amLODIPine ; Status:    Prescribed ; Ordered As Mnemonic:   amLODIPine 5 mg oral tablet ; Simple Display Line:   5 mg, 1 tab(s), Oral, daily, 90 tab(s), 1 Refill(s) ; Ordering Provider:   Quinn Al MD; Catalog Code:   amLODIPine ; Order Dt/Tm:   9/21/2021 11:47:44 AM CDT          atorvastatin  :   atorvastatin ; Status:   Prescribed ; Ordered As Mnemonic:   atorvastatin 80 mg oral tablet ; Simple Display Line:   80 mg, 1 tab(s), Oral, daily, 90 tab(s), 1 Refill(s) ; Ordering Provider:   Quinn Al MD; Catalog Code:   atorvastatin ; Order Dt/Tm:   9/21/2021 11:47:46 AM CDT          fluticasone-salmeterol  :   fluticasone-salmeterol ; Status:   Prescribed ; Ordered As Mnemonic:   Advair Diskus 250 mcg-50 mcg inhalation powder ; Simple Display Line:   1 puff(s), Inhale, bid, 180 EA, 1 Refill(s) ; Ordering Provider:   Quinn Al MD; Catalog Code:   fluticasone-salmeterol ; Order Dt/Tm:   9/21/2021 11:47:45 AM CDT          indomethacin  :   indomethacin ; Status:   Prescribed ; Ordered As Mnemonic:   indomethacin 50 mg oral capsule ; Simple Display Line:   50 mg, 1 cap(s), Oral, tid, PRN: for gout pain, 30 cap(s), 0 Refill(s) ; Ordering Provider:   Fito Esquivel PA-C; Catalog Code:   indomethacin ; Order Dt/Tm:   5/21/2021 8:07:47 AM CDT          losartan  :   losartan ; Status:   Prescribed ; Ordered As Mnemonic:   losartan 50 mg oral tablet ; Simple Display Line:   50 mg, 1 tab(s), Oral, daily, 90 tab(s), 1 Refill(s) ; Ordering Provider:   Quinn Al MD; Catalog Code:   losartan ; Order Dt/Tm:   9/21/2021 11:47:45 AM CDT            Home Meds    acetaminophen  :   acetaminophen ; Status:   Documented ; Ordered As Mnemonic:   acetaminophen 325 mg oral tablet ; Simple Display Line:   650 mg, 2 tab(s), Oral, q4 hrs, 0 Refill(s) ; Catalog Code:   acetaminophen ; Order Dt/Tm:   4/5/2021 10:36:14 AM CDT          aspirin  :   aspirin ; Status:   Documented ; Ordered As Mnemonic:   aspirin 81 mg oral tablet, chewable  ; Simple Display Line:   81 mg, 1 tab(s), PO, Daily, 30 tab(s), 0 Refill(s) ; Catalog Code:   aspirin ; Order Dt/Tm:   3/19/2019 1:54:21 PM CDT          ibuprofen  :   ibuprofen ; Status:   Documented ; Ordered As Mnemonic:   ibuprofen 200 mg oral tablet ; Simple Display Line:   400 mg, 2 tab(s), Oral, q4 hrs, PRN: for pain, 120 tab(s), 0 Refill(s) ; Catalog Code:   ibuprofen ; Order Dt/Tm:   4/5/2021 10:37:02 AM CDT          naproxen  :   naproxen ; Status:   Documented ; Ordered As Mnemonic:   naproxen sodium 220 mg oral tablet ; Simple Display Line:   220 mg, 1 tab(s), Oral, q8 hrs, PRN: for pain, 30 tab(s), 0 Refill(s) ; Catalog Code:   naproxen ; Order Dt/Tm:   4/5/2021 10:37:16 AM CDT          nitroglycerin  :   nitroglycerin ; Status:   Documented ; Ordered As Mnemonic:   nitroglycerin 0.4 mg sublingual tablet ; Simple Display Line:   0 Refill(s) ; Catalog Code:   nitroglycerin ; Order Dt/Tm:   4/5/2021 10:35:22 AM CDT ; Comment:   Responsible Provider: JOSEE HOLT

## 2022-03-02 NOTE — PROGRESS NOTES
Patient:   TAMAR KENNEDY            MRN: [id number]            FIN: [id number]               Age:   66 years     Sex:  Male     :  1955   Associated Diagnoses:   Asthma; Coronary artery disease; HTN (hypertension); Preop examination   Author:   Seth Mckeon MD      Preoperative Information   Indication for surgery   Accompanied by   Source of history          Chief Complaint   2022 1:17 PM CST    pre-op for colonscopy  at [place] with Dr Clement.   had a positive cologuard      Review of Systems   Constitutional:  No fever, No chills.    Eye   Ear/Nose/Mouth/Throat:  No nasal congestion, No sore throat.    Respiratory:  No shortness of breath, No cough.    Cardiovascular:  No chest pain.    Breast   Gastrointestinal:  No nausea, No vomiting, No diarrhea, No constipation.    Genitourinary:  No dysuria.    Gynecologic   Hematology/Lymphatics:  No bruising tendency, No swollen lymph glands.    Endocrine   Immunologic:  No recurrent fevers, No recurrent infections.    Musculoskeletal:  No muscle pain.    Integumentary:  No rash.    Neurologic:  No tingling, No headache.    Psychiatric   All other systems.     Health Status   Allergies:    Allergic Reactions (Selected)  No Known Medication Allergies   Medications:  (Selected)   Prescriptions  Prescribed  Advair Diskus 250 mcg-50 mcg inhalation powder: 1 puff(s), Inhale, bid, # 180 EA, 1 Refill(s), Type: Maintenance, Pharmacy: [facility] #[id number], 1 puff(s) Inhale bid, 73, in, 21 7:56:00 CDT, Height Measured, 228, lb, 21 11:08:00 CDT, Weight Measured  Ventolin HFA 90 mcg/inh inhalation aerosol: 2 puff(s), Inhale, q4-6 hrs, # 3 EA, 1 Refill(s), Type: Soft Stop, Pharmacy: [facility] #[id number], 2 puff(s) Inhale q4-6 hrs, 73, in, 21 7:56:00 CDT, Height Measured, 228, lb, 21 11:08:00 CDT, Weight Measured  amLODIPine 5 mg oral tablet: = 1 tab(s) ( 5 mg ), Oral, daily, # 90 tab(s), 1 Refill(s), Type: Maintenance,  Pharmacy: [facility] #[id number], 1 tab(s) Oral daily, 73, in, 05/21/21 7:56:00 CDT, Height Measured, 228, lb, 09/21/21 11:08:00 CDT, Weight Measured  atorvastatin 80 mg oral tablet: = 1 tab(s) ( 80 mg ), Oral, daily, # 90 tab(s), 1 Refill(s), Type: Maintenance, Pharmacy: [facility] #[id number], 1 tab(s) Oral daily, 73, in, 05/21/21 7:56:00 CDT, Height Measured, 228, lb, 09/21/21 11:08:00 CDT, Weight Measured  indomethacin 50 mg oral capsule: = 1 cap(s) ( 50 mg ), Oral, tid, PRN: for gout pain, # 30 cap(s), 0 Refill(s), Type: Maintenance, Pharmacy: [facility] #[id number], 1 cap(s) Oral tid,PRN:for gout pain, 73, in, 05/21/21 7:56:00 CDT, Height Measured, 237.3, lb, 05/21/21 7:56:00 CD...  losartan 50 mg oral tablet: = 1 tab(s) ( 50 mg ), Oral, daily, # 90 tab(s), 1 Refill(s), Type: Maintenance, Pharmacy: [facility] #[id number], 1 tab(s) Oral daily, 73, in, 05/21/21 7:56:00 CDT, Height Measured, 228, lb, 09/21/21 11:08:00 CDT, Weight Measured  Documented Medications  Documented  acetaminophen 325 mg oral tablet: = 2 tab(s) ( 650 mg ), Oral, q4 hrs, 0 Refill(s), Type: Maintenance  aspirin 81 mg oral tablet, chewable: = 1 tab(s) ( 81 mg ), PO, Daily, # 30 tab(s), 0 Refill(s), Type: Maintenance  ibuprofen 200 mg oral tablet: = 2 tab(s) ( 400 mg ), Oral, q4 hrs, PRN: for pain, # 120 tab(s), 0 Refill(s), Type: Maintenance  naproxen sodium 220 mg oral tablet: = 1 tab(s) ( 220 mg ), Oral, q8 hrs, PRN: for pain, # 30 tab(s), 0 Refill(s), Type: Maintenance  nitroglycerin 0.4 mg sublingual tablet: 0 Refill(s), Type: Soft Stop,    Medications          *denotes recorded medication          *acetaminophen 325 mg oral tablet: 650 mg, 2 tab(s), Oral, q4 hrs, 0 Refill(s).          Ventolin HFA 90 mcg/inh inhalation aerosol: 2 puff(s), Inhale, q4-6 hrs, 3 EA, 1 Refill(s).          amLODIPine 5 mg oral tablet: 5 mg, 1 tab(s), Oral, daily, 90 tab(s), 1 Refill(s).          *aspirin 81 mg oral tablet, chewable: 81 mg,  1 tab(s), PO, Daily, 30 tab(s), 0 Refill(s).          atorvastatin 80 mg oral tablet: 80 mg, 1 tab(s), Oral, daily, 90 tab(s), 1 Refill(s).          Advair Diskus 250 mcg-50 mcg inhalation powder: 1 puff(s), Inhale, bid, 180 EA, 1 Refill(s).          *ibuprofen 200 mg oral tablet: 400 mg, 2 tab(s), Oral, q4 hrs, PRN: for pain, 120 tab(s), 0 Refill(s).          indomethacin 50 mg oral capsule: 50 mg, 1 cap(s), Oral, tid, PRN: for gout pain, 30 cap(s), 0 Refill(s).          losartan 50 mg oral tablet: 50 mg, 1 tab(s), Oral, daily, 90 tab(s), 1 Refill(s).          *naproxen sodium 220 mg oral tablet: 220 mg, 1 tab(s), Oral, q8 hrs, PRN: for pain, 30 tab(s), 0 Refill(s).          *nitroglycerin 0.4 mg sublingual tablet: 0 Refill(s).       Problem list:    All Problems  Asthma / [id number] / Confirmed  Obesity / 5051521551 / Probable  Elevated lipids / 363883589 / Confirmed  Family history of heart disease in male family member before age 55 / 107419062 / Confirmed  Osteoarthritis of right hip / [id number] / Confirmed  Allergic rhinitis / 819418641 / Confirmed  Coronary artery disease / 85316507 / Confirmed  HTN (hypertension) / 7006065859 / Confirmed  History of non-ST elevation myocardial infarction (NSTEMI) / 4435323835 / Confirmed  Resolved: Inpatient stay / [id number]  @[place], [place] - NSTEMI  Canceled: Asthma / [id number]      Histories   Past Medical History:    Active  History of non-ST elevation myocardial infarction (NSTEMI) ([id number]): Onset on 3/15/2019 at 63 years.  Resolved  Inpatient stay ([id number]): Onset on 3/15/2019 at 63 years.  Resolved on 3/18/2019 at 63 years.  Comments:  3/27/2019 CDT 7:54 AM CDT - Shirlene Rodrigues  @[place], [place] - NSTEMI   Family History:    MI - Myocardial infarction  Father (Unknown, )     Procedure history:    Arthroscopy of hip (SNOMED CT 513063460) performed by Christopher Abraham MD on  12/16/2019 at 64 Years.  Comments:  12/18/2019 1:32 PM CST - Janel Momin  Total right.  Endovascular insertion of drug eluting stent (SNOMED CT 8451898385) on 3/18/2019 at 63 Years.  Comments:  3/27/2019 7:58 AM CDT - Ravi Gardeniari  x1 to LAD   Social History:        Electronic Cigarette/Vaping Assessment            Electronic Cigarette Use: Never.      Alcohol Assessment            Current, Beer (12 oz), Wine (5 oz), 3-5 times per week, 1 drinks/episode average.  2 drinks/episode maximum.      Tobacco Assessment            Former smoker, quit more than 30 days ago, Cigars      Substance Abuse Assessment            Past      Employment and Education Assessment            Employed, Work/School description: .      Home and Environment Assessment            Spouse/Partner name: Cielo Polk.      Nutrition and Health Assessment            Type of diet: Regular.      Exercise and Physical Activity Assessment            Exercise frequency: 5-6 times/week.  Exercise type: Walking, Weight lifting.      Sexual Assessment            Sexually active: Yes.  Sexual orientation: Heterosexual.       Has no history of anemia.  Has  no history of DVT or pulmonary embolism.  Has no personal history of bleeding problems.   Has no personal or family history of anesthesia reactions.  Patient  does not have active tuberculosis.    S/he has taken aspirin or aspirin containing products in the last week.   will hold  S/he  has not taken Plavix (Clopidogrel) in the last 2 weeks.    S/he has not taken warfarin in the past week.    S/he has not been on corticosteroids for more than 2 weeks recently.      S/he is not DNR before, during or after surgery.    Chest pain / SOB walking up 2 flights of steps? no  Pain in neck or jaw? no  CAD MI?  had MI 2 years ago and stent placed into mid LAD, intact EF  Afib? no  Heart Failure? no  Asthma  or Bronchitis? yes with occasional albuterol use  Diabetes? no       Insulin/Orals?   Seizure  Disorder? no  CKD? no  Thyroid Disease? no  Liver Disease no  CVA?  no         Physical Examination   Vital Signs   1/27/2022 1:17 PM CST Temperature Tympanic 98.4 DegF    Peripheral Pulse Rate 70 bpm    Pulse Site Radial artery    HR Method Manual    Systolic Blood Pressure 132 mmHg  HI    Diastolic Blood Pressure 78 mmHg    Mean Arterial Pressure 96 mmHg    BP Site Right arm    BP Method Manual      Measurements from flowsheet : Measurements   1/27/2022 1:17 PM CST Height Measured - Standard 73 in    Weight Measured - Standard 243 lb    BSA 2.38 m2    Body Mass Index 32.06 kg/m2  HI      General:  Alert and oriented, No acute distress.    Eye:  Pupils are equal, round and reactive to light, Normal conjunctiva.    HENT:  Oral mucosa is moist.    Neck:  Supple.    Respiratory:  Lungs are clear to auscultation, Respirations are non-labored.    Cardiovascular:  Normal rate, Regular rhythm, No edema.    Gastrointestinal:  Non-tender, Non-distended.    Musculoskeletal:  Normal gait.    Integumentary:  Warm, No rash.    Psychiatric:  Cooperative, Appropriate mood & affect, Normal judgment.       Review / Management   Results review:  Lab results   1/27/2022 2:00 PM CST Sodium Level 139 mmol/L    Potassium Level 4.4 mmol/L    Chloride Level 104 mmol/L    CO2 Level 27 mmol/L    Glucose Level 87 mg/dL    BUN 17 mg/dL    Creatinine Level 0.93 mg/dL    BUN/Creat Ratio NOT APPLICABLE    eGFR 86 mL/min/1.73m2    eGFR African American 100 mL/min/1.73m2    Calcium Level 9.4 mg/dL    WBC 8.3    RBC 4.76    Hgb 14.7 gm/dL    Hct 43.1 %    MCV 90.5 fL    MCH 30.9 pg    MCHC 34.1 gm/dL    RDW 12.0 %    Platelet 257    MPV 10.3 fL   .       Impression and Plan   Diagnosis     Asthma (RLQ50-OM J45.909).     Coronary artery disease (HXL99-FX I25.10).     HTN (hypertension) (TIV71-FV I10).     Preop examination (NPA33-VF Z01.818).     Orders     had stent for CAD 2 years ago but vigourous and active without functional limitation  presently  no concerns with proceeding with colonoscopy.

## 2022-03-02 NOTE — RESULTS
Patient:   TAMAR KENNEDY            MRN: [id number]            FIN: [id number]               Age:   66 years     Sex:  Male     :  1955   Associated Diagnoses:   None   Author:   Seth Mckeon MD      Procedure   EKG procedure   Date/ Time:  2022 1:57:00 PM.     EKG findings   Interpretation: Seth Mckeon MD.     Rhythm: heart rate  75  beats/min.     ST-T-U complex: normal.     Interpretation: normal EKG.

## 2022-03-02 NOTE — TELEPHONE ENCOUNTER
ORDER, DEMOGRAPHICS, AND INSURANCE FAXED TO [facility] @ : [phone]  F: [phone]. THEY WILL CONTACT PT TO SCHEDULE.

## 2022-03-02 NOTE — TELEPHONE ENCOUNTER
---------------------  From: Millie Meza   To: Linda RODRIGUEZ, Seth;     Sent: 2/8/2022 3:09:41 PM CST  Subject: Hearing Evaluation Results     Dr. Mckeon,    I had the pleasure of seeing your patient for a hearing evaluation and his detailed results are listed below.    Hx: Patient reports hearing concerns. He feels he does well with communication but at times he struggles understanding his wife at home. 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 Experiences intermittent bilateral tinnitus and pressure, which is relieved with the valsalva maneuver, so likely eustachian tube dysfunction. Denied pain, drainage, dizziness and family history of hearing loss.    Results: Otoscopy: clear canals bilaterally. Normal hearing from 250-4000 Hz sloping to a mild sensorineural hearing loss (SNHL) bilaterally at 2465-0324 Hz.  Word Recognition Score: 100% left, 100% right    Rec: Hearing protection when around loud sounds. F/u with provider concerning pressure concerns. Annual hearing test or sooner if concerns arise.    Thank you for your referral and please let me know if any questions arise,    Karrie Mehta, CCC-A

## 2022-03-02 NOTE — TELEPHONE ENCOUNTER
---------------------  From: Kylie CASTLE, Shiloh PENA (Phone Messages Pool ([id number]_[facility]))   Sent: 1/27/2022 9:30:09 AM CST  Subject: Pre op question     Phone message    PCP:   _      Time of Call:  0928       Person Calling:  Pt  Phone number:  _    Returned call at: _    Note:   Pt called asking what meds he should not take before his Colonoscopy on Tuesday. I asked if he had a pre op px and he said not. I advised he should likely have one done so labs can be performed and a provider can advise on what meds to take or not. Pt agreed. Transferred to scheduling.    Last office visit and reason:  _

## 2022-03-15 ENCOUNTER — OFFICE VISIT (OUTPATIENT)
Dept: FAMILY MEDICINE | Facility: CLINIC | Age: 67
End: 2022-03-15
Payer: COMMERCIAL

## 2022-03-15 VITALS
BODY MASS INDEX: 32.06 KG/M2 | HEART RATE: 59 BPM | WEIGHT: 243 LBS | SYSTOLIC BLOOD PRESSURE: 131 MMHG | DIASTOLIC BLOOD PRESSURE: 78 MMHG

## 2022-03-15 DIAGNOSIS — H69.93 DYSFUNCTION OF BOTH EUSTACHIAN TUBES: ICD-10-CM

## 2022-03-15 DIAGNOSIS — R35.0 URINARY FREQUENCY: ICD-10-CM

## 2022-03-15 DIAGNOSIS — Z12.5 SCREENING FOR PROSTATE CANCER: ICD-10-CM

## 2022-03-15 DIAGNOSIS — H69.93 DYSFUNCTION OF BOTH EUSTACHIAN TUBES: Primary | ICD-10-CM

## 2022-03-15 LAB
ALBUMIN UR-MCNC: NEGATIVE MG/DL
APPEARANCE UR: CLEAR
BILIRUB UR QL STRIP: NEGATIVE
COLOR UR AUTO: YELLOW
GLUCOSE UR STRIP-MCNC: NEGATIVE MG/DL
HGB UR QL STRIP: NEGATIVE
KETONES UR STRIP-MCNC: NEGATIVE MG/DL
LEUKOCYTE ESTERASE UR QL STRIP: NEGATIVE
NITRATE UR QL: NEGATIVE
PH UR STRIP: 5 [PH] (ref 5–7)
SP GR UR STRIP: 1.02 (ref 1–1.03)
UROBILINOGEN UR STRIP-ACNC: 0.2 E.U./DL

## 2022-03-15 PROCEDURE — 36415 COLL VENOUS BLD VENIPUNCTURE: CPT | Performed by: FAMILY MEDICINE

## 2022-03-15 PROCEDURE — 81003 URINALYSIS AUTO W/O SCOPE: CPT | Mod: QW | Performed by: FAMILY MEDICINE

## 2022-03-15 PROCEDURE — G0103 PSA SCREENING: HCPCS | Performed by: FAMILY MEDICINE

## 2022-03-15 PROCEDURE — 99214 OFFICE O/P EST MOD 30 MIN: CPT | Performed by: FAMILY MEDICINE

## 2022-03-15 RX ORDER — ACETAMINOPHEN 325 MG/1
650 TABLET ORAL EVERY 4 HOURS PRN
COMMUNITY
Start: 2021-04-05

## 2022-03-15 RX ORDER — FLUTICASONE PROPIONATE 50 MCG
2 SPRAY, SUSPENSION (ML) NASAL DAILY
Qty: 1 G | Refills: 4 | Status: SHIPPED | OUTPATIENT
Start: 2022-03-15 | End: 2022-11-25

## 2022-03-15 RX ORDER — ALBUTEROL SULFATE 90 UG/1
2 AEROSOL, METERED RESPIRATORY (INHALATION) EVERY 4 HOURS PRN
COMMUNITY
End: 2023-06-20

## 2022-03-15 RX ORDER — NITROGLYCERIN 0.4 MG/1
TABLET SUBLINGUAL
COMMUNITY
Start: 2021-03-26 | End: 2023-11-17

## 2022-03-15 RX ORDER — PREDNISONE 10 MG/1
TABLET ORAL
Qty: 30 TABLET | Refills: 0 | Status: SHIPPED | OUTPATIENT
Start: 2022-03-15 | End: 2022-11-06

## 2022-03-15 RX ORDER — ASPIRIN 81 MG/1
81 TABLET, CHEWABLE ORAL DAILY
COMMUNITY

## 2022-03-15 RX ORDER — IBUPROFEN 200 MG
400 TABLET ORAL EVERY 6 HOURS PRN
COMMUNITY
Start: 2021-04-05

## 2022-03-15 RX ORDER — AMLODIPINE BESYLATE 5 MG/1
5 TABLET ORAL DAILY
COMMUNITY
Start: 2021-12-24 | End: 2022-04-01

## 2022-03-15 RX ORDER — NAPROXEN SODIUM 220 MG
220 TABLET ORAL DAILY PRN
COMMUNITY
Start: 2021-04-05 | End: 2024-01-18

## 2022-03-15 RX ORDER — LOSARTAN POTASSIUM 50 MG/1
50 TABLET ORAL DAILY
COMMUNITY
Start: 2022-02-27 | End: 2023-02-11

## 2022-03-15 RX ORDER — ATORVASTATIN CALCIUM 80 MG/1
80 TABLET, FILM COATED ORAL AT BEDTIME
COMMUNITY
Start: 2021-03-26 | End: 2022-11-25

## 2022-03-15 NOTE — PROGRESS NOTES
"  Assessment & Plan     Dysfunction of both eustachian tubes  Most likely eustachian tube dysfunction we will look at fluticasone and prednisone if symptoms are not better consider ENT referral  - fluticasone (FLONASE) 50 MCG/ACT nasal spray; Spray 2 sprays into both nostrils daily  - predniSONE (DELTASONE) 10 MG tablet; Take 4 tabs a day for 3 days and taper by 1 tab every 3 days    Screening for prostate cancer  Most likely symptoms represent BPH but will get PSA on him consider Flomax or something similar.  Follow-up after labs are done  - Prostate Specific Antigen Screen; Future    Urinary frequency  We will check UA to make sure we do not have any proteinuria or glucosuria  - UA reflex to Microscopic and Culture             BMI:   Estimated body mass index is 32.06 kg/m  as calculated from the following:    Height as of 1/27/22: 1.854 m (6' 1\").    Weight as of this encounter: 110.2 kg (243 lb).           No follow-ups on file.    Quinn Al MD  [facility]    Jose Maria Rivas is a 66 year old who presents for the following health issues patient notes that he has had issues with his ears feeling plugged has had a blow on his nodes get his eardrums open so he can hear.  He had a negative's audiogram per audiologist.  In addition of this he notes that he has had issues for several months now where his increased urinary frequency during the day goes small amounts a couple times an hour he gets up twice at night he does know dysuria no hematuria no other complaints    Hearing Problem    History of Present Illness       Reason for visit:  Ears trouble hearing  Symptom onset:  3-4 weeks ago  Symptoms include:  Hearing is difficult, ears feel plugged up  Symptom intensity:  Moderate    He eats 2-3 servings of fruits and vegetables daily.He consumes 3 sweetened beverage(s) daily.He exercises with enough effort to increase his heart rate 9 or less minutes per day.  He exercises " with enough effort to increase his heart rate 5 days per week.   He is taking medications regularly.       Concern - Bilateral hearing concern - plugged. Has tried OTC products to flush ears to no relief. Hearing test done here a few weeks ago and cleared.           Review of Systems   Constitutional, HEENT, cardiovascular, pulmonary, gi and gu systems are negative, except as otherwise noted.      Objective    /78 (BP Location: Right arm, Patient Position: Sitting, Cuff Size: Adult Large)   Pulse 59   Wt 110.2 kg (243 lb)   BMI 32.06 kg/m    Body mass index is 32.06 kg/m .  Physical Exam   Patient alert no acute distress ears today revealed clinically distortion of his right tympanic membrane his left side is dull neck is supple nose normal pharynx benign

## 2022-03-15 NOTE — LETTER
March 16, 2022      Rob Agrawal   [address]  [place] [address]        Dear ,    We are writing to inform you of your test results.    Your test results fall within the expected range(s) or remain unchanged from previous results.  Please continue with current treatment plan.    Resulted Orders   UA reflex to Microscopic and Culture   Result Value Ref Range    Color Urine Yellow Colorless, Straw, Light Yellow, Yellow    Appearance Urine Clear Clear    Glucose Urine Negative Negative mg/dL    Bilirubin Urine Negative Negative    Ketones Urine Negative Negative mg/dL    Specific Gravity Urine 1.020 1.003 - 1.035    Blood Urine Negative Negative    pH Urine 5.0 5.0 - 7.0    Protein Albumin Urine Negative Negative mg/dL    Urobilinogen Urine 0.2 0.2, 1.0 E.U./dL    Nitrite Urine Negative Negative    Leukocyte Esterase Urine Negative Negative    Narrative    Microscopic not indicated   Prostate Specific Antigen Screen   Result Value Ref Range    Prostate Specific Antigen Screen 2.15 0.00 - 4.00 ug/L    Narrative    Assay Method:  Chemiluminescence using Siemens   Vista analyzer.       If you have any questions or concerns, please call the clinic at the number listed above.       Sincerely,      Quinn Al MD

## 2022-03-15 NOTE — LETTER
March 15, 2022      Rob Agrawal   [address]  [place] [address]        Dear ,    We are writing to inform you of your test results.    Your test results fall within the expected range(s) or remain unchanged from previous results.  Please continue with current treatment plan.    Resulted Orders   UA reflex to Microscopic and Culture   Result Value Ref Range    Color Urine Yellow Colorless, Straw, Light Yellow, Yellow    Appearance Urine Clear Clear    Glucose Urine Negative Negative mg/dL    Bilirubin Urine Negative Negative    Ketones Urine Negative Negative mg/dL    Specific Gravity Urine 1.020 1.003 - 1.035    Blood Urine Negative Negative    pH Urine 5.0 5.0 - 7.0    Protein Albumin Urine Negative Negative mg/dL    Urobilinogen Urine 0.2 0.2, 1.0 E.U./dL    Nitrite Urine Negative Negative    Leukocyte Esterase Urine Negative Negative    Narrative    Microscopic not indicated       If you have any questions or concerns, please call the clinic at the number listed above.       Sincerely,      Quinn Al MD

## 2022-03-16 LAB — PSA SERPL-MCNC: 2.15 UG/L (ref 0–4)

## 2022-03-17 RX ORDER — FLUTICASONE PROPIONATE 50 MCG
SPRAY, SUSPENSION (ML) NASAL
Qty: 48 G | OUTPATIENT
Start: 2022-03-17

## 2022-03-17 NOTE — TELEPHONE ENCOUNTER
Duplicate order per Pharmacy.    Per [facility] min please disregard.    Ziggy Thompson RN  [facility]

## 2022-04-01 DIAGNOSIS — I10 HTN (HYPERTENSION): Primary | ICD-10-CM

## 2022-04-04 ENCOUNTER — TELEPHONE (OUTPATIENT)
Dept: FAMILY MEDICINE | Facility: CLINIC | Age: 67
End: 2022-04-04
Payer: COMMERCIAL

## 2022-04-04 DIAGNOSIS — I10 HTN (HYPERTENSION): ICD-10-CM

## 2022-04-04 RX ORDER — AMLODIPINE BESYLATE 5 MG/1
5 TABLET ORAL DAILY
Qty: 30 TABLET | Refills: 1 | Status: SHIPPED | OUTPATIENT
Start: 2022-04-04 | End: 2022-04-06

## 2022-04-04 NOTE — TELEPHONE ENCOUNTER
Duplicate - Receipt confirmed by pharmacy (4/4/2022  3:11 PM CDT)  Ziggy Thompson RN  [facility]

## 2022-04-04 NOTE — TELEPHONE ENCOUNTER
Patient returning phone call. Can be reached at [phone].    Leanne Goyal on 4/4/2022 at 11:12 AM

## 2022-04-04 NOTE — TELEPHONE ENCOUNTER
Reason for Call:  Other call back    Detailed comments: Please call patient he went to refill on his  Amlodpine 5mg does he need to stop taking this or can he get a refill     Phone Number Patient can be reached at: Home number on file [phone] (home)    Best Time: any    Can we leave a detailed message on this number? YES    Call taken on 4/4/2022 at 10:13 AM by Trina Danielson

## 2022-04-06 RX ORDER — AMLODIPINE BESYLATE 5 MG/1
TABLET ORAL
Qty: 90 TABLET | Refills: 0 | Status: SHIPPED | OUTPATIENT
Start: 2022-04-06 | End: 2022-08-30

## 2022-06-03 DIAGNOSIS — I10 HTN (HYPERTENSION): Primary | ICD-10-CM

## 2022-06-03 DIAGNOSIS — E78.5 ELEVATED LIPIDS: ICD-10-CM

## 2022-06-07 RX ORDER — LOSARTAN POTASSIUM 50 MG/1
50 TABLET ORAL DAILY
Qty: 90 TABLET | Refills: 0 | Status: SHIPPED | OUTPATIENT
Start: 2022-06-07 | End: 2022-11-06

## 2022-06-07 RX ORDER — ATORVASTATIN CALCIUM 80 MG/1
80 TABLET, FILM COATED ORAL DAILY
Qty: 90 TABLET | Refills: 0 | Status: SHIPPED | OUTPATIENT
Start: 2022-06-07 | End: 2022-12-06

## 2022-06-07 NOTE — TELEPHONE ENCOUNTER
Last Written Prescription Date: 9/21/21  Last Fill Quantity: 90,  # refills: 1   Last office visit: 3/15/2022 with prescribing provider   1/27/22 PRESTON MARIN completed  4/3/21 lipid panel completed

## 2022-08-30 DIAGNOSIS — I10 HTN (HYPERTENSION): ICD-10-CM

## 2022-08-30 RX ORDER — AMLODIPINE BESYLATE 5 MG/1
TABLET ORAL
Qty: 90 TABLET | Refills: 0 | Status: SHIPPED | OUTPATIENT
Start: 2022-08-30 | End: 2022-12-06

## 2022-08-30 NOTE — TELEPHONE ENCOUNTER
"TC- please contact patient. 90 day supply of medication sent to pharmacy. Patient is due for 6 month hypertension check.    Last Written Prescription Date:  4/6/22  Last Fill Quantity: 90,  # refills: 0   Last office visit: 3/15/2022   Future Office Visit:            Requested Prescriptions   Pending Prescriptions Disp Refills     amLODIPine (NORVASC) 5 MG tablet [Pharmacy Med Name: AMLODIPINE BESYLATE 5MG TABLETS] 90 tablet 0     Sig: TAKE 1 TABLET(5 MG) BY MOUTH DAILY       Calcium Channel Blockers Protocol  Passed - 8/30/2022  1:11 PM        Passed - Blood pressure under 140/90 in past 12 months     BP Readings from Last 3 Encounters:   03/15/22 131/78   01/27/22 132/78   09/21/21 134/79                 Passed - Recent (12 mo) or future (30 days) visit within the authorizing provider's specialty     Patient has had an office visit with the authorizing provider or a provider within the authorizing providers department within the previous 12 mos or has a future within next 30 days. See \"Patient Info\" tab in inbasket, or \"Choose Columns\" in Meds & Orders section of the refill encounter.              Passed - Medication is active on med list        Passed - Patient is age 18 or older        Passed - Normal serum creatinine on file in past 12 months     Recent Labs   Lab Test 01/27/22  1358   CR 0.93       Ok to refill medication if creatinine is low               ROSITA RICHARD RN 08/30/22 4:36 PM    "

## 2022-09-06 ENCOUNTER — TELEPHONE (OUTPATIENT)
Dept: FAMILY MEDICINE | Facility: CLINIC | Age: 67
End: 2022-09-06

## 2022-09-06 NOTE — TELEPHONE ENCOUNTER
I spoke to pt's insurance and they said that rx is covered under pt's plan. She said it was last processed by [facility] and cannot tell what happened that may have caused this notification. No further action needed.

## 2022-09-06 NOTE — TELEPHONE ENCOUNTER
Fax received from [facility] regarding Rx for Wixela 250-50 INHUB.    This was filled under the [employer] Medicare prescription drug plan. They did provide a temporary supply for patient but this is not covered on formulary and they will not continue to pay for drug after maximum 30 day supply has been received.    Fax us asking for alternative to be sent in ([employer]/members/medicare-plans) or request coverage review ([phone]) if there are special circumstances that require patient to continue on this medication.

## 2022-09-28 NOTE — TELEPHONE ENCOUNTER
Call to patient  Left message to make fasting lab appointment and provider visit appointment  Shiloh Espinal RN

## 2022-11-06 PROBLEM — I25.2 HISTORY OF NON-ST ELEVATION MYOCARDIAL INFARCTION (NSTEMI): Status: ACTIVE | Noted: 2019-03-15

## 2022-11-06 PROBLEM — E66.9 OBESITY: Status: ACTIVE | Noted: 2022-11-06

## 2022-11-06 PROBLEM — J30.9 ALLERGIC RHINITIS: Status: ACTIVE | Noted: 2022-11-06

## 2022-11-06 PROBLEM — J45.909 ASTHMA: Status: ACTIVE | Noted: 2022-11-06

## 2022-11-06 PROBLEM — I25.10 CORONARY ATHEROSCLEROSIS: Status: ACTIVE | Noted: 2019-04-02

## 2022-11-06 PROBLEM — E78.5 ELEVATED LIPIDS: Status: ACTIVE | Noted: 2022-11-06

## 2022-11-25 ENCOUNTER — OFFICE VISIT (OUTPATIENT)
Dept: FAMILY MEDICINE | Facility: CLINIC | Age: 67
End: 2022-11-25
Payer: COMMERCIAL

## 2022-11-25 VITALS
TEMPERATURE: 98.7 F | WEIGHT: 226 LBS | BODY MASS INDEX: 29.82 KG/M2 | OXYGEN SATURATION: 96 % | HEART RATE: 71 BPM | DIASTOLIC BLOOD PRESSURE: 76 MMHG | SYSTOLIC BLOOD PRESSURE: 120 MMHG

## 2022-11-25 DIAGNOSIS — R05.3 CHRONIC COUGH: Primary | ICD-10-CM

## 2022-11-25 PROCEDURE — 99213 OFFICE O/P EST LOW 20 MIN: CPT | Performed by: FAMILY MEDICINE

## 2022-11-25 RX ORDER — DOXYCYCLINE 100 MG/1
100 TABLET ORAL DAILY
Qty: 10 TABLET | Refills: 0 | Status: SHIPPED | OUTPATIENT
Start: 2022-11-25 | End: 2022-12-05

## 2022-11-25 RX ORDER — PREDNISONE 10 MG/1
TABLET ORAL
Qty: 30 TABLET | Refills: 0 | Status: SHIPPED | OUTPATIENT
Start: 2022-11-25 | End: 2023-02-11

## 2022-11-25 ASSESSMENT — ASTHMA QUESTIONNAIRES: ACT_TOTALSCORE: 15

## 2022-11-25 NOTE — PROGRESS NOTES
"  Assessment & Plan     Chronic cough  Patient with upper respiratory infection with chronic cough we will go ahead and look at prednisone taper and doxycycline.  Follow-up next week if not improving sooner if worse.  - predniSONE (DELTASONE) 10 MG tablet; Take 4 tabs a day for 3 days and taper by 1 tab every 3 days  - doxycycline monohydrate (ADOXA) 100 MG tablet; Take 1 tablet (100 mg) by mouth daily for 10 days             BMI:   Estimated body mass index is 29.82 kg/m  as calculated from the following:    Height as of 1/27/22: 1.854 m (6' 1\").    Weight as of this encounter: 102.5 kg (226 lb).           No follow-ups on file.    Quinn Al MD  [facility]    Jose Maria Rivas is a 67 year old, presenting for the following health issues: Patient's had a cold for 2 weeks and started as a cold but now just a cough productive of foul sputum no fevers chills or sweats he is covered hard onto his neck hurts and it radiates into his left shoulder.  No numbness tingling weakness of extremities no fevers chills or sweats.  No chest pain or shortness of breath.  Cough      HPI     Chest/head congestion. Productive cough with yellow phlegm. Unsure of fevers. Negative home COVID test, most recent was 2 days ago. Sx have been present for about 2 weeks. Albuterol has been helping.     ACT Total Scores 11/25/2022   ACT TOTAL SCORE (Goal Greater than or Equal to 20) 15   In the past 12 months, how many times did you visit the emergency room for your asthma without being admitted to the hospital? 0   In the past 12 months, how many times were you hospitalized overnight because of your asthma? 0           Review of Systems   Constitutional, HEENT, cardiovascular, pulmonary, gi and gu systems are negative, except as otherwise noted.      Objective    /76 (BP Location: Right arm, Patient Position: Sitting, Cuff Size: Adult Large)   Pulse 71   Temp 98.7  F (37.1  C) (Temporal)   Wt " 102.5 kg (226 lb)   SpO2 96%   BMI 29.82 kg/m    Body mass index is 29.82 kg/m .  Physical Exam   GENERAL: healthy, alert and no distress  HENT: ear canals and TM's normal, nose and mouth without ulcers or lesions  NECK: no adenopathy, no asymmetry, masses, or scars and thyroid normal to palpation  RESP: lungs clear to auscultation - no rales, rhonchi or wheezes  CV: regular rate and rhythm, normal S1 S2, no S3 or S4, no murmur, click or rub, no peripheral edema and peripheral pulses strong  MS: no gross musculoskeletal defects noted, no edema

## 2022-12-06 DIAGNOSIS — I10 PRIMARY HYPERTENSION: Primary | ICD-10-CM

## 2022-12-08 ENCOUNTER — NURSE TRIAGE (OUTPATIENT)
Dept: NURSING | Facility: CLINIC | Age: 67
End: 2022-12-08

## 2022-12-08 NOTE — TELEPHONE ENCOUNTER
Patient needed refill of Losartan, advised it was pending and inline for refill. Caller agreed to wait for refill.     Camelia Yates RN on 12/8/2022 at 11:24 AM    Reason for Disposition    Caller with prescription and triager answers question    Protocols used: MEDICATION REFILL AND RENEWAL CALL-A-OH

## 2022-12-09 RX ORDER — LOSARTAN POTASSIUM 50 MG/1
50 TABLET ORAL DAILY
Qty: 90 TABLET | Refills: 0 | Status: SHIPPED | OUTPATIENT
Start: 2022-12-09 | End: 2023-03-06

## 2022-12-09 NOTE — TELEPHONE ENCOUNTER
"Routing refill request to provider for review/approval because:  Medication listed as Patient Reported need provider approval.    Last office visit: 11/25/2022       Requested Prescriptions   Pending Prescriptions Disp Refills     losartan (COZAAR) 50 MG tablet 90 tablet 0     Sig: Take 1 tablet (50 mg) by mouth daily       Angiotensin-II Receptors Passed - 12/6/2022  2:26 PM        Passed - Last blood pressure under 140/90 in past 12 months     BP Readings from Last 3 Encounters:   11/25/22 120/76   03/15/22 131/78   01/27/22 132/78                 Passed - Recent (12 mo) or future (30 days) visit within the authorizing provider's specialty     Patient has had an office visit with the authorizing provider or a provider within the authorizing providers department within the previous 12 mos or has a future within next 30 days. See \"Patient Info\" tab in inbasket, or \"Choose Columns\" in Meds & Orders section of the refill encounter.              Passed - Medication is active on med list        Passed - Patient is age 18 or older        Passed - Normal serum creatinine on file in past 12 months     Recent Labs   Lab Test 01/27/22  1358   CR 0.93       Ok to refill medication if creatinine is low          Passed - Normal serum potassium on file in past 12 months     Recent Labs   Lab Test 01/27/22  1358   POTASSIUM 4.4                               "

## 2023-02-11 ENCOUNTER — OFFICE VISIT (OUTPATIENT)
Dept: URGENT CARE | Facility: URGENT CARE | Age: 68
End: 2023-02-11
Payer: COMMERCIAL

## 2023-02-11 VITALS
HEIGHT: 74 IN | HEART RATE: 60 BPM | OXYGEN SATURATION: 99 % | DIASTOLIC BLOOD PRESSURE: 79 MMHG | SYSTOLIC BLOOD PRESSURE: 124 MMHG | WEIGHT: 244.12 LBS | BODY MASS INDEX: 31.33 KG/M2

## 2023-02-11 DIAGNOSIS — N43.3 HYDROCELE, UNSPECIFIED HYDROCELE TYPE: Primary | ICD-10-CM

## 2023-02-11 DIAGNOSIS — N50.812 PAIN IN LEFT TESTICLE: ICD-10-CM

## 2023-02-11 DIAGNOSIS — R35.0 URINARY FREQUENCY: ICD-10-CM

## 2023-02-11 DIAGNOSIS — N50.89 SWELLING OF LEFT HALF OF SCROTUM: ICD-10-CM

## 2023-02-11 LAB
ALBUMIN UR-MCNC: NEGATIVE MG/DL
APPEARANCE UR: CLEAR
BILIRUB UR QL STRIP: NEGATIVE
COLOR UR AUTO: YELLOW
GLUCOSE BLD-MCNC: 101 MG/DL (ref 60–99)
GLUCOSE UR STRIP-MCNC: NEGATIVE MG/DL
HGB UR QL STRIP: NEGATIVE
KETONES UR STRIP-MCNC: NEGATIVE MG/DL
LEUKOCYTE ESTERASE UR QL STRIP: NEGATIVE
NITRATE UR QL: NEGATIVE
PH UR STRIP: 5.5 [PH] (ref 5–7)
SP GR UR STRIP: 1.01 (ref 1–1.03)
UROBILINOGEN UR STRIP-ACNC: 0.2 E.U./DL

## 2023-02-11 PROCEDURE — 81003 URINALYSIS AUTO W/O SCOPE: CPT

## 2023-02-11 PROCEDURE — 36415 COLL VENOUS BLD VENIPUNCTURE: CPT

## 2023-02-11 PROCEDURE — 99214 OFFICE O/P EST MOD 30 MIN: CPT | Performed by: PHYSICIAN ASSISTANT

## 2023-02-11 PROCEDURE — 82947 ASSAY GLUCOSE BLOOD QUANT: CPT

## 2023-02-11 NOTE — PROGRESS NOTES
Assessment & Plan     Pain in left testicle  - US Testicular & Scrotum w Doppler Ltd    Swelling of left half of scrotum    - US Testicular & Scrotum w Doppler Ltd    Urinary frequency  Reassured of normal glucose.  UA is unremarkable no signs of infectious etiology.  He has had problems with urinary frequency in the past as well.  He has no restrictive voiding.  He has had a normal PSA in the last 10 months.  His symptoms are not consistent with prostatitis.  We will have him follow-up with his primary care provider for persistent or worsening symptoms.  - UA macro with reflex to Microscopic and Culture - Clinc Collect  - Glucose, whole blood  - Glucose, whole blood    Hydrocele, unspecified hydrocele type    - Adult Urology  Referral      MDM:  PT was seen for left testicular discomfort and swelling left greater than right.  His exam revealed scrotal mass that was most consistent with hydrocele however unable to clearly palpate the left testicle due to the amount of swelling present.  Is unclear if the testicle itself is swollen and tender or if there are any masses present.  Given this a left testicular and scrotal ultrasound was obtained revealing no evidence for epididymitis orchitis or torsion.  He was found to have a large left hydrocele and small right hydrocele.  Reassured patient of benign etiology of this problem.  He is interested in treatment meant options and discussion regarding potential surgical treatment of this problem.  He will be on vacation for several weeks and upon return we will plan on follow-up with urology at his convenience.    956}    No follow-ups on file.    [facility] [place]    Jose Maria Rivas is a 67 year old male who presents to clinic today for the following health issues:  Chief Complaint   Patient presents with     Testicular Problem     Enlarged left testicle, noticed it since last night noticed has had a lot of  "frequency.  Pt is circum iced and has noticed that it seems to be shrinking or growing back inside      HPI  Patient presents to urgent care with concerns regarding left testicular pain and swelling.  He states he is unclear how long the swelling has been present but over the last 1 day has had left testicular pain.  He has not noted any masses but does feel his left testicle is enlarged.  Frequency, months.  No difficulty starting stream.  No dysuria urinary urgency or incontinence of urine.  Large volumes but does state he drinks a fair amount of water..    Strong stream.    Nocturia 1-2.    No abdomen pain.  No fevers or chills.  He does not tend to be constipated.      Review of Systems  Constitutional, HEENT, cardiovascular, pulmonary, gi and gu systems are negative, except as otherwise noted.      Objective    /79 (BP Location: Right arm, Patient Position: Sitting, Cuff Size: Adult Large)   Pulse 60   Ht 1.88 m (6' 2\")   Wt 110.7 kg (244 lb 1.9 oz)   SpO2 99%   BMI 31.34 kg/m    Physical Exam   nad appears well   exam reveals R testicle, normal size, no masses or tenderness, no inguinal masses.  L testicle with large fluctuant mass in the L hemiscrotum, unclear boarders of the L testicle due to the scrotal mass, mild discomfort posteriorly, no inguinal canal masses.    Results for orders placed or performed in visit on 02/11/23   UA macro with reflex to Microscopic and Culture - Clinc Collect     Status: Normal    Specimen: Urine, Clean Catch   Result Value Ref Range    Color Urine Yellow Colorless, Straw, Light Yellow, Yellow    Appearance Urine Clear Clear    Glucose Urine Negative Negative mg/dL    Bilirubin Urine Negative Negative    Ketones Urine Negative Negative mg/dL    Specific Gravity Urine 1.015 1.003 - 1.035    Blood Urine Negative Negative    pH Urine 5.5 5.0 - 7.0    Protein Albumin Urine Negative Negative mg/dL    Urobilinogen Urine 0.2 0.2, 1.0 E.U./dL    Nitrite Urine Negative " Negative    Leukocyte Esterase Urine Negative Negative    Narrative    Microscopic not indicated   Glucose, whole blood     Status: Abnormal   Result Value Ref Range    Glucose Whole Blood 101 (H) 60 - 99 mg/dL

## 2023-03-02 DIAGNOSIS — I10 PRIMARY HYPERTENSION: ICD-10-CM

## 2023-03-06 RX ORDER — LOSARTAN POTASSIUM 50 MG/1
TABLET ORAL
Qty: 90 TABLET | Refills: 0 | Status: SHIPPED | OUTPATIENT
Start: 2023-03-06 | End: 2023-06-08

## 2023-03-06 NOTE — TELEPHONE ENCOUNTER
"    Last office visit: 11/25/2022     Future Appointments 3/6/2023 - 9/2/2023    None          Requested Prescriptions   Pending Prescriptions Disp Refills     losartan (COZAAR) 50 MG tablet [Pharmacy Med Name: LOSARTAN 50MG TABLETS] 90 tablet 0     Sig: TAKE 1 TABLET(50 MG) BY MOUTH DAILY       Angiotensin-II Receptors Failed - 3/2/2023  1:42 PM        Failed - Normal serum creatinine on file in past 12 months     Recent Labs   Lab Test 01/27/22  1358   CR 0.93       Ok to refill medication if creatinine is low          Failed - Normal serum potassium on file in past 12 months     Recent Labs   Lab Test 01/27/22  1358   POTASSIUM 4.4                    Passed - Last blood pressure under 140/90 in past 12 months     BP Readings from Last 3 Encounters:   02/11/23 124/79   11/25/22 120/76   03/15/22 131/78                 Passed - Recent (12 mo) or future (30 days) visit within the authorizing provider's specialty     Patient has had an office visit with the authorizing provider or a provider within the authorizing providers department within the previous 12 mos or has a future within next 30 days. See \"Patient Info\" tab in inbasket, or \"Choose Columns\" in Meds & Orders section of the refill encounter.              Passed - Medication is active on med list        Passed - Patient is age 18 or older                   "

## 2023-03-15 DIAGNOSIS — R05.3 CHRONIC COUGH: Primary | ICD-10-CM

## 2023-03-16 RX ORDER — FLUTICASONE PROPIONATE AND SALMETEROL 250; 50 UG/1; UG/1
1 POWDER RESPIRATORY (INHALATION) 2 TIMES DAILY
Qty: 180 EACH | Refills: 0 | Status: SHIPPED | OUTPATIENT
Start: 2023-03-16

## 2023-03-16 NOTE — TELEPHONE ENCOUNTER
"  Medication Listed as Patient Reported  Last office visit: 11/25/2022     Future Appointments 3/16/2023 - 9/12/2023    None          Requested Prescriptions   Pending Prescriptions Disp Refills     fluticasone-salmeterol (ADVAIR) 250-50 MCG/ACT inhaler 180 each 0     Sig: Inhale 1 puff into the lungs 2 times daily       Inhaled Steroids Protocol Failed - 3/15/2023  2:31 PM        Failed - Asthma control assessment score within normal limits in last 6 months     Please review ACT score.           Passed - Patient is age 12 or older        Passed - Medication is active on med list        Passed - Recent (6 mo) or future (30 days) visit within the authorizing provider's specialty     Patient had office visit in the last 6 months or has a visit in the next 30 days with authorizing provider or within the authorizing provider's specialty.  See \"Patient Info\" tab in inbasket, or \"Choose Columns\" in Meds & Orders section of the refill encounter.           Long-Acting Beta Agonist Inhalers Protocol  Failed - 3/15/2023  2:31 PM        Failed - Asthma control assessment score within normal limits in last 6 months     Please review ACT score.           Passed - Patient is age 12 or older        Passed - Medication is active on med list        Passed - Recent (6 mo) or future (30 days) visit within the authorizing provider's specialty     Patient had office visit in the last 6 months or has a visit in the next 30 days with authorizing provider or within the authorizing provider's specialty.  See \"Patient Info\" tab in inbasket, or \"Choose Columns\" in Meds & Orders section of the refill encounter.                       "

## 2023-03-29 ENCOUNTER — TRANSFERRED RECORDS (OUTPATIENT)
Dept: HEALTH INFORMATION MANAGEMENT | Facility: CLINIC | Age: 68
End: 2023-03-29
Payer: COMMERCIAL

## 2023-04-10 ENCOUNTER — TELEPHONE (OUTPATIENT)
Dept: FAMILY MEDICINE | Facility: CLINIC | Age: 68
End: 2023-04-10
Payer: COMMERCIAL

## 2023-04-10 NOTE — TELEPHONE ENCOUNTER
Kelly from [employer] called wanting to discuss a PA for Wixela  Case number [id number]; she also noted that it was past the standard turn-around time.     Medication Question or Refill    Contacts       Type Contact Phone/Fax    04/10/2023 08:59 AM CDT Phone (Incoming) Kelly from [facility] - [address] (Pharmacy) [phone]        What medication are you calling about (include dose and sig)?:       Who prescribed the medication?: TFS    Do you have any questions or concerns?  Yes: Kelly from [employer] called wanting to discuss a PA, and she noted that information regarding the PA had already been faxed to us.        Okay to leave a detailed message?: Yes at Other phone number: [phone]  Reference/Case number [id number]

## 2023-04-11 NOTE — TELEPHONE ENCOUNTER
Spoke to Crys at [employer]. She says the denial is for the copay.     Spoke to Sigrid and generic Wixela is $543. Pt has not picked up rx.

## 2023-05-23 ENCOUNTER — TRANSFERRED RECORDS (OUTPATIENT)
Dept: HEALTH INFORMATION MANAGEMENT | Facility: CLINIC | Age: 68
End: 2023-05-23
Payer: COMMERCIAL

## 2023-06-06 DIAGNOSIS — I10 PRIMARY HYPERTENSION: ICD-10-CM

## 2023-06-08 RX ORDER — LOSARTAN POTASSIUM 50 MG/1
TABLET ORAL
Qty: 90 TABLET | Refills: 0 | Status: SHIPPED | OUTPATIENT
Start: 2023-06-08 | End: 2023-09-27

## 2023-06-08 NOTE — TELEPHONE ENCOUNTER
Routing refill request to provider for review/approval because:  Drug fails the [facility] refill protocol         Last Written Prescription Date: 3/6/23  Last Fill Quantity: 90,  # refills: 0   Last office visit: 11/25/2022      Please assist with scheduling annual medicare visit.  Thank you

## 2023-06-18 PROBLEM — I10 PRIMARY HYPERTENSION: Status: ACTIVE | Noted: 2022-11-06

## 2023-06-20 ENCOUNTER — OFFICE VISIT (OUTPATIENT)
Dept: FAMILY MEDICINE | Facility: CLINIC | Age: 68
End: 2023-06-20
Payer: COMMERCIAL

## 2023-06-20 VITALS
HEIGHT: 74 IN | WEIGHT: 239 LBS | TEMPERATURE: 97.9 F | SYSTOLIC BLOOD PRESSURE: 128 MMHG | RESPIRATION RATE: 18 BRPM | DIASTOLIC BLOOD PRESSURE: 78 MMHG | BODY MASS INDEX: 30.67 KG/M2 | OXYGEN SATURATION: 94 % | HEART RATE: 60 BPM

## 2023-06-20 DIAGNOSIS — I25.10 ATHEROSCLEROSIS OF CORONARY ARTERY OF NATIVE HEART WITHOUT ANGINA PECTORIS, UNSPECIFIED VESSEL OR LESION TYPE: ICD-10-CM

## 2023-06-20 DIAGNOSIS — J45.909 UNCOMPLICATED ASTHMA, UNSPECIFIED ASTHMA SEVERITY, UNSPECIFIED WHETHER PERSISTENT: ICD-10-CM

## 2023-06-20 DIAGNOSIS — I25.2 HISTORY OF NON-ST ELEVATION MYOCARDIAL INFARCTION (NSTEMI): ICD-10-CM

## 2023-06-20 DIAGNOSIS — E78.5 ELEVATED LIPIDS: ICD-10-CM

## 2023-06-20 DIAGNOSIS — Z23 NEED FOR DIPHTHERIA-TETANUS-PERTUSSIS (TDAP) VACCINE: ICD-10-CM

## 2023-06-20 DIAGNOSIS — Z00.00 ENCOUNTER FOR MEDICARE ANNUAL WELLNESS EXAM: Primary | ICD-10-CM

## 2023-06-20 DIAGNOSIS — I10 PRIMARY HYPERTENSION: ICD-10-CM

## 2023-06-20 PROBLEM — M16.11 OSTEOARTHRITIS OF RIGHT HIP: Status: ACTIVE | Noted: 2019-12-16

## 2023-06-20 LAB
ALBUMIN SERPL BCG-MCNC: 4.3 G/DL (ref 3.5–5.2)
ALP SERPL-CCNC: 82 U/L (ref 40–129)
ALT SERPL W P-5'-P-CCNC: 36 U/L (ref 0–70)
ANION GAP SERPL CALCULATED.3IONS-SCNC: 13 MMOL/L (ref 7–15)
AST SERPL W P-5'-P-CCNC: 34 U/L (ref 0–45)
BASOPHILS # BLD AUTO: 0 10E3/UL (ref 0–0.2)
BASOPHILS NFR BLD AUTO: 1 %
BILIRUB SERPL-MCNC: 0.6 MG/DL
BUN SERPL-MCNC: 20.4 MG/DL (ref 8–23)
CALCIUM SERPL-MCNC: 9 MG/DL (ref 8.8–10.2)
CHLORIDE SERPL-SCNC: 109 MMOL/L (ref 98–107)
CHOLEST SERPL-MCNC: 190 MG/DL
CREAT SERPL-MCNC: 0.81 MG/DL (ref 0.67–1.17)
DEPRECATED HCO3 PLAS-SCNC: 20 MMOL/L (ref 22–29)
EOSINOPHIL # BLD AUTO: 0.2 10E3/UL (ref 0–0.7)
EOSINOPHIL NFR BLD AUTO: 3 %
ERYTHROCYTE [DISTWIDTH] IN BLOOD BY AUTOMATED COUNT: 13 % (ref 10–15)
GFR SERPL CREATININE-BSD FRML MDRD: >90 ML/MIN/1.73M2
GLUCOSE SERPL-MCNC: 102 MG/DL (ref 70–99)
HCT VFR BLD AUTO: 43 % (ref 40–53)
HDLC SERPL-MCNC: 65 MG/DL
HGB BLD-MCNC: 14.2 G/DL (ref 13.3–17.7)
IMM GRANULOCYTES # BLD: 0 10E3/UL
IMM GRANULOCYTES NFR BLD: 0 %
LDLC SERPL CALC-MCNC: 114 MG/DL
LYMPHOCYTES # BLD AUTO: 1.5 10E3/UL (ref 0.8–5.3)
LYMPHOCYTES NFR BLD AUTO: 24 %
MCH RBC QN AUTO: 30.3 PG (ref 26.5–33)
MCHC RBC AUTO-ENTMCNC: 33 G/DL (ref 31.5–36.5)
MCV RBC AUTO: 92 FL (ref 78–100)
MONOCYTES # BLD AUTO: 0.4 10E3/UL (ref 0–1.3)
MONOCYTES NFR BLD AUTO: 7 %
NEUTROPHILS # BLD AUTO: 4.1 10E3/UL (ref 1.6–8.3)
NEUTROPHILS NFR BLD AUTO: 65 %
NONHDLC SERPL-MCNC: 125 MG/DL
PLATELET # BLD AUTO: 240 10E3/UL (ref 150–450)
POTASSIUM SERPL-SCNC: 4.3 MMOL/L (ref 3.4–5.3)
PROT SERPL-MCNC: 6.3 G/DL (ref 6.4–8.3)
RBC # BLD AUTO: 4.68 10E6/UL (ref 4.4–5.9)
SODIUM SERPL-SCNC: 142 MMOL/L (ref 136–145)
TRIGL SERPL-MCNC: 56 MG/DL
WBC # BLD AUTO: 6.3 10E3/UL (ref 4–11)

## 2023-06-20 PROCEDURE — 85025 COMPLETE CBC W/AUTO DIFF WBC: CPT | Mod: QW | Performed by: FAMILY MEDICINE

## 2023-06-20 PROCEDURE — 80061 LIPID PANEL: CPT | Performed by: FAMILY MEDICINE

## 2023-06-20 PROCEDURE — 80053 COMPREHEN METABOLIC PANEL: CPT | Performed by: FAMILY MEDICINE

## 2023-06-20 PROCEDURE — 36415 COLL VENOUS BLD VENIPUNCTURE: CPT | Performed by: FAMILY MEDICINE

## 2023-06-20 PROCEDURE — G0438 PPPS, INITIAL VISIT: HCPCS | Performed by: FAMILY MEDICINE

## 2023-06-20 PROCEDURE — 99214 OFFICE O/P EST MOD 30 MIN: CPT | Mod: 25 | Performed by: FAMILY MEDICINE

## 2023-06-20 PROCEDURE — G0009 ADMIN PNEUMOCOCCAL VACCINE: HCPCS | Performed by: FAMILY MEDICINE

## 2023-06-20 PROCEDURE — 90677 PCV20 VACCINE IM: CPT | Performed by: FAMILY MEDICINE

## 2023-06-20 RX ORDER — ALBUTEROL SULFATE 90 UG/1
2 AEROSOL, METERED RESPIRATORY (INHALATION) EVERY 4 HOURS PRN
Qty: 18 G | Refills: 11 | Status: SHIPPED | OUTPATIENT
Start: 2023-06-20 | End: 2023-11-17

## 2023-06-20 RX ORDER — ATORVASTATIN CALCIUM 80 MG/1
80 TABLET, FILM COATED ORAL DAILY
Qty: 90 TABLET | Refills: 2 | Status: SHIPPED | OUTPATIENT
Start: 2023-06-20 | End: 2023-11-17

## 2023-06-20 RX ORDER — AMLODIPINE BESYLATE 5 MG/1
5 TABLET ORAL DAILY
Qty: 90 TABLET | Refills: 2 | Status: SHIPPED | OUTPATIENT
Start: 2023-06-20 | End: 2023-11-17

## 2023-06-20 ASSESSMENT — ENCOUNTER SYMPTOMS
DIZZINESS: 1
CHILLS: 0
HEADACHES: 0
EYE PAIN: 0
WEAKNESS: 0
SHORTNESS OF BREATH: 1
COUGH: 0
ARTHRALGIAS: 0
SORE THROAT: 0
HEMATURIA: 0
PARESTHESIAS: 0
MYALGIAS: 0
CONSTIPATION: 0
DYSURIA: 0
JOINT SWELLING: 0
ABDOMINAL PAIN: 0
DIARRHEA: 0
NERVOUS/ANXIOUS: 0
HEMATOCHEZIA: 0
PALPITATIONS: 0
NAUSEA: 0
HEARTBURN: 0
FEVER: 0

## 2023-06-20 ASSESSMENT — ASTHMA QUESTIONNAIRES
QUESTION_4 LAST FOUR WEEKS HOW OFTEN HAVE YOU USED YOUR RESCUE INHALER OR NEBULIZER MEDICATION (SUCH AS ALBUTEROL): ONE OR TWO TIMES PER DAY
ACT_TOTALSCORE: 16
ACT_TOTALSCORE: 16
QUESTION_3 LAST FOUR WEEKS HOW OFTEN DID YOUR ASTHMA SYMPTOMS (WHEEZING, COUGHING, SHORTNESS OF BREATH, CHEST TIGHTNESS OR PAIN) WAKE YOU UP AT NIGHT OR EARLIER THAN USUAL IN THE MORNING: NOT AT ALL
QUESTION_1 LAST FOUR WEEKS HOW MUCH OF THE TIME DID YOUR ASTHMA KEEP YOU FROM GETTING AS MUCH DONE AT WORK, SCHOOL OR AT HOME: A LITTLE OF THE TIME
QUESTION_5 LAST FOUR WEEKS HOW WOULD YOU RATE YOUR ASTHMA CONTROL: SOMEWHAT CONTROLLED
QUESTION_2 LAST FOUR WEEKS HOW OFTEN HAVE YOU HAD SHORTNESS OF BREATH: ONCE A DAY

## 2023-06-20 ASSESSMENT — ACTIVITIES OF DAILY LIVING (ADL): CURRENT_FUNCTION: NO ASSISTANCE NEEDED

## 2023-06-20 NOTE — PATIENT INSTRUCTIONS
Patient Education   Personalized Prevention Plan  You are due for the preventive services outlined below.  Your care team is available to assist you in scheduling these services.  If you have already completed any of these items, please share that information with your care team to update in your medical record.  Health Maintenance Due   Topic Date Due     Asthma Action Plan - yearly  Never done     COVID-19 Vaccine (4 - Moderna series) 02/22/2022     Annual Wellness Visit  05/25/2022     AORTIC ANEURYSM SCREENING (SYSTEM ASSIGNED)  Never done     Diptheria Tetanus Pertussis (DTAP/TDAP/TD) Vaccine (2 - Td or Tdap) 05/05/2023       Signs of Hearing Loss  Hearing loss is a problem shared by many people. In fact, it's one of the most common health problems, particularly as people age. Most people aged 65 and older have some hearing loss. By age 80, almost everyone does. Hearing loss often occurs slowly over the years. So, you may not realize your hearing has gotten worse.   When sudden hearing loss occurs, it's important to contact your healthcare provider right away. Your provider will do a medical exam and a hearing exam as soon as possible. This is to help find the cause and type of your sudden hearing loss. Based on your diagnosis, your healthcare provider will discuss possible treatments.      Hearing much better with one ear can be a sign of hearing loss.     Have your hearing checked  Call your healthcare provider if you:     Have to strain to hear normal conversation    Have to watch other people s faces very carefully to follow what they re saying    Need to ask people to repeat what they ve said    Often misunderstand what people are saying    Turn the volume of the television or radio up so high that others complain    Feel that people are mumbling when they re talking to you    Find that the effort to hear leaves you feeling tired and irritated    Notice, when using the phone, that you hear better  with one ear than the other  [employer] last reviewed this educational content on 6/1/2022 2000-2022 [employer]. All rights reserved. This information is not intended as a substitute for professional medical care. Always follow your healthcare professional's instructions.

## 2023-06-20 NOTE — PROGRESS NOTES
"SUBJECTIVE:   Rob is a 68 year old who presents for Preventive Visit.  Overall doing well.  Continues to live independently with his wife.  [place]  They have chickens.  He is retired.  Continues to be followed by cardiology.  No current health concerns        6/20/2023    10:27 AM   Additional Questions   Roomed by clj lpn   Accompanied by -     Are you in the first 12 months of your Medicare coverage?  Yes,  Visual Acuity:  Right Eye: 20/20   Left Eye: 20/20  Both Eyes: 20/20    Healthy Habits:     In general, how would you rate your overall health?  Good    Frequency of exercise:  4-5 days/week    Duration of exercise:  15-30 minutes    Do you usually eat at least 4 servings of fruit and vegetables a day, include whole grains    & fiber and avoid regularly eating high fat or \"junk\" foods?  Yes    Taking medications regularly:  Yes    Medication side effects:  None, No muscle aches and No significant flushing    Ability to successfully perform activities of daily living:  No assistance needed    Home Safety:  No safety concerns identified    Hearing Impairment:  Difficulty following a conversation in a noisy restaurant or crowded room and difficulty understanding speech on the telephone    In the past 6 months, have you been bothered by leaking of urine?  No    In general, how would you rate your overall mental or emotional health?  Good      PHQ-2 Total Score: 0    Additional concerns today:  No        Have you ever done Advance Care Planning? (For example, a Health Directive, POLST, or a discussion with a medical provider or your loved ones about your wishes): No, advance care planning information given to patient to review.  Patient plans to discuss their wishes with loved ones or provider.        Right Ear:      1000 Hz RESPONSE- on Level: 40 db (Conditioning sound)   1000 Hz: RESPONSE- on Level:   20 db    2000 Hz: RESPONSE- on Level:   20 db    4000 Hz: RESPONSE- on Level:   20 " db     Left Ear:      4000 Hz: RESPONSE- on Level: 35 db   2000 Hz: RESPONSE- on Level:   20 db    1000 Hz: RESPONSE- on Level:   20 db     500 Hz: RESPONSE- on Level: 25 db    Right Ear:    500 Hz: RESPONSE- on Level: 25 db    Hearing Acuity: REFER    Hearing Assessment: normal   Fall risk  Fallen 2 or more times in the past year?: No  Any fall with injury in the past year?: No    Cognitive Screening   1) Repeat 3 items (Leader, Season, Table)    2) Clock draw: NORMAL  3) 3 item recall: Recalls 3 objects  Results: 3 items recalled: COGNITIVE IMPAIRMENT LESS LIKELY    Mini-CogTM Copyright S Sudheer. Licensed by the author for use in [facility]; reprinted with permission (soob@[facility]). All rights reserved.      Do you have sleep apnea, excessive snoring or daytime drowsiness?: no    Reviewed and updated as needed this visit by clinical staff   Tobacco  Allergies  Meds              Reviewed and updated as needed this visit by Provider                 Social History     Tobacco Use     Smoking status: Former     Types: Cigars     Smokeless tobacco: Never   Vaping Use     Vaping status: Never Used   Substance Use Topics     Alcohol use: Not on file             6/20/2023    10:16 AM   Alcohol Use   Prescreen: >3 drinks/day or >7 drinks/week? No     Do you have a current opioid prescription? No  Do you use any other controlled substances or medications that are not prescribed by a provider? None              Current providers sharing in care for this patient include: 5  Patient Care Team:  Quinn Al MD as PCP - General (Family Medicine)  Quinn Al MD as Assigned PCP    The following health maintenance items are reviewed in Epic and correct as of today:  Health Maintenance   Topic Date Due     ASTHMA ACTION PLAN  Never done     COVID-19 Vaccine (4 - Moderna series) 02/22/2022     MEDICARE ANNUAL WELLNESS VISIT  05/25/2022     AORTIC ANEURYSM SCREENING (SYSTEM ASSIGNED)  Never done      DTAP/TDAP/TD IMMUNIZATION (2 - Td or Tdap) 05/05/2023     HEPATITIS C SCREENING  06/18/2024 (Originally 5/25/1973)     INFLUENZA VACCINE (Season Ended) 09/01/2023     ANNUAL REVIEW OF HM ORDERS  11/25/2023     ASTHMA CONTROL TEST  12/20/2023     FALL RISK ASSESSMENT  06/20/2024     LIPID  04/02/2026     ADVANCE CARE PLANNING  06/20/2028     COLORECTAL CANCER SCREENING  07/18/2032     PHQ-2 (once per calendar year)  Completed     Pneumococcal Vaccine: 65+ Years  Completed     ZOSTER IMMUNIZATION  Completed     IPV IMMUNIZATION  Aged Out     MENINGITIS IMMUNIZATION  Aged Out     LUNG CANCER SCREENING  Discontinued       Patient Active Problem List   Diagnosis     History of non-ST elevation myocardial infarction (NSTEMI)     Hyperlipidemia     Elevated lipids     Obesity     Primary hypertension     Coronary atherosclerosis     Asthma     Allergic rhinitis     Osteoarthritis of right hip     Current Outpatient Medications   Medication     acetaminophen (TYLENOL) 325 MG tablet     albuterol (PROAIR HFA/PROVENTIL HFA/VENTOLIN HFA) 108 (90 Base) MCG/ACT inhaler     amLODIPine (NORVASC) 5 MG tablet     aspirin (ASA) 81 MG chewable tablet     atorvastatin (LIPITOR) 80 MG tablet     ibuprofen (ADVIL/MOTRIN) 200 MG tablet     losartan (COZAAR) 50 MG tablet     naproxen sodium (ANAPROX) 220 MG tablet     nitroGLYcerin (NITROSTAT) 0.4 MG sublingual tablet     Tdap, tetanus-diptheria-acell pertussis, (BOOSTRIX) 5-2.5-18.5 LF-MCG/0.5 JARET injection     fluticasone-salmeterol (ADVAIR) 250-50 MCG/ACT inhaler     No current facility-administered medications for this visit.             Review of Systems   Constitutional: Negative for chills and fever.   HENT: Negative for ear pain, hearing loss and sore throat.    Eyes: Positive for visual disturbance. Negative for pain.   Respiratory: Positive for shortness of breath. Negative for cough.    Cardiovascular: Negative for chest pain, palpitations and peripheral edema.  "  Gastrointestinal: Negative for abdominal pain, constipation, diarrhea, heartburn, hematochezia and nausea.   Genitourinary: Positive for impotence and urgency. Negative for dysuria, genital sores, hematuria and penile discharge.   Musculoskeletal: Negative for arthralgias, joint swelling and myalgias.   Skin: Negative for rash.   Neurological: Positive for dizziness. Negative for weakness, headaches and paresthesias.   Psychiatric/Behavioral: Negative for mood changes. The patient is not nervous/anxious.      Constitutional, HEENT, cardiovascular, pulmonary, gi and gu systems are negative, except as otherwise noted.    OBJECTIVE:   /78   Pulse 60   Temp 97.9  F (36.6  C)   Resp 18   Ht 1.88 m (6' 2\")   Wt 108.4 kg (239 lb)   SpO2 94%   BMI 30.69 kg/m   Estimated body mass index is 30.69 kg/m  as calculated from the following:    Height as of this encounter: 1.88 m (6' 2\").    Weight as of this encounter: 108.4 kg (239 lb).  Physical Exam  GENERAL: healthy, alert and no distress  NECK: no adenopathy, no asymmetry, masses, or scars and thyroid normal to palpation  RESP: lungs clear to auscultation - no rales, rhonchi or wheezes  CV: regular rate and rhythm, normal S1 S2, no S3 or S4, no murmur, click or rub, no peripheral edema and peripheral pulses strong  ABDOMEN: soft, nontender, no hepatosplenomegaly, no masses and bowel sounds normal  MS: no gross musculoskeletal defects noted, no edema        ASSESSMENT / PLAN:   (Z00.00) Encounter for Medicare annual wellness exam  (primary encounter diagnosis)  Comment: Healthcare maintenance and advance directives reviewed  Plan: PRIMARY CARE FOLLOW-UP SCHEDULING            (I10) Primary hypertension  Comment: Under excellent control with amlodipine and losartan  Plan: CBC with Platelets & Differential,         Comprehensive metabolic panel, amLODIPine         (NORVASC) 5 MG tablet            (E78.5) Elevated lipids  Comment:  Controlled with Lipitor  Plan: " "Lipid panel reflex to direct LDL Fasting,         atorvastatin (LIPITOR) 80 MG tablet            (I25.10) Atherosclerosis of coronary artery of native heart without angina pectoris, unspecified vessel or lesion type  Comment: By cardiology  Plan:     (I25.2) History of non-ST elevation myocardial infarction (NSTEMI)  Comment: Follow-up with cardiology  Plan:     (J45.453) Uncomplicated asthma, unspecified asthma severity, unspecified whether persistent  Comment: His albuterol 2 or 3 times a week he cannot afford the Advair  Plan: albuterol (PROAIR HFA/PROVENTIL HFA/VENTOLIN         HFA) 108 (90 Base) MCG/ACT inhaler            (Z23) Need for diphtheria-tetanus-pertussis (Tdap) vaccine  Comment: Plan: Tdap, tetanus-diptheria-acell pertussis,         (BOOSTRIX) 5-2.5-18.5 LF-MCG/0.5 JARET injection                      COUNSELING:  Reviewed preventive health counseling, as reflected in patient instructions      BMI:   Estimated body mass index is 30.69 kg/m  as calculated from the following:    Height as of this encounter: 1.88 m (6' 2\").    Weight as of this encounter: 108.4 kg (239 lb).         He reports that he has quit smoking. His smoking use included cigars. He has never used smokeless tobacco.      Appropriate preventive services were discussed with this patient, including applicable screening as appropriate for cardiovascular disease, diabetes, osteopenia/osteoporosis, and glaucoma.  As appropriate for age/gender, discussed screening for colorectal cancer, prostate cancer, breast cancer, and cervical cancer. Checklist reviewing preventive services available has been given to the patient.    Reviewed patients plan of care and provided an AVS. The Basic Care Plan (routine screening as documented in Health Maintenance) for Rob meets the Care Plan requirement. This Care Plan has been established and reviewed with the Patient.      Quinn Al MD  [facility]    Identified " Health Risks:

## 2023-06-20 NOTE — PROGRESS NOTES
"    The patient was provided with written information regarding signs of hearing loss.  Answers for HPI/ROS submitted by the patient on 6/20/2023  In general, how would you rate your overall physical health?: good  Frequency of exercise:: 4-5 days/week  Do you usually eat at least 4 servings of fruit and vegetables a day, include whole grains & fiber, and avoid regularly eating high fat or \"junk\" foods? : Yes  Taking medications regularly:: Yes  Medication side effects:: None, No muscle aches, No significant flushing  Activities of Daily Living: no assistance needed  Home safety: no safety concerns identified  Hearing Impairment:: difficulty following a conversation in a noisy restaurant or crowded room, difficulty understanding speech on the telephone  In the past 6 months, have you been bothered by leaking of urine?: No  abdominal pain: No  Blood in stool: No  Blood in urine: No  chest pain: No  chills: No  constipation: No  cough: No  diarrhea: No  dizziness: Yes  ear pain: No  eye pain: No  nervous/anxious: No  fever: No  genital sores: No  headaches: No  hearing loss: No  heartburn: No  arthralgias: No  joint swelling: No  peripheral edema: No  mood changes: No  myalgias: No  nausea: No  dysuria: No  palpitations: No  Skin sensation changes: No  sore throat: No  urgency: Yes  rash: No  shortness of breath: Yes  visual disturbance: Yes  weakness: No  impotence: Yes  penile discharge: No  In general, how would you rate your overall mental or emotional health?: good  Additional concerns today:: No  Duration of exercise:: 15-30 minutes        "

## 2023-06-20 NOTE — LETTER
June 20, 2023      Rob Agrawal   Formerly [facility], later [facility]  [facility] [id number]        Dear ,    We are writing to inform you of your test results.    Your test results fall within the expected range(s) or remain unchanged from previous results.  Please continue with current treatment plan.    Resulted Orders   Comprehensive metabolic panel   Result Value Ref Range    Sodium 142 136 - 145 mmol/L    Potassium 4.3 3.4 - 5.3 mmol/L    Chloride 109 (H) 98 - 107 mmol/L    Carbon Dioxide (CO2) 20 (L) 22 - 29 mmol/L    Anion Gap 13 7 - 15 mmol/L    Urea Nitrogen 20.4 8.0 - 23.0 mg/dL    Creatinine 0.81 0.67 - 1.17 mg/dL    Calcium 9.0 8.8 - 10.2 mg/dL    Glucose 102 (H) 70 - 99 mg/dL    Alkaline Phosphatase 82 40 - 129 U/L    AST 34 0 - 45 U/L      Comment:      Reference intervals for this test were updated on 6/12/2023 to more accurately reflect our healthy population. There may be differences in the flagging of prior results with similar values performed with this method. Interpretation of those prior results can be made in the context of the updated reference intervals.    ALT 36 0 - 70 U/L      Comment:      Reference intervals for this test were updated on 6/12/2023 to more accurately reflect our healthy population. There may be differences in the flagging of prior results with similar values performed with this method. Interpretation of those prior results can be made in the context of the updated reference intervals.      Protein Total 6.3 (L) 6.4 - 8.3 g/dL    Albumin 4.3 3.5 - 5.2 g/dL    Bilirubin Total 0.6 <=1.2 mg/dL    GFR Estimate >90 >60 mL/min/1.73m2   Lipid panel reflex to direct LDL Fasting   Result Value Ref Range    Cholesterol 190 <200 mg/dL    Triglycerides 56 <150 mg/dL    Direct Measure HDL 65 >=40 mg/dL    LDL Cholesterol Calculated 114 (H) <=100 mg/dL    Non HDL Cholesterol 125 <130 mg/dL    Narrative    Cholesterol  Desirable:  <200 mg/dL    Triglycerides  Normal:  Less than 150  mg/dL  Borderline High:  150-199 mg/dL  High:  200-499 mg/dL  Very High:  Greater than or equal to 500 mg/dL    Direct Measure HDL  Female:  Greater than or equal to 50 mg/dL   Male:  Greater than or equal to 40 mg/dL    LDL Cholesterol  Desirable:  <100mg/dL  Above Desirable:  100-129 mg/dL   Borderline High:  130-159 mg/dL   High:  160-189 mg/dL   Very High:  >= 190 mg/dL    Non HDL Cholesterol  Desirable:  130 mg/dL  Above Desirable:  130-159 mg/dL  Borderline High:  160-189 mg/dL  High:  190-219 mg/dL  Very High:  Greater than or equal to 220 mg/dL   CBC with platelets and differential   Result Value Ref Range    WBC Count 6.3 4.0 - 11.0 10e3/uL    RBC Count 4.68 4.40 - 5.90 10e6/uL    Hemoglobin 14.2 13.3 - 17.7 g/dL    Hematocrit 43.0 40.0 - 53.0 %    MCV 92 78 - 100 fL    MCH 30.3 26.5 - 33.0 pg    MCHC 33.0 31.5 - 36.5 g/dL    RDW 13.0 10.0 - 15.0 %    Platelet Count 240 150 - 450 10e3/uL    % Neutrophils 65 %    % Lymphocytes 24 %    % Monocytes 7 %    % Eosinophils 3 %    % Basophils 1 %    % Immature Granulocytes 0 %    Absolute Neutrophils 4.1 1.6 - 8.3 10e3/uL    Absolute Lymphocytes 1.5 0.8 - 5.3 10e3/uL    Absolute Monocytes 0.4 0.0 - 1.3 10e3/uL    Absolute Eosinophils 0.2 0.0 - 0.7 10e3/uL    Absolute Basophils 0.0 0.0 - 0.2 10e3/uL    Absolute Immature Granulocytes 0.0 <=0.4 10e3/uL       If you have any questions or concerns, please call the clinic at the number listed above.       Sincerely,      Quinn Al MD

## 2023-09-27 DIAGNOSIS — I10 PRIMARY HYPERTENSION: ICD-10-CM

## 2023-09-27 RX ORDER — LOSARTAN POTASSIUM 50 MG/1
50 TABLET ORAL DAILY
Qty: 90 TABLET | Refills: 1 | Status: SHIPPED | OUTPATIENT
Start: 2023-09-27 | End: 2024-04-10

## 2023-11-17 ENCOUNTER — OFFICE VISIT (OUTPATIENT)
Dept: FAMILY MEDICINE | Facility: CLINIC | Age: 68
End: 2023-11-17
Payer: COMMERCIAL

## 2023-11-17 ENCOUNTER — TELEPHONE (OUTPATIENT)
Dept: FAMILY MEDICINE | Facility: CLINIC | Age: 68
End: 2023-11-17

## 2023-11-17 VITALS
RESPIRATION RATE: 16 BRPM | HEIGHT: 74 IN | BODY MASS INDEX: 30.93 KG/M2 | HEART RATE: 60 BPM | SYSTOLIC BLOOD PRESSURE: 134 MMHG | OXYGEN SATURATION: 98 % | WEIGHT: 241 LBS | DIASTOLIC BLOOD PRESSURE: 78 MMHG | TEMPERATURE: 97.4 F

## 2023-11-17 DIAGNOSIS — N43.3 HYDROCELE, UNSPECIFIED HYDROCELE TYPE: ICD-10-CM

## 2023-11-17 DIAGNOSIS — I25.2 HISTORY OF NON-ST ELEVATION MYOCARDIAL INFARCTION (NSTEMI): ICD-10-CM

## 2023-11-17 DIAGNOSIS — I10 PRIMARY HYPERTENSION: Primary | ICD-10-CM

## 2023-11-17 DIAGNOSIS — E78.5 ELEVATED LIPIDS: ICD-10-CM

## 2023-11-17 DIAGNOSIS — Z29.11 NEED FOR VACCINATION AGAINST RESPIRATORY SYNCYTIAL VIRUS: ICD-10-CM

## 2023-11-17 DIAGNOSIS — Z23 NEED FOR TDAP VACCINATION: ICD-10-CM

## 2023-11-17 DIAGNOSIS — J45.909 UNCOMPLICATED ASTHMA, UNSPECIFIED ASTHMA SEVERITY, UNSPECIFIED WHETHER PERSISTENT: ICD-10-CM

## 2023-11-17 DIAGNOSIS — R35.0 URINARY FREQUENCY: ICD-10-CM

## 2023-11-17 DIAGNOSIS — Z12.5 SCREENING FOR PROSTATE CANCER: ICD-10-CM

## 2023-11-17 DIAGNOSIS — I25.10 ATHEROSCLEROSIS OF CORONARY ARTERY OF NATIVE HEART WITHOUT ANGINA PECTORIS, UNSPECIFIED VESSEL OR LESION TYPE: ICD-10-CM

## 2023-11-17 LAB
ALBUMIN UR-MCNC: NEGATIVE MG/DL
APPEARANCE UR: CLEAR
BILIRUB UR QL STRIP: NEGATIVE
COLOR UR AUTO: YELLOW
GLUCOSE UR STRIP-MCNC: NEGATIVE MG/DL
HGB UR QL STRIP: NEGATIVE
KETONES UR STRIP-MCNC: NEGATIVE MG/DL
LEUKOCYTE ESTERASE UR QL STRIP: NEGATIVE
NITRATE UR QL: NEGATIVE
PH UR STRIP: 6.5 [PH] (ref 5–7)
PSA SERPL DL<=0.01 NG/ML-MCNC: 1.82 NG/ML (ref 0–4.5)
SP GR UR STRIP: 1.02 (ref 1–1.03)
UROBILINOGEN UR STRIP-ACNC: 0.2 E.U./DL

## 2023-11-17 PROCEDURE — 90662 IIV NO PRSV INCREASED AG IM: CPT | Performed by: FAMILY MEDICINE

## 2023-11-17 PROCEDURE — G0103 PSA SCREENING: HCPCS | Performed by: FAMILY MEDICINE

## 2023-11-17 PROCEDURE — 91320 SARSCV2 VAC 30MCG TRS-SUC IM: CPT | Performed by: FAMILY MEDICINE

## 2023-11-17 PROCEDURE — 90480 ADMN SARSCOV2 VAC 1/ONLY CMP: CPT | Performed by: FAMILY MEDICINE

## 2023-11-17 PROCEDURE — 36415 COLL VENOUS BLD VENIPUNCTURE: CPT | Performed by: FAMILY MEDICINE

## 2023-11-17 PROCEDURE — 99214 OFFICE O/P EST MOD 30 MIN: CPT | Mod: 25 | Performed by: FAMILY MEDICINE

## 2023-11-17 PROCEDURE — G0008 ADMIN INFLUENZA VIRUS VAC: HCPCS | Performed by: FAMILY MEDICINE

## 2023-11-17 PROCEDURE — 81003 URINALYSIS AUTO W/O SCOPE: CPT | Mod: QW | Performed by: FAMILY MEDICINE

## 2023-11-17 RX ORDER — AMLODIPINE BESYLATE 5 MG/1
5 TABLET ORAL DAILY
Qty: 90 TABLET | Refills: 2 | Status: SHIPPED | OUTPATIENT
Start: 2023-11-17

## 2023-11-17 RX ORDER — RESPIRATORY SYNCYTIAL VIRUS VACCINE 120MCG/0.5
0.5 KIT INTRAMUSCULAR ONCE
Qty: 1 EACH | Refills: 0 | Status: SHIPPED | OUTPATIENT
Start: 2023-11-17 | End: 2023-11-17

## 2023-11-17 RX ORDER — ALBUTEROL SULFATE 90 UG/1
2 AEROSOL, METERED RESPIRATORY (INHALATION) EVERY 4 HOURS PRN
Qty: 18 G | Refills: 11 | Status: SHIPPED | OUTPATIENT
Start: 2023-11-17

## 2023-11-17 RX ORDER — ATORVASTATIN CALCIUM 80 MG/1
80 TABLET, FILM COATED ORAL DAILY
Qty: 90 TABLET | Refills: 2 | Status: SHIPPED | OUTPATIENT
Start: 2023-11-17 | End: 2024-01-18 | Stop reason: ALTCHOICE

## 2023-11-17 RX ORDER — NITROGLYCERIN 0.4 MG/1
TABLET SUBLINGUAL
Qty: 30 TABLET | Refills: 1 | Status: SHIPPED | OUTPATIENT
Start: 2023-11-17

## 2023-11-17 RX ORDER — TAMSULOSIN HYDROCHLORIDE 0.4 MG/1
0.4 CAPSULE ORAL DAILY
Qty: 30 CAPSULE | Refills: 1 | Status: SHIPPED | OUTPATIENT
Start: 2023-11-17

## 2023-11-17 ASSESSMENT — ASTHMA QUESTIONNAIRES: ACT_TOTALSCORE: 19

## 2023-11-17 NOTE — LETTER
November 18, 2023      Rob Agrawal   [facility] [address]        Dear ,    We are writing to inform you of your test results.    Your test results fall within the expected range(s) or remain unchanged from previous results.  Please continue with current treatment plan.    Resulted Orders   PSA, screen   Result Value Ref Range    Prostate Specific Antigen Screen 1.82 0.00 - 4.50 ng/mL    Narrative    This result is obtained using the Roche Elecsys total PSA method on the solitario [id number] immunoassay analyzer. Results obtained with different assay methods or kits cannot be used interchangeably.   UA Macroscopic with reflex to Microscopic and Culture - Lab Collect   Result Value Ref Range    Color Urine Yellow Colorless, Straw, Light Yellow, Yellow    Appearance Urine Clear Clear    Glucose Urine Negative Negative mg/dL    Bilirubin Urine Negative Negative    Ketones Urine Negative Negative mg/dL    Specific Gravity Urine 1.020 1.003 - 1.035    Blood Urine Negative Negative    pH Urine 6.5 5.0 - 7.0    Protein Albumin Urine Negative Negative mg/dL    Urobilinogen Urine 0.2 0.2, 1.0 E.U./dL    Nitrite Urine Negative Negative    Leukocyte Esterase Urine Negative Negative    Narrative    Microscopic not indicated       If you have any questions or concerns, please call the clinic at the number listed above.       Sincerely,      Quinn Al MD

## 2023-11-17 NOTE — PROGRESS NOTES
Assessment & Plan     Primary hypertension  Under good control with amlodipine and losartan  - amLODIPine (NORVASC) 5 MG tablet; Take 1 tablet (5 mg) by mouth daily    Atherosclerosis of coronary artery of native heart without angina pectoris, unspecified vessel or lesion type  Cardiology negative follow-ups since his NSTEMI    History of non-ST elevation myocardial infarction (NSTEMI)    - nitroGLYcerin (NITROSTAT) 0.4 MG sublingual tablet; PLACE 1 TABLET BY MOUTH UNDER THE TONGUE EVERY 5 MINUTES AS NEEDED FOR CHEST PAIN    Elevated lipids  Controlled with atorvastatin  - atorvastatin (LIPITOR) 80 MG tablet; Take 1 tablet (80 mg) by mouth daily    Uncomplicated asthma, unspecified asthma severity, unspecified whether persistent  Rare use of albuterol no longer using Advair  - albuterol (PROAIR HFA/PROVENTIL HFA/VENTOLIN HFA) 108 (90 Base) MCG/ACT inhaler; Inhale 2 puffs into the lungs every 4 hours as needed    Urinary frequency  This has been chronic but he like to have something that will help him for a long plane flight.  We discussed trying Flomax  - UA Macroscopic with reflex to Microscopic and Culture - Lab Collect; Future  - tamsulosin (FLOMAX) 0.4 MG capsule; Take 1 capsule (0.4 mg) by mouth daily  - UA Macroscopic with reflex to Microscopic and Culture - Lab Collect    Hydrocele, unspecified hydrocele type  See urology  - Adult Urology  Referral; Future                 Quinn Al MD  [facility]    Jose Maria Rivas is a 68 year old, presenting for the following health issues: Overall doing well.  Continues to live independently with his wife.  They were in a wedding venue.  Traveling soon to [place].  Hydroceles are getting more troublesome.  Does note chronic urinary frequency would like something for his upcoming flight.  He is very active no chest pain or shortness of breath  Recheck Medication and testicle concern      11/17/2023     9:25 AM    Additional Questions   Roomed by Luz Elena       History of Present Illness     Asthma:  He presents for follow up of asthma.  He has no cough, no wheezing, and some shortness of breath.  He is using a relief medication a few times a week. He does not have a controller medication. Patient is aware of the following triggers: animal dander, cold air and dust mites. The patient has not had a visit to the Emergency Room, Urgent Care or Hospital due to asthma since the last clinic visit.     Reason for visit:  Lots of questions    He eats 2-3 servings of fruits and vegetables daily.He consumes 2 sweetened beverage(s) daily.He exercises with enough effort to increase his heart rate 10 to 19 minutes per day.  He exercises with enough effort to increase his heart rate 5 days per week. He is missing 1 dose(s) of medications per week.           Patient Active Problem List   Diagnosis    History of non-ST elevation myocardial infarction (NSTEMI)    Hyperlipidemia    Elevated lipids    Obesity    Primary hypertension    Coronary atherosclerosis    Asthma    Allergic rhinitis    Osteoarthritis of right hip     Current Outpatient Medications   Medication    acetaminophen (TYLENOL) 325 MG tablet    albuterol (PROAIR HFA/PROVENTIL HFA/VENTOLIN HFA) 108 (90 Base) MCG/ACT inhaler    amLODIPine (NORVASC) 5 MG tablet    aspirin (ASA) 81 MG chewable tablet    atorvastatin (LIPITOR) 80 MG tablet    ibuprofen (ADVIL/MOTRIN) 200 MG tablet    losartan (COZAAR) 50 MG tablet    naproxen sodium (ANAPROX) 220 MG tablet    nitroGLYcerin (NITROSTAT) 0.4 MG sublingual tablet    respiratory syncytial virus vaccine, bivalent (ABRYSVO) injection    tamsulosin (FLOMAX) 0.4 MG capsule    Tdap, tetanus-diptheria-acell pertussis, (BOOSTRIX) 5-2.5-18.5 LF-MCG/0.5 JARET injection    fluticasone-salmeterol (ADVAIR) 250-50 MCG/ACT inhaler     No current facility-administered medications for this visit.           Review of Systems   Constitutional, HEENT,  "cardiovascular, pulmonary, gi and gu systems are negative, except as otherwise noted.      Objective    /78 (BP Location: Right arm, Patient Position: Sitting, Cuff Size: Adult Large)   Pulse 60   Temp 97.4  F (36.3  C) (Tympanic)   Resp 16   Ht 1.88 m (6' 2\")   Wt 109.3 kg (241 lb)   SpO2 98%   BMI 30.94 kg/m    Body mass index is 30.94 kg/m .  Physical Exam   GENERAL: healthy, alert and no distress  NECK: no adenopathy, no asymmetry, masses, or scars and thyroid normal to palpation  RESP: lungs clear to auscultation - no rales, rhonchi or wheezes  CV: regular rate and rhythm, normal S1 S2, no S3 or S4, no murmur, click or rub, no peripheral edema and peripheral pulses strong  ABDOMEN: soft, nontender, no hepatosplenomegaly, no masses and bowel sounds normal  MS: no gross musculoskeletal defects noted, no edema    Office Visit on 06/20/2023   Component Date Value Ref Range Status    Sodium 06/20/2023 142  136 - 145 mmol/L Final    Potassium 06/20/2023 4.3  3.4 - 5.3 mmol/L Final    Chloride 06/20/2023 109 (H)  98 - 107 mmol/L Final    Carbon Dioxide (CO2) 06/20/2023 20 (L)  22 - 29 mmol/L Final    Anion Gap 06/20/2023 13  7 - 15 mmol/L Final    Urea Nitrogen 06/20/2023 20.4  8.0 - 23.0 mg/dL Final    Creatinine 06/20/2023 0.81  0.67 - 1.17 mg/dL Final    Calcium 06/20/2023 9.0  8.8 - 10.2 mg/dL Final    Glucose 06/20/2023 102 (H)  70 - 99 mg/dL Final    Alkaline Phosphatase 06/20/2023 82  40 - 129 U/L Final    AST 06/20/2023 34  0 - 45 U/L Final    Reference intervals for this test were updated on 6/12/2023 to more accurately reflect our healthy population. There may be differences in the flagging of prior results with similar values performed with this method. Interpretation of those prior results can be made in the context of the updated reference intervals.    ALT 06/20/2023 36  0 - 70 U/L Final    Reference intervals for this test were updated on 6/12/2023 to more accurately reflect our healthy " population. There may be differences in the flagging of prior results with similar values performed with this method. Interpretation of those prior results can be made in the context of the updated reference intervals.      Protein Total 06/20/2023 6.3 (L)  6.4 - 8.3 g/dL Final    Albumin 06/20/2023 4.3  3.5 - 5.2 g/dL Final    Bilirubin Total 06/20/2023 0.6  <=1.2 mg/dL Final    GFR Estimate 06/20/2023 >90  >60 mL/min/1.73m2 Final    Cholesterol 06/20/2023 190  <200 mg/dL Final    Triglycerides 06/20/2023 56  <150 mg/dL Final    Direct Measure HDL 06/20/2023 65  >=40 mg/dL Final    LDL Cholesterol Calculated 06/20/2023 114 (H)  <=100 mg/dL Final    Non HDL Cholesterol 06/20/2023 125  <130 mg/dL Final    WBC Count 06/20/2023 6.3  4.0 - 11.0 10e3/uL Final    RBC Count 06/20/2023 4.68  4.40 - 5.90 10e6/uL Final    Hemoglobin 06/20/2023 14.2  13.3 - 17.7 g/dL Final    Hematocrit 06/20/2023 43.0  40.0 - 53.0 % Final    MCV 06/20/2023 92  78 - 100 fL Final    MCH 06/20/2023 30.3  26.5 - 33.0 pg Final    MCHC 06/20/2023 33.0  31.5 - 36.5 g/dL Final    RDW 06/20/2023 13.0  10.0 - 15.0 % Final    Platelet Count 06/20/2023 240  150 - 450 10e3/uL Final    % Neutrophils 06/20/2023 65  % Final    % Lymphocytes 06/20/2023 24  % Final    % Monocytes 06/20/2023 7  % Final    % Eosinophils 06/20/2023 3  % Final    % Basophils 06/20/2023 1  % Final    % Immature Granulocytes 06/20/2023 0  % Final    Absolute Neutrophils 06/20/2023 4.1  1.6 - 8.3 10e3/uL Final    Absolute Lymphocytes 06/20/2023 1.5  0.8 - 5.3 10e3/uL Final    Absolute Monocytes 06/20/2023 0.4  0.0 - 1.3 10e3/uL Final    Absolute Eosinophils 06/20/2023 0.2  0.0 - 0.7 10e3/uL Final    Absolute Basophils 06/20/2023 0.0  0.0 - 0.2 10e3/uL Final    Absolute Immature Granulocytes 06/20/2023 0.0  <=0.4 10e3/uL Final

## 2024-01-18 ENCOUNTER — OFFICE VISIT (OUTPATIENT)
Dept: FAMILY MEDICINE | Facility: CLINIC | Age: 69
End: 2024-01-18
Payer: COMMERCIAL

## 2024-01-18 VITALS
HEART RATE: 67 BPM | SYSTOLIC BLOOD PRESSURE: 130 MMHG | BODY MASS INDEX: 31.47 KG/M2 | RESPIRATION RATE: 16 BRPM | HEIGHT: 74 IN | DIASTOLIC BLOOD PRESSURE: 78 MMHG | TEMPERATURE: 97.6 F | OXYGEN SATURATION: 98 % | WEIGHT: 245.2 LBS

## 2024-01-18 DIAGNOSIS — M72.2 PLANTAR FASCIITIS: Primary | ICD-10-CM

## 2024-01-18 PROCEDURE — 99213 OFFICE O/P EST LOW 20 MIN: CPT | Performed by: NURSE PRACTITIONER

## 2024-01-18 RX ORDER — RESPIRATORY SYNCYTIAL VIRUS VACCINE 120MCG/0.5
0.5 KIT INTRAMUSCULAR ONCE
Qty: 1 EACH | Refills: 0 | Status: CANCELLED | OUTPATIENT
Start: 2024-01-18 | End: 2024-01-18

## 2024-01-18 RX ORDER — ROSUVASTATIN CALCIUM 40 MG/1
40 TABLET, COATED ORAL DAILY
COMMUNITY
Start: 2023-12-08

## 2024-01-18 ASSESSMENT — ASTHMA QUESTIONNAIRES
ACT_TOTALSCORE: 20
QUESTION_5 LAST FOUR WEEKS HOW WOULD YOU RATE YOUR ASTHMA CONTROL: WELL CONTROLLED
QUESTION_4 LAST FOUR WEEKS HOW OFTEN HAVE YOU USED YOUR RESCUE INHALER OR NEBULIZER MEDICATION (SUCH AS ALBUTEROL): TWO OR THREE TIMES PER WEEK
QUESTION_3 LAST FOUR WEEKS HOW OFTEN DID YOUR ASTHMA SYMPTOMS (WHEEZING, COUGHING, SHORTNESS OF BREATH, CHEST TIGHTNESS OR PAIN) WAKE YOU UP AT NIGHT OR EARLIER THAN USUAL IN THE MORNING: NOT AT ALL
QUESTION_2 LAST FOUR WEEKS HOW OFTEN HAVE YOU HAD SHORTNESS OF BREATH: THREE TO SIX TIMES A WEEK
ACT_TOTALSCORE: 20
QUESTION_1 LAST FOUR WEEKS HOW MUCH OF THE TIME DID YOUR ASTHMA KEEP YOU FROM GETTING AS MUCH DONE AT WORK, SCHOOL OR AT HOME: NONE OF THE TIME

## 2024-01-18 ASSESSMENT — ENCOUNTER SYMPTOMS
DYSURIA: 0
NUMBNESS: 0
HEMATURIA: 0
RESPIRATORY NEGATIVE: 1
FREQUENCY: 1
CONSTITUTIONAL NEGATIVE: 1
CARDIOVASCULAR NEGATIVE: 1
GASTROINTESTINAL NEGATIVE: 1

## 2024-01-18 NOTE — COMMUNITY RESOURCES LIST (ENGLISH)
01/18/2024   [facility] Engage Resources  N/A  For questions about this resource list or additional care needs, please contact your primary care clinic or care manager.  Phone: [phone]   Email: N/A   Address: [address] [place] [id number]   Hours: N/A        Financial Stability       Utility payment assistance  1  [facility] Service Extension Unit - Hotline Distance: 3.84 miles      Phone/Virtual   [address] [place] [address]  Language: English  Hours: Mon - Sun Open 24 Hours  Fees: Free   Phone: (141) 956-7830 Website: [email or web address]     2  [facility] Home Energy Assistance Program (WHEAP) Distance: 3.84 miles      Phone/Virtual   [address] [place] [address]  Language: English, Beninese  Hours: Mon - Fri 8:00 AM - 4:30 PM  Fees: Free   Phone: (214) 584-7478 Website: [email or web address]          Important Numbers & Websites       Emergency Services   911  [facility]   311  Poison Control   (228) 761-7389  Suicide Prevention Lifeline   (413) 108-2015 (TALK)  Child Abuse Hotline   (408) 213-1192 (4-A-Child)  Sexual Assault Hotline   (640) 654-4424 (HOPE)  National Runaway Safeline   (317) 501-3667 (RUNAWAY)  All-Options Talkline   (499) 809-9389  Substance Abuse Referral   (154) 322-4463 (HELP)

## 2024-01-18 NOTE — PROGRESS NOTES
"  Assessment & Plan     Plantar fasciitis  Developed heel pain after playing cricket barefoot with his 6-year-old grandson while visiting in [place].  Also has been standing a lot on concrete.  Recommend treating for plantar fasciitis with exercises, good supportive shoes, ibuprofen sparingly as needed.  Encouraged wearing good supportive shoes or slippers in the house and not walking barefoot.  Symptoms not improving, could consider referral for steroid injection.  He may also try heel cup in the shoes.  Follow-up if symptoms not improving.            BMI  Estimated body mass index is 31.48 kg/m  as calculated from the following:    Height as of this encounter: 1.88 m (6' 2\").    Weight as of this encounter: 111.2 kg (245 lb 3.2 oz).           Jose Maria Rivas is a 68 year old, presenting for the following health issues:  Heel pain (Lt heel pain, hard to stand x 1 month)        1/18/2024    10:22 AM   Additional Questions   Roomed by BRYCE Whittaker     Rob is a very pleasant 68-year-old gentleman here for evaluation of left heel pain onset 3 weeks ago.  He visited his stepdaughter and family in [place], was playing a lot of cricket with his 6-year-old grandson.  He was barefoot a lot.  Symptoms started while he was in [place].  He is now standing a lot on concrete which could be contributing to symptoms.  States symptoms seem to worsen with rest and improved with walking.  He wears supportive shoes in general.  He has no history of foot pain or surgeries.  He has tried acetaminophen.      Med reconciliation completed.  States he has not tried tamsulosin.  He was prescribed that for urinary frequency prior to trip to [place].  Before he left, symptoms improved so he never started the medication.  However when he returned from [place] he has started to have urinary frequency again.  We discussed he could try tamsulosin but to watch for lightheadedness or dizziness.        History of Present Illness " "      Reason for visit:  Left heel hurts like the dickens    He eats 4 or more servings of fruits and vegetables daily.He consumes 0 sweetened beverage(s) daily.He exercises with enough effort to increase his heart rate 10 to 19 minutes per day.  He exercises with enough effort to increase his heart rate 6 days per week.   He is taking medications regularly.                   Objective    /78 (BP Location: Right arm, Patient Position: Sitting, Cuff Size: Adult Large)   Pulse 67   Temp 97.6  F (36.4  C) (Tympanic)   Resp 16   Ht 1.88 m (6' 2\")   Wt 111.2 kg (245 lb 3.2 oz)   SpO2 98%   BMI 31.48 kg/m    Body mass index is 31.48 kg/m .    Review of Systems   Constitutional: Negative.    HENT: Negative.     Respiratory: Negative.     Cardiovascular: Negative.    Gastrointestinal: Negative.    Genitourinary:  Positive for frequency. Negative for dysuria and hematuria.   Musculoskeletal:         Left heel pain   Neurological:  Negative for numbness.       Physical Exam  Constitutional:       Appearance: Normal appearance.   HENT:      Head: Normocephalic and atraumatic.   Cardiovascular:      Rate and Rhythm: Normal rate and regular rhythm.      Pulses: Normal pulses.   Pulmonary:      Effort: Pulmonary effort is normal.      Breath sounds: Normal breath sounds.   Musculoskeletal:         General: No tenderness (No pain on palpation of left heel.), deformity or signs of injury. Normal range of motion.      Right lower leg: No edema.      Left lower leg: No edema.   Skin:     General: Skin is warm and dry.   Neurological:      Mental Status: He is alert and oriented to person, place, and time.      Sensory: No sensory deficit.   Psychiatric:         Mood and Affect: Mood normal.         Behavior: Behavior normal.                    Signed Electronically by: TONYA Craven CNP    "

## 2024-01-29 ENCOUNTER — OFFICE VISIT (OUTPATIENT)
Dept: UROLOGY | Facility: CLINIC | Age: 69
End: 2024-01-29
Attending: PHYSICIAN ASSISTANT
Payer: COMMERCIAL

## 2024-01-29 VITALS
SYSTOLIC BLOOD PRESSURE: 127 MMHG | DIASTOLIC BLOOD PRESSURE: 73 MMHG | WEIGHT: 245 LBS | HEART RATE: 74 BPM | BODY MASS INDEX: 31.44 KG/M2 | HEIGHT: 74 IN

## 2024-01-29 DIAGNOSIS — N43.3 HYDROCELE, UNSPECIFIED HYDROCELE TYPE: ICD-10-CM

## 2024-01-29 PROCEDURE — 99204 OFFICE O/P NEW MOD 45 MIN: CPT | Performed by: STUDENT IN AN ORGANIZED HEALTH CARE EDUCATION/TRAINING PROGRAM

## 2024-01-29 RX ORDER — CEFAZOLIN SODIUM 2 G/50ML
2 SOLUTION INTRAVENOUS SEE ADMIN INSTRUCTIONS
Status: CANCELLED | OUTPATIENT
Start: 2024-01-29

## 2024-01-29 RX ORDER — CEFAZOLIN SODIUM 2 G/50ML
2 SOLUTION INTRAVENOUS
Status: CANCELLED | OUTPATIENT
Start: 2024-01-29

## 2024-01-29 ASSESSMENT — PAIN SCALES - GENERAL: PAINLEVEL: NO PAIN (0)

## 2024-01-29 NOTE — NURSING NOTE
Chief Complaint   Patient presents with    Hydrocele      Pt states hydrocele has been present for about 1.5 years on the left side. Pt states its bothersome, painful with movement    Bethany Ly CMA

## 2024-01-29 NOTE — LETTER
1/29/2024       RE: Rob Agrawal   [facility] [address]     Dear Colleague,    Thank you for referring your patient, Rob Agrawal, to the [facility] [place] at [facility]. Please see a copy of my visit note below.          Chief Complaint:    Left hydrocele           Consult or Referral:     Mr. Rob Agrawal is a 68 year old male seen at the request of Dr. Al.         History of Present Illness:     Rob Agrawal is a 68 year old male being seen for hydrocele.  Duration of problem: 2 years  Previous treatments: None     accompanied by his spouse  Reviewed previous notes from Dr. Al  Rob presents today for evaluation and possible treatment of his hydrocele on the left  Is been increasing in size for the last few months and has been affecting him  Is uncomfortable and is bothered by the hydrocele and wants to take care of it  No prior major surgical procedures  No other neurological symptoms  Takes tamsulosin for his BPH which is well-controlled           Past Medical History:   History reviewed. No pertinent past medical history.         Past Surgical History:     Past Surgical History:   Procedure Laterality Date    STENT, CORONARY, BETSY              Medications     Current Outpatient Medications   Medication    acetaminophen (TYLENOL) 325 MG tablet    albuterol (PROAIR HFA/PROVENTIL HFA/VENTOLIN HFA) 108 (90 Base) MCG/ACT inhaler    amLODIPine (NORVASC) 5 MG tablet    aspirin (ASA) 81 MG chewable tablet    losartan (COZAAR) 50 MG tablet    nitroGLYcerin (NITROSTAT) 0.4 MG sublingual tablet    rosuvastatin (CRESTOR) 40 MG tablet    fluticasone-salmeterol (ADVAIR) 250-50 MCG/ACT inhaler    ibuprofen (ADVIL/MOTRIN) 200 MG tablet    tamsulosin (FLOMAX) 0.4 MG capsule     No current facility-administered medications for this visit.            Family History:     Family History   Problem Relation Age of Onset     Myocardial Infarction Mother     Myocardial Infarction Father     Myocardial Infarction Brother             Social History:     Social History     Socioeconomic History    Marital status:      Spouse name: Not on file    Number of children: Not on file    Years of education: Not on file    Highest education level: Not on file   Occupational History    Not on file   Tobacco Use    Smoking status: Former     Types: Cigars     Passive exposure: Past    Smokeless tobacco: Never   Vaping Use    Vaping Use: Never used   Substance and Sexual Activity    Alcohol use: Not on file    Drug use: Not on file    Sexual activity: Not on file   Other Topics Concern    Not on file   Social History Narrative    Not on file     Social Determinants of Health     Financial Resource Strain: High Risk (1/18/2024)    Financial Resource Strain     Within the past 12 months, have you or your family members you live with been unable to get utilities (heat, electricity) when it was really needed?: Yes   Food Insecurity: Low Risk  (1/18/2024)    Food Insecurity     Within the past 12 months, did you worry that your food would run out before you got money to buy more?: No     Within the past 12 months, did the food you bought just not last and you didn t have money to get more?: No   Transportation Needs: Low Risk  (1/18/2024)    Transportation Needs     Within the past 12 months, has lack of transportation kept you from medical appointments, getting your medicines, non-medical meetings or appointments, work, or from getting things that you need?: No   Physical Activity: Not on file   Stress: Not on file   Social Connections: Not on file   Interpersonal Safety: Low Risk  (11/17/2023)    Interpersonal Safety     Do you feel physically and emotionally safe where you currently live?: Yes     Within the past 12 months, have you been hit, slapped, kicked or otherwise physically hurt by someone?: No     Within the past 12 months, have you  "been humiliated or emotionally abused in other ways by your partner or ex-partner?: No   Housing Stability: Low Risk  (1/18/2024)    Housing Stability     Do you have housing? : Yes     Are you worried about losing your housing?: No            Allergies:   Other environmental allergy         Review of Systems:  From intake questionnaire     Skin: negative  Eyes: negative  Ears/Nose/Throat: negative  Respiratory: No shortness of breath, dyspnea on exertion, cough, or hemoptysis  Cardiovascular: No chest pain or palpitations  Gastrointestinal: negative; no nausea/vomiting, constipation or diarrhea  Genitourinary: as per HPI  Musculoskeletal: negative  Neurologic: negative  Psychiatric: negative  Hematologic/Lymphatic/Immunologic: negative  Endocrine: negative         Physical Exam:     Patient is a 68 year old  male   Vitals: Blood pressure 127/73, pulse 74, height 1.88 m (6' 2\"), weight 111.1 kg (245 lb).  Constitutional: Body mass index is 31.46 kg/m .  Alert, no acute distress, oriented, conversant  Eyes: no scleral icterus; extraocular muscles intact, moist conjunctivae  Neck: trachea midline, no thyromegaly  Ears/nose/mouth: throat/mouth:normal, good dentition  Respiratory: no respiratory distress, or pursed lip breathing  Cardiovascular: pulses strong and intact; no obvious jugular venous distension present  Gastrointestinal: soft, nontender, no organomegaly or masses,   Lymphatics: No inguinal adenopathy  Musculoskeletal: extremities normal, no peripheral edema  Skin: no suspicious lesions or rashes  Neuro: Alert, oriented, speech and mentation normal  Psych: affect and mood normal, alert and oriented to person, place and time  Gait: Normal  : Large left hydrocele normal right testis      Labs and Pathology:    The following labs were reviewed by me and discussed with the patient:    Significant for   Lab Results   Component Value Date    CR 0.81 06/20/2023    CR 0.93 01/27/2022    CR 0.80 04/02/2021    CR " 1.10 11/20/2019    CR 0.92 03/15/2019     Prostate Specific Antigen Screen   Date Value Ref Range Status   11/17/2023 1.82 0.00 - 4.50 ng/mL Final   03/15/2022 2.15 0.00 - 4.00 ug/L Final   03/23/2017 0.7 < OR = 4.0 ng/mL Final     Comment:        This test was performed using the Siemens  chemiluminescent method. Values obtained from  different assay methods cannot be used  interchangeably. PSA levels, regardless of  value, should not be interpreted as absolute  evidence of the presence or absence of disease.     Lab test performed by:  Lab Mnemonic:[facility]  [facility]  [address] [place] [address]  Ryan Leslie M.D.               Imaging:    The following imaging exams were independently viewed and interpreted by me and discussed with patient:  Scrotal Ultrasound: Abnormal:  Moderate to large left and small right hydroceles.   2.  No evidence for testicular torsion or acute epididymoorchitis.                Assessment and Plan:     Hydrocele, unspecified hydrocele type  Discussed in detail about hydrocele, its etiology and options of treatment  Discussed with him about possible procedures including aspiration and hydrocelectomy  Talked about aftereffects of the procedure including a recurrence of about 20%  We discussed in detail about the postoperative course including increasing size of the swelling for the first few weeks after which the swelling will start to decrease.  This is typical for most hydrocele procedures  He will need to wear scrotal supporters for at least 6 to 8 weeks to help with the swelling resolution  Also discussed about possible implications and complications like hematoma, wound infection and rare need for evacuation of hematoma or infection with another surgical procedure  He expressed understanding and was agreeable to this treatment plan  I have him scheduled surgery for the same  - Adult Urology [facility] Referral  - Case Request: HYDROCELECTOMY, SCROTAL  APPROACH; Standing  - Case Request: HYDROCELECTOMY, SCROTAL APPROACH      Plan:  To schedule for hydrocelectomy    Orders  Orders Placed This Encounter   Procedures    Case Request: HYDROCELECTOMY, SCROTAL APPROACH       Armen Jeff MD  [facility] [place]      ==========================    Additional Billing and Coding Information:  Review of external notes as documented above   Review of the result(s) of each unique test - scrotal ultrasound, creatinine, PSA                15 minutes spent by me on the date of the encounter doing chart review, review of test results, interpretation of tests, patient visit, and documentation     ==========================

## 2024-02-05 NOTE — PROGRESS NOTES
Chief Complaint:    Left hydrocele           Consult or Referral:     Mr. Rob Agrawal is a 68 year old male seen at the request of Dr. Al.         History of Present Illness:     Rob Agrawal is a 68 year old male being seen for hydrocele.  Duration of problem: 2 years  Previous treatments: None     accompanied by his spouse  Reviewed previous notes from Dr. Al  Rob presents today for evaluation and possible treatment of his hydrocele on the left  Is been increasing in size for the last few months and has been affecting him  Is uncomfortable and is bothered by the hydrocele and wants to take care of it  No prior major surgical procedures  No other neurological symptoms  Takes tamsulosin for his BPH which is well-controlled           Past Medical History:   History reviewed. No pertinent past medical history.         Past Surgical History:     Past Surgical History:   Procedure Laterality Date    STENT, CORONARY, BETSY              Medications     Current Outpatient Medications   Medication    acetaminophen (TYLENOL) 325 MG tablet    albuterol (PROAIR HFA/PROVENTIL HFA/VENTOLIN HFA) 108 (90 Base) MCG/ACT inhaler    amLODIPine (NORVASC) 5 MG tablet    aspirin (ASA) 81 MG chewable tablet    losartan (COZAAR) 50 MG tablet    nitroGLYcerin (NITROSTAT) 0.4 MG sublingual tablet    rosuvastatin (CRESTOR) 40 MG tablet    fluticasone-salmeterol (ADVAIR) 250-50 MCG/ACT inhaler    ibuprofen (ADVIL/MOTRIN) 200 MG tablet    tamsulosin (FLOMAX) 0.4 MG capsule     No current facility-administered medications for this visit.            Family History:     Family History   Problem Relation Age of Onset    Myocardial Infarction Mother     Myocardial Infarction Father     Myocardial Infarction Brother             Social History:     Social History     Socioeconomic History    Marital status:      Spouse name: Not on file    Number of children: Not on file    Years of education: Not on file    Highest  education level: Not on file   Occupational History    Not on file   Tobacco Use    Smoking status: Former     Types: Cigars     Passive exposure: Past    Smokeless tobacco: Never   Vaping Use    Vaping Use: Never used   Substance and Sexual Activity    Alcohol use: Not on file    Drug use: Not on file    Sexual activity: Not on file   Other Topics Concern    Not on file   Social History Narrative    Not on file     Social Determinants of Health     Financial Resource Strain: High Risk (1/18/2024)    Financial Resource Strain     Within the past 12 months, have you or your family members you live with been unable to get utilities (heat, electricity) when it was really needed?: Yes   Food Insecurity: Low Risk  (1/18/2024)    Food Insecurity     Within the past 12 months, did you worry that your food would run out before you got money to buy more?: No     Within the past 12 months, did the food you bought just not last and you didn t have money to get more?: No   Transportation Needs: Low Risk  (1/18/2024)    Transportation Needs     Within the past 12 months, has lack of transportation kept you from medical appointments, getting your medicines, non-medical meetings or appointments, work, or from getting things that you need?: No   Physical Activity: Not on file   Stress: Not on file   Social Connections: Not on file   Interpersonal Safety: Low Risk  (11/17/2023)    Interpersonal Safety     Do you feel physically and emotionally safe where you currently live?: Yes     Within the past 12 months, have you been hit, slapped, kicked or otherwise physically hurt by someone?: No     Within the past 12 months, have you been humiliated or emotionally abused in other ways by your partner or ex-partner?: No   Housing Stability: Low Risk  (1/18/2024)    Housing Stability     Do you have housing? : Yes     Are you worried about losing your housing?: No            Allergies:   Other environmental allergy         Review of Systems:  " From intake questionnaire     Skin: negative  Eyes: negative  Ears/Nose/Throat: negative  Respiratory: No shortness of breath, dyspnea on exertion, cough, or hemoptysis  Cardiovascular: No chest pain or palpitations  Gastrointestinal: negative; no nausea/vomiting, constipation or diarrhea  Genitourinary: as per HPI  Musculoskeletal: negative  Neurologic: negative  Psychiatric: negative  Hematologic/Lymphatic/Immunologic: negative  Endocrine: negative         Physical Exam:     Patient is a 68 year old  male   Vitals: Blood pressure 127/73, pulse 74, height 1.88 m (6' 2\"), weight 111.1 kg (245 lb).  Constitutional: Body mass index is 31.46 kg/m .  Alert, no acute distress, oriented, conversant  Eyes: no scleral icterus; extraocular muscles intact, moist conjunctivae  Neck: trachea midline, no thyromegaly  Ears/nose/mouth: throat/mouth:normal, good dentition  Respiratory: no respiratory distress, or pursed lip breathing  Cardiovascular: pulses strong and intact; no obvious jugular venous distension present  Gastrointestinal: soft, nontender, no organomegaly or masses,   Lymphatics: No inguinal adenopathy  Musculoskeletal: extremities normal, no peripheral edema  Skin: no suspicious lesions or rashes  Neuro: Alert, oriented, speech and mentation normal  Psych: affect and mood normal, alert and oriented to person, place and time  Gait: Normal  : Large left hydrocele normal right testis      Labs and Pathology:    The following labs were reviewed by me and discussed with the patient:    Significant for   Lab Results   Component Value Date    CR 0.81 06/20/2023    CR 0.93 01/27/2022    CR 0.80 04/02/2021    CR 1.10 11/20/2019    CR 0.92 03/15/2019     Prostate Specific Antigen Screen   Date Value Ref Range Status   11/17/2023 1.82 0.00 - 4.50 ng/mL Final   03/15/2022 2.15 0.00 - 4.00 ug/L Final   03/23/2017 0.7 < OR = 4.0 ng/mL Final     Comment:        This test was performed using the Siemens  chemiluminescent " method. Values obtained from  different assay methods cannot be used  interchangeably. PSA levels, regardless of  value, should not be interpreted as absolute  evidence of the presence or absence of disease.     Lab test performed by:  Lab Mnemonic:CB  [facility]  [address] [place] [address]  Ryan Leslie M.D.               Imaging:    The following imaging exams were independently viewed and interpreted by me and discussed with patient:  Scrotal Ultrasound: Abnormal:  Moderate to large left and small right hydroceles.   2.  No evidence for testicular torsion or acute epididymoorchitis.                Assessment and Plan:     Hydrocele, unspecified hydrocele type  Discussed in detail about hydrocele, its etiology and options of treatment  Discussed with him about possible procedures including aspiration and hydrocelectomy  Talked about aftereffects of the procedure including a recurrence of about 20%  We discussed in detail about the postoperative course including increasing size of the swelling for the first few weeks after which the swelling will start to decrease.  This is typical for most hydrocele procedures  He will need to wear scrotal supporters for at least 6 to 8 weeks to help with the swelling resolution  Also discussed about possible implications and complications like hematoma, wound infection and rare need for evacuation of hematoma or infection with another surgical procedure  He expressed understanding and was agreeable to this treatment plan  I have him scheduled surgery for the same  - Adult Urology [facility] Referral  - Case Request: HYDROCELECTOMY, SCROTAL APPROACH; Standing  - Case Request: HYDROCELECTOMY, SCROTAL APPROACH      Plan:  To schedule for hydrocelectomy    Orders  Orders Placed This Encounter   Procedures    Case Request: HYDROCELECTOMY, SCROTAL APPROACH       Armen Jeff MD  [facility]  [place]      ==========================    Additional Billing and Coding Information:  Review of external notes as documented above   Review of the result(s) of each unique test - scrotal ultrasound, creatinine, PSA                15 minutes spent by me on the date of the encounter doing chart review, review of test results, interpretation of tests, patient visit, and documentation     ==========================

## 2024-02-12 ENCOUNTER — ANESTHESIA EVENT (OUTPATIENT)
Dept: SURGERY | Facility: AMBULATORY SURGERY CENTER | Age: 69
End: 2024-02-12
Payer: MEDICARE

## 2024-02-13 ENCOUNTER — ANESTHESIA (OUTPATIENT)
Dept: SURGERY | Facility: AMBULATORY SURGERY CENTER | Age: 69
End: 2024-02-13
Payer: MEDICARE

## 2024-02-13 ENCOUNTER — HOSPITAL ENCOUNTER (OUTPATIENT)
Facility: AMBULATORY SURGERY CENTER | Age: 69
Discharge: HOME | End: 2024-02-13
Attending: STUDENT IN AN ORGANIZED HEALTH CARE EDUCATION/TRAINING PROGRAM
Payer: MEDICARE

## 2024-02-13 VITALS
HEIGHT: 74 IN | DIASTOLIC BLOOD PRESSURE: 81 MMHG | BODY MASS INDEX: 31.44 KG/M2 | HEART RATE: 47 BPM | TEMPERATURE: 96.9 F | RESPIRATION RATE: 14 BRPM | WEIGHT: 245 LBS | OXYGEN SATURATION: 96 % | SYSTOLIC BLOOD PRESSURE: 132 MMHG

## 2024-02-13 DIAGNOSIS — N43.3 HYDROCELE, UNSPECIFIED HYDROCELE TYPE: Primary | ICD-10-CM

## 2024-02-13 RX ORDER — DEXAMETHASONE SODIUM PHOSPHATE 4 MG/ML
INJECTION, SOLUTION INTRA-ARTICULAR; INTRALESIONAL; INTRAMUSCULAR; INTRAVENOUS; SOFT TISSUE PRN
Status: DISCONTINUED | OUTPATIENT
Start: 2024-02-13 | End: 2024-02-13

## 2024-02-13 RX ORDER — IBUPROFEN 800 MG/1
800 TABLET, FILM COATED ORAL ONCE
Status: COMPLETED | OUTPATIENT
Start: 2024-02-13 | End: 2024-02-13

## 2024-02-13 RX ORDER — LIDOCAINE HYDROCHLORIDE 20 MG/ML
INJECTION, SOLUTION INFILTRATION; PERINEURAL PRN
Status: DISCONTINUED | OUTPATIENT
Start: 2024-02-13 | End: 2024-02-13

## 2024-02-13 RX ORDER — FENTANYL CITRATE 0.05 MG/ML
25 INJECTION, SOLUTION INTRAMUSCULAR; INTRAVENOUS EVERY 5 MIN PRN
Status: DISCONTINUED | OUTPATIENT
Start: 2024-02-13 | End: 2024-02-14 | Stop reason: HOSPADM

## 2024-02-13 RX ORDER — SODIUM CHLORIDE, SODIUM LACTATE, POTASSIUM CHLORIDE, CALCIUM CHLORIDE 600; 310; 30; 20 MG/100ML; MG/100ML; MG/100ML; MG/100ML
INJECTION, SOLUTION INTRAVENOUS CONTINUOUS
Status: DISCONTINUED | OUTPATIENT
Start: 2024-02-13 | End: 2024-02-14 | Stop reason: HOSPADM

## 2024-02-13 RX ORDER — FENTANYL CITRATE 50 UG/ML
INJECTION, SOLUTION INTRAMUSCULAR; INTRAVENOUS PRN
Status: DISCONTINUED | OUTPATIENT
Start: 2024-02-13 | End: 2024-02-13

## 2024-02-13 RX ORDER — ONDANSETRON 4 MG/1
4 TABLET, ORALLY DISINTEGRATING ORAL EVERY 30 MIN PRN
Status: DISCONTINUED | OUTPATIENT
Start: 2024-02-13 | End: 2024-02-14 | Stop reason: HOSPADM

## 2024-02-13 RX ORDER — LIDOCAINE 40 MG/G
CREAM TOPICAL
Status: DISCONTINUED | OUTPATIENT
Start: 2024-02-13 | End: 2024-02-14 | Stop reason: HOSPADM

## 2024-02-13 RX ORDER — ONDANSETRON 2 MG/ML
4 INJECTION INTRAMUSCULAR; INTRAVENOUS EVERY 30 MIN PRN
Status: DISCONTINUED | OUTPATIENT
Start: 2024-02-13 | End: 2024-02-14 | Stop reason: HOSPADM

## 2024-02-13 RX ORDER — GLYCOPYRROLATE 0.2 MG/ML
INJECTION, SOLUTION INTRAMUSCULAR; INTRAVENOUS PRN
Status: DISCONTINUED | OUTPATIENT
Start: 2024-02-13 | End: 2024-02-13

## 2024-02-13 RX ORDER — HYDROMORPHONE HCL IN WATER/PF 6 MG/30 ML
0.2 PATIENT CONTROLLED ANALGESIA SYRINGE INTRAVENOUS EVERY 5 MIN PRN
Status: DISCONTINUED | OUTPATIENT
Start: 2024-02-13 | End: 2024-02-14 | Stop reason: HOSPADM

## 2024-02-13 RX ORDER — CEFAZOLIN SODIUM 2 G/100ML
2 INJECTION, SOLUTION INTRAVENOUS
Status: COMPLETED | OUTPATIENT
Start: 2024-02-13 | End: 2024-02-13

## 2024-02-13 RX ORDER — EPHEDRINE SULFATE 50 MG/ML
INJECTION, SOLUTION INTRAMUSCULAR; INTRAVENOUS; SUBCUTANEOUS PRN
Status: DISCONTINUED | OUTPATIENT
Start: 2024-02-13 | End: 2024-02-13

## 2024-02-13 RX ORDER — CEFAZOLIN SODIUM 2 G/100ML
2 INJECTION, SOLUTION INTRAVENOUS SEE ADMIN INSTRUCTIONS
Status: DISCONTINUED | OUTPATIENT
Start: 2024-02-13 | End: 2024-02-14 | Stop reason: HOSPADM

## 2024-02-13 RX ORDER — BUPIVACAINE HYDROCHLORIDE 5 MG/ML
INJECTION, SOLUTION EPIDURAL; INTRACAUDAL PRN
Status: DISCONTINUED | OUTPATIENT
Start: 2024-02-13 | End: 2024-02-13 | Stop reason: HOSPADM

## 2024-02-13 RX ORDER — ACETAMINOPHEN 325 MG/1
975 TABLET ORAL ONCE
Status: COMPLETED | OUTPATIENT
Start: 2024-02-13 | End: 2024-02-13

## 2024-02-13 RX ORDER — OXYCODONE HYDROCHLORIDE 10 MG/1
10 TABLET ORAL
Status: DISCONTINUED | OUTPATIENT
Start: 2024-02-13 | End: 2024-02-14 | Stop reason: HOSPADM

## 2024-02-13 RX ORDER — ONDANSETRON 2 MG/ML
INJECTION INTRAMUSCULAR; INTRAVENOUS PRN
Status: DISCONTINUED | OUTPATIENT
Start: 2024-02-13 | End: 2024-02-13

## 2024-02-13 RX ORDER — NEOMYCIN/BACITRACIN/POLYMYXINB 3.5-400-5K
OINTMENT (GRAM) TOPICAL PRN
Status: DISCONTINUED | OUTPATIENT
Start: 2024-02-13 | End: 2024-02-13 | Stop reason: HOSPADM

## 2024-02-13 RX ORDER — OXYCODONE HYDROCHLORIDE 5 MG/1
5 TABLET ORAL
Status: DISCONTINUED | OUTPATIENT
Start: 2024-02-13 | End: 2024-02-14 | Stop reason: HOSPADM

## 2024-02-13 RX ORDER — ACETAMINOPHEN 325 MG/1
650 TABLET ORAL EVERY 4 HOURS PRN
Qty: 50 TABLET | Refills: 0 | Status: SHIPPED | OUTPATIENT
Start: 2024-02-13

## 2024-02-13 RX ORDER — PROPOFOL 10 MG/ML
INJECTION, EMULSION INTRAVENOUS CONTINUOUS PRN
Status: DISCONTINUED | OUTPATIENT
Start: 2024-02-13 | End: 2024-02-13

## 2024-02-13 RX ORDER — PROPOFOL 10 MG/ML
INJECTION, EMULSION INTRAVENOUS PRN
Status: DISCONTINUED | OUTPATIENT
Start: 2024-02-13 | End: 2024-02-13

## 2024-02-13 RX ORDER — FENTANYL CITRATE 0.05 MG/ML
50 INJECTION, SOLUTION INTRAMUSCULAR; INTRAVENOUS EVERY 5 MIN PRN
Status: DISCONTINUED | OUTPATIENT
Start: 2024-02-13 | End: 2024-02-14 | Stop reason: HOSPADM

## 2024-02-13 RX ORDER — HYDROMORPHONE HCL IN WATER/PF 6 MG/30 ML
0.4 PATIENT CONTROLLED ANALGESIA SYRINGE INTRAVENOUS EVERY 5 MIN PRN
Status: DISCONTINUED | OUTPATIENT
Start: 2024-02-13 | End: 2024-02-14 | Stop reason: HOSPADM

## 2024-02-13 RX ADMIN — GLYCOPYRROLATE 0.2 MG: 0.2 INJECTION, SOLUTION INTRAMUSCULAR; INTRAVENOUS at 09:49

## 2024-02-13 RX ADMIN — CEFAZOLIN SODIUM 2 G: 2 INJECTION, SOLUTION INTRAVENOUS at 09:42

## 2024-02-13 RX ADMIN — PROPOFOL 200 MG: 10 INJECTION, EMULSION INTRAVENOUS at 09:49

## 2024-02-13 RX ADMIN — FENTANYL CITRATE 100 MCG: 50 INJECTION, SOLUTION INTRAMUSCULAR; INTRAVENOUS at 09:49

## 2024-02-13 RX ADMIN — PROPOFOL 200 MCG/KG/MIN: 10 INJECTION, EMULSION INTRAVENOUS at 09:49

## 2024-02-13 RX ADMIN — LIDOCAINE HYDROCHLORIDE 3 ML: 20 INJECTION, SOLUTION INFILTRATION; PERINEURAL at 09:49

## 2024-02-13 RX ADMIN — SODIUM CHLORIDE, SODIUM LACTATE, POTASSIUM CHLORIDE, CALCIUM CHLORIDE: 600; 310; 30; 20 INJECTION, SOLUTION INTRAVENOUS at 09:14

## 2024-02-13 RX ADMIN — Medication 100 MCG: at 10:01

## 2024-02-13 RX ADMIN — ONDANSETRON 8 MG: 2 INJECTION INTRAMUSCULAR; INTRAVENOUS at 09:49

## 2024-02-13 RX ADMIN — DEXAMETHASONE SODIUM PHOSPHATE 8 MG: 4 INJECTION, SOLUTION INTRA-ARTICULAR; INTRALESIONAL; INTRAMUSCULAR; INTRAVENOUS; SOFT TISSUE at 09:49

## 2024-02-13 RX ADMIN — Medication 100 MCG: at 09:56

## 2024-02-13 RX ADMIN — IBUPROFEN 800 MG: 800 TABLET, FILM COATED ORAL at 12:26

## 2024-02-13 RX ADMIN — ACETAMINOPHEN 975 MG: 325 TABLET ORAL at 08:56

## 2024-02-13 RX ADMIN — EPHEDRINE SULFATE 10 MG: 50 INJECTION, SOLUTION INTRAMUSCULAR; INTRAVENOUS; SUBCUTANEOUS at 10:01

## 2024-02-13 NOTE — ANESTHESIA POSTPROCEDURE EVALUATION
Patient: Rob Agrawal    Procedure: Procedure(s):  HYDROCELECTOMY, SCROTAL APPROACH       Anesthesia Type:  General    Note:  Disposition: Outpatient   Postop Pain Control: Uneventful            Sign Out: Well controlled pain   PONV: No   Neuro/Psych: Uneventful            Sign Out: Acceptable/Baseline neuro status   Airway/Respiratory: Uneventful            Sign Out: Acceptable/Baseline resp. status   CV/Hemodynamics: Uneventful            Sign Out: Acceptable CV status; No obvious hypovolemia; No obvious fluid overload   Other NRE:    DID A NON-ROUTINE EVENT OCCUR?            Last vitals:  Vitals Value Taken Time   /70 02/13/24 1045   Temp 96.8  F (36  C) 02/13/24 1045   Pulse 60 02/13/24 1047   Resp 16 02/13/24 1045   SpO2 93 % 02/13/24 1047   Vitals shown include unfiled device data.    Electronically Signed By: Christopher Miranda MD  February 13, 2024  11:41 AM

## 2024-02-13 NOTE — ANESTHESIA CARE TRANSFER NOTE
Patient: Rob Agrawal    Procedure: Procedure(s):  HYDROCELECTOMY, SCROTAL APPROACH       Diagnosis: Hydrocele, unspecified hydrocele type [N43.3]  Diagnosis Additional Information: No value filed.    Anesthesia Type:   General     Note:    Oropharynx: oropharynx clear of all foreign objects and spontaneously breathing  Level of Consciousness: awake  Oxygen Supplementation: face mask  Level of Supplemental Oxygen (L/min / FiO2): 8  Independent Airway: airway patency satisfactory and stable  Dentition: dentition unchanged  Vital Signs Stable: post-procedure vital signs reviewed and stable  Report to RN Given: handoff report given  Patient transferred to: PACU    Handoff Report: Identifed the Patient, Identified the Reponsible Provider, Reviewed the pertinent medical history, Discussed the surgical course, Reviewed Intra-OP anesthesia mangement and issues during anesthesia, Set expectations for post-procedure period and Allowed opportunity for questions and acknowledgement of understanding      Vitals:  Vitals Value Taken Time   /75 02/13/24 1034   Temp 96.8  F (36  C) 02/13/24 1034   Pulse 70 02/13/24 1034   Resp 14 02/13/24 1034   SpO2 99 % 02/13/24 1034       Electronically Signed By: TONYA Cuellar CRNA  February 13, 2024  10:37 AM

## 2024-02-13 NOTE — ANESTHESIA PROCEDURE NOTES
Airway       Patient location during procedure: OR       Procedure Start/Stop Times: 2/13/2024 9:54 AM  Staff -        Anesthesiologist:  Christopher Miranda MD       CRNA: Polly Frederick APRN CRNA       Performed By: CRNA  Consent for Airway        Urgency: elective  Indications and Patient Condition       Indications for airway management: zander-procedural       Induction type:intravenous       Mask difficulty assessment: 1 - vent by mask    Final Airway Details       Final airway type: supraglottic airway    Supraglottic Airway Details        Type: LMA       Brand: Ambu AuraGain       LMA size: 5    Post intubation assessment        Placement verified by: capnometry, equal breath sounds and chest rise        Number of attempts at approach: 1       Number of other approaches attempted: 0       Secured with: tape       Ease of procedure: easy       Dentition: Intact and Unchanged    Medication(s) Administered   Medication Administration Time: 2/13/2024 9:54 AM

## 2024-02-13 NOTE — H&P
Urology H and P Update    I have evaluated Rob Agrawal  today and examined him. He does not note any significant issues since her last evaluation. Based on my evaluation, he is fit to proceed ahead with the planned procedure.    Armen Jeff MD

## 2024-02-13 NOTE — DISCHARGE INSTRUCTIONS
You have received 975 mg of Acetaminophen (Tylenol) at 9:00 am. Please do not take an additional dose of Tylenol until after 3:00 pm     Do not exceed 4,000 mg of acetaminophen during a 24 hour period and keep in mind that acetaminophen can also be found in many over-the-counter cold medications as well as narcotics that may be given for pain.     If you have any questions or concerns regarding your procedure, please contact Dr. Jeff, his office number is [phone].        [facility]   Adult Discharge Orders & Instructions     For 24 hours after surgery    Get plenty of rest.  A responsible adult must stay with you for at least 24 hours after you leave the hospital.   Do not drive or use heavy equipment.  If you have weakness or tingling, don't drive or use heavy equipment until this feeling goes away.  Do not drink alcohol.  Avoid strenuous or risky activities.  Ask for help when climbing stairs.   You may feel lightheaded.  IF so, sit for a few minutes before standing.  Have someone help you get up.   If you have nausea (feel sick to your stomach): Drink only clear liquids such as apple juice, ginger ale, broth or 7-Up.  Rest may also help.  Be sure to drink enough fluids.  Move to a regular diet as you feel able.  You may have a slight fever. Call the doctor if your fever is over 100 F (37.7 C) (taken under the tongue) or lasts longer than 24 hours.  You may have a dry mouth, a sore throat, muscle aches or trouble sleeping.  These should go away after 24 hours.  Do not make important or legal decisions.   Call your doctor for any of the followin.  Signs of infection (fever, growing tenderness at the surgery site, a large amount of drainage or bleeding, severe pain, foul-smelling drainage, redness, swelling).    2. It has been over 8 to 10 hours since surgery and you are still not able to urinate (pass water).    3.  Headache for over 24 hours.

## 2024-02-13 NOTE — OP NOTE
OPERATIVE NOTE     PREOPERATIVE DIAGNOSIS: Left hydrocele.  POSTOPERATIVE DIAGNOSIS: same    PROCEDURES PERFORMED:   1.  Left hydrocelectomy    STAFF SURGEON: Dr. Armen Jeff, present for entire case.   ANESTHESIA: GET  ESTIMATED BLOOD LOSS: 5 mL.   IV FLUIDS: see anesthesia record  COMPLICATIONS: None.   SPECIMEN:   left hydrocele sac.    SIGNIFICANT FINDINGS:   Left hydrocele with 150 mL clear zi  fluid drained from hydrocele sac     BRIEF OPERATIVE INDICATIONS: Rob Agrawal is a 68 year old male  with history of hydrocele. Patient presented for evaluation of persistent symptomatic hydrocele. After discussion of all risks, benefits and alternatives, the patient elected to proceed with the procedure as listed above     Procedure in Detail: After informed consent was obtained, the patient was taken to the operating room and placed supine. Pneumoboots were applied, preoperative antibiotics were administered IV, and the genitals were prepped and draped in the supine position. A timeout was performed with the operating room staff.     The case was begun by marking a horizontal incision over the left hydrocele in the midportion. The skin incised sharply and was opened full-thickness exposing the left testicle and tunica albuginea. The tunica vaginalis was then opened and the hydrocele was drained with ~150 ml of fluid drained.  A large window was taken out of this hydrocele and the cut edges of the sack were oversown with a running intermittently locking 3-0 Vicryl and the excised sac was sent for pathology.  Hemostatis was achieved and the incision was closed in layers. The dartos muscle was re-approximated using a running 3-0 Vicryl. Skin is closed using 4-0 monocryl in a running subcuticular fashion.     Bacitracin, fluffs, and a supporter were applied. The patient was awoken from anesthesia without complication and transported to recovery in stable condition.        Armen Jeff MD  Urology Staff

## 2024-02-13 NOTE — ANESTHESIA PREPROCEDURE EVALUATION
Anesthesia Pre-Procedure Evaluation    Patient: Rob Agrawal   MRN: [id number] : 1955        Procedure : Procedure(s):  HYDROCELECTOMY, SCROTAL APPROACH          Past Medical History:   Diagnosis Date    Coronary artery disease     Dyspnea on exertion     Gastroesophageal reflux disease     Heart attack (H)     Hypertension     Stented coronary artery     Uncomplicated asthma       Past Surgical History:   Procedure Laterality Date    HIP SURGERY Right     ?    STENT, CORONARY, BETSY        Allergies   Allergen Reactions    Other Environmental Allergy Shortness Of Breath     Cat dander      Social History     Tobacco Use    Smoking status: Former     Types: Cigars     Passive exposure: Past    Smokeless tobacco: Never   Substance Use Topics    Alcohol use: Not Currently     Comment: quit last week      Wt Readings from Last 1 Encounters:   24 111.1 kg (245 lb)        Anesthesia Evaluation            ROS/MED HX  ENT/Pulmonary:     (+)                     Intermittent, asthma                  Neurologic:  - neg neurologic ROS     Cardiovascular: Comment:  1. Normal left ventricular systolic function. Calculated left ventricular ejection fraction   (modified Golden technique) is 60 %.    2. No regional wall motion abnormalities.    3. Normal left ventricular diastolic function.    4. Normal right ventricular chamber size. Normal right ventricular systolic function.    5. No significant valvular heart disease.    6. Mild aortic sinus of Valsalva dilatation (4.2 cm). Ascending aorta was not well visualized.       (+) Dyslipidemia hypertension- -  CAD - past MI - stent (LAD)-           MAE.                           METS/Exercise Tolerance: >4 METS    Hematologic:  - neg hematologic  ROS     Musculoskeletal:  - neg musculoskeletal ROS     GI/Hepatic:     (+) GERD, Asymptomatic on medication,                  Renal/Genitourinary:  - neg Renal ROS     Endo:     (+)               Obesity,      "  Psychiatric/Substance Use:  - neg psychiatric ROS     Infectious Disease:  - neg infectious disease ROS     Malignancy:  - neg malignancy ROS     Other:  - neg other ROS             OUTSIDE LABS:  CBC:   Lab Results   Component Value Date    WBC 6.3 06/20/2023    WBC 8.3 01/27/2022    HGB 14.2 06/20/2023    HGB 14.7 01/27/2022    HCT 43.0 06/20/2023    HCT 43.1 01/27/2022     06/20/2023     01/27/2022     BMP:   Lab Results   Component Value Date     06/20/2023     01/27/2022    POTASSIUM 4.3 06/20/2023    POTASSIUM 4.4 01/27/2022    CHLORIDE 109 (H) 06/20/2023    CHLORIDE 104 01/27/2022    CO2 20 (L) 06/20/2023    CO2 27 01/27/2022    BUN 20.4 06/20/2023    BUN 17 01/27/2022    CR 0.81 06/20/2023    CR 0.93 01/27/2022     (H) 06/20/2023    GLC 87 01/27/2022     COAGS: No results found for: \"PTT\", \"INR\", \"FIBR\"  POC: No results found for: \"BGM\", \"HCG\", \"HCGS\"  HEPATIC:   Lab Results   Component Value Date    ALBUMIN 4.3 06/20/2023    PROTTOTAL 6.3 (L) 06/20/2023    ALT 36 06/20/2023    AST 34 06/20/2023    ALKPHOS 82 06/20/2023    BILITOTAL 0.6 06/20/2023     OTHER:   Lab Results   Component Value Date    NOLBERTO 9.0 06/20/2023       Anesthesia Plan    ASA Status:  3       Anesthesia Type: General.     - Airway: LMA   Induction: Intravenous, Propofol.   Maintenance: TIVA.        Consents    Anesthesia Plan(s) and associated risks, benefits, and realistic alternatives discussed. Questions answered and patient/representative(s) expressed understanding.     - Discussed:     - Discussed with:  Patient      - Extended Intubation/Ventilatory Support Discussed: No.      - Patient is DNR/DNI Status: No     Use of blood products discussed: No .     Postoperative Care    Pain management: IV analgesics, Oral pain medications.   PONV prophylaxis: Ondansetron (or other 5HT-3), Dexamethasone or Solumedrol     Comments:    Other Comments: GALMA  Antiemetics - zofran 4mg/decadron 8mg (4mg if patient " "is diabetic)  Toradol at the end of the case if okay with the surgeon            Christopher Miranda MD    I have reviewed the pertinent notes and labs in the chart from the past 30 days and (re)examined the patient.  Any updates or changes from those notes are reflected in this note.             # Drug Induced Platelet Defect: home medication list includes an antiplatelet medication  # Obesity: Estimated body mass index is 31.46 kg/m  as calculated from the following:    Height as of 1/29/24: 1.88 m (6' 2\").    Weight as of 1/29/24: 111.1 kg (245 lb).      "

## 2024-02-29 ENCOUNTER — OFFICE VISIT (OUTPATIENT)
Dept: UROLOGY | Facility: CLINIC | Age: 69
End: 2024-02-29
Payer: COMMERCIAL

## 2024-02-29 VITALS
HEIGHT: 74 IN | SYSTOLIC BLOOD PRESSURE: 128 MMHG | WEIGHT: 245 LBS | DIASTOLIC BLOOD PRESSURE: 78 MMHG | BODY MASS INDEX: 31.44 KG/M2

## 2024-02-29 DIAGNOSIS — N43.3 HYDROCELE, UNSPECIFIED HYDROCELE TYPE: Primary | ICD-10-CM

## 2024-02-29 PROCEDURE — 99024 POSTOP FOLLOW-UP VISIT: CPT | Performed by: STUDENT IN AN ORGANIZED HEALTH CARE EDUCATION/TRAINING PROGRAM

## 2024-02-29 NOTE — PROGRESS NOTES
"CHIEF COMPLAINT   It was my pleasure to see Rob Agrawal who is a 68 year old male for follow-up of hydrocelectomy 2 weeks ago.      HPI:  Rob Agrawal is a 68 year old male being seen for follow-up and wound.  Duration of problem: 2 weeks  Previous treatments: Hydrocelectomy      Reviewed previous notes  Doing well from surgery  Has some residual swelling present  A tough couple of weeks for him because of the pain and discomfort    Exam:  /78   Ht 1.88 m (6' 2\")   Wt 111.1 kg (245 lb)   BMI 31.46 kg/m    General: age-appropriate appearing male in NAD sitting in an exam chair  Resp: no respiratory distress  CV: heart rate regular  Abdomen: Degree of obesity is mild. Abdomen is soft and nontender. No organomegaly.   :  Postprocedural inflammation and swelling of left testicle without any significant concerns normal right testicle and penis  Neuro: grossly non focal. Normal reflexes  Motor: excellent strength throughout    Review of Imaging:  The following imaging exams were independently viewed and interpreted by me and discussed with patient:      Review of Labs:  The following labs were reviewed by me and discussed with the patient:  Surgical pathology: Normal    Assessment & Plan     Hydrocele, unspecified hydrocele type  Healing well, some residual postoperative swelling present  Recommend continue scrotal support for 6 weeks  Supportive briefs to be worn for 6 months  Follow-up after 6 months if symptoms not subsided      Armen Jeff MD  [facility] [place]      ==========================    Additional Billing and Coding Information:  Review of external notes as documented above   Review of the result(s) of each unique test - surgical path                10 minutes spent by me on the date of the encounter doing chart review, review of test results, interpretation of tests, patient visit, and documentation     ==========================  "

## 2024-02-29 NOTE — NURSING NOTE
"Chief Complaint   Patient presents with    Surgical Followup     Pt here for post op follow up      Pt states testicle is healing but right testicle seems to be getting pushed up, pt states shaft of penis is still \"inserted\"    Bethany Ly, CMA   "

## 2024-02-29 NOTE — LETTER
"2/29/2024       RE: Rob Agrawal   [facility] [address]     Dear Colleague,    Thank you for referring your patient, Rob Agrawal, to the [facility] [place] at [facility]. Please see a copy of my visit note below.    CHIEF COMPLAINT   It was my pleasure to see Rob Agrawal who is a 68 year old male for follow-up of hydrocelectomy 2 weeks ago.      HPI:  Rob Agrawal is a 68 year old male being seen for follow-up and wound.  Duration of problem: 2 weeks  Previous treatments: Hydrocelectomy      Reviewed previous notes  Doing well from surgery  Has some residual swelling present  A tough couple of weeks for him because of the pain and discomfort    Exam:  /78   Ht 1.88 m (6' 2\")   Wt 111.1 kg (245 lb)   BMI 31.46 kg/m    General: age-appropriate appearing male in NAD sitting in an exam chair  Resp: no respiratory distress  CV: heart rate regular  Abdomen: Degree of obesity is mild. Abdomen is soft and nontender. No organomegaly.   :  Postprocedural inflammation and swelling of left testicle without any significant concerns normal right testicle and penis  Neuro: grossly non focal. Normal reflexes  Motor: excellent strength throughout    Review of Imaging:  The following imaging exams were independently viewed and interpreted by me and discussed with patient:      Review of Labs:  The following labs were reviewed by me and discussed with the patient:  Surgical pathology: Normal    Assessment & Plan    Hydrocele, unspecified hydrocele type  Healing well, some residual postoperative swelling present  Recommend continue scrotal support for 6 weeks  Supportive briefs to be worn for 6 months  Follow-up after 6 months if symptoms not subsided      Armen Jeff MD  [facility] [place]      ==========================    Additional Billing and Coding Information:  Review of external notes as " documented above   Review of the result(s) of each unique test - surgical path                10 minutes spent by me on the date of the encounter doing chart review, review of test results, interpretation of tests, patient visit, and documentation     ==========================

## 2024-04-10 DIAGNOSIS — I10 PRIMARY HYPERTENSION: ICD-10-CM

## 2024-04-10 RX ORDER — LOSARTAN POTASSIUM 50 MG/1
50 TABLET ORAL DAILY
Qty: 90 TABLET | Refills: 1 | Status: SHIPPED | OUTPATIENT
Start: 2024-04-10

## 2024-05-10 DIAGNOSIS — J45.909 UNCOMPLICATED ASTHMA, UNSPECIFIED ASTHMA SEVERITY, UNSPECIFIED WHETHER PERSISTENT: Primary | ICD-10-CM

## 2024-05-13 ENCOUNTER — TELEPHONE (OUTPATIENT)
Dept: FAMILY MEDICINE | Facility: CLINIC | Age: 69
End: 2024-05-13
Payer: COMMERCIAL

## 2024-05-13 DIAGNOSIS — I25.10 ATHEROSCLEROSIS OF CORONARY ARTERY OF NATIVE HEART WITHOUT ANGINA PECTORIS, UNSPECIFIED VESSEL OR LESION TYPE: Primary | ICD-10-CM

## 2024-05-13 RX ORDER — FLUTICASONE PROPIONATE AND SALMETEROL 250; 50 UG/1; UG/1
1 POWDER RESPIRATORY (INHALATION) EVERY 12 HOURS
Qty: 180 EACH | Refills: 0 | Status: SHIPPED | OUTPATIENT
Start: 2024-05-13

## 2024-05-13 NOTE — TELEPHONE ENCOUNTER
General Call    Contacts         Type Contact Phone/Fax    05/13/2024 08:32 AM CDT Phone (Incoming) Rob Agrawal (Self) [phone] (M)          Reason for Call:  medication question    What are your questions or concerns: Pt called and stated he has 2 refills left on his atorvastatin but the pharmacy tells him that PCP cancelled his  Rx.  Pt is wondering why PCP cancelled his Atorvastatin? Atorvastatin is not on PT's current medication list.    atorvastatin (LIPITOR) 80 MG tablet (Discontinued) 90 tablet 2 11/17/2023 1/18/2024 No   Sig - Route: Take 1 tablet (80 mg) by mouth daily - Oral   Patient not taking: Reported on 1/18/2024     Date of last appointment with provider: 11/17/2023    Okay to leave a detailed message?: Yes at Cell number on file:    Telephone Information:   Mobile [phone]     Soco Bird

## 2024-05-13 NOTE — TELEPHONE ENCOUNTER
RN called to speak with patient.  Patient reports that he has consistently taking his Atorvastatin.    Please send new RX to pharmacy.  (Unable to pend as has been removed from Med List).

## 2024-05-14 RX ORDER — ATORVASTATIN CALCIUM 80 MG/1
80 TABLET, FILM COATED ORAL DAILY
Qty: 90 TABLET | Refills: 3 | Status: SHIPPED | OUTPATIENT
Start: 2024-05-14

## 2024-10-15 ENCOUNTER — OFFICE VISIT (OUTPATIENT)
Dept: FAMILY MEDICINE | Facility: CLINIC | Age: 69
End: 2024-10-15
Payer: COMMERCIAL

## 2024-10-15 VITALS
BODY MASS INDEX: 29.91 KG/M2 | WEIGHT: 233.1 LBS | SYSTOLIC BLOOD PRESSURE: 108 MMHG | HEIGHT: 74 IN | OXYGEN SATURATION: 97 % | DIASTOLIC BLOOD PRESSURE: 72 MMHG | HEART RATE: 75 BPM | RESPIRATION RATE: 14 BRPM | TEMPERATURE: 98 F

## 2024-10-15 DIAGNOSIS — B35.6 TINEA CRURIS: ICD-10-CM

## 2024-10-15 DIAGNOSIS — B35.9 RINGWORM: Primary | ICD-10-CM

## 2024-10-15 DIAGNOSIS — Z29.11 NEED FOR VACCINATION AGAINST RESPIRATORY SYNCYTIAL VIRUS: ICD-10-CM

## 2024-10-15 DIAGNOSIS — I10 PRIMARY HYPERTENSION: ICD-10-CM

## 2024-10-15 DIAGNOSIS — Z23 NEED FOR TDAP VACCINATION: ICD-10-CM

## 2024-10-15 DIAGNOSIS — J45.909 UNCOMPLICATED ASTHMA, UNSPECIFIED ASTHMA SEVERITY, UNSPECIFIED WHETHER PERSISTENT: ICD-10-CM

## 2024-10-15 DIAGNOSIS — E78.5 ELEVATED LIPIDS: ICD-10-CM

## 2024-10-15 LAB
ANION GAP SERPL CALCULATED.3IONS-SCNC: 11 MMOL/L (ref 7–15)
BUN SERPL-MCNC: 22.6 MG/DL (ref 8–23)
CALCIUM SERPL-MCNC: 9.4 MG/DL (ref 8.8–10.4)
CHLORIDE SERPL-SCNC: 106 MMOL/L (ref 98–107)
CHOLEST SERPL-MCNC: 189 MG/DL
CREAT SERPL-MCNC: 1 MG/DL (ref 0.67–1.17)
EGFRCR SERPLBLD CKD-EPI 2021: 82 ML/MIN/1.73M2
FASTING STATUS PATIENT QL REPORTED: NO
FASTING STATUS PATIENT QL REPORTED: NO
GLUCOSE SERPL-MCNC: 90 MG/DL (ref 70–99)
HCO3 SERPL-SCNC: 22 MMOL/L (ref 22–29)
HDLC SERPL-MCNC: 53 MG/DL
LDLC SERPL CALC-MCNC: 121 MG/DL
NONHDLC SERPL-MCNC: 136 MG/DL
POTASSIUM SERPL-SCNC: 4.2 MMOL/L (ref 3.4–5.3)
SODIUM SERPL-SCNC: 139 MMOL/L (ref 135–145)
TRIGL SERPL-MCNC: 76 MG/DL

## 2024-10-15 PROCEDURE — 36415 COLL VENOUS BLD VENIPUNCTURE: CPT | Performed by: FAMILY MEDICINE

## 2024-10-15 PROCEDURE — 99213 OFFICE O/P EST LOW 20 MIN: CPT | Performed by: FAMILY MEDICINE

## 2024-10-15 PROCEDURE — 80048 BASIC METABOLIC PNL TOTAL CA: CPT | Performed by: FAMILY MEDICINE

## 2024-10-15 PROCEDURE — 80061 LIPID PANEL: CPT | Performed by: FAMILY MEDICINE

## 2024-10-15 RX ORDER — LOSARTAN POTASSIUM 50 MG/1
50 TABLET ORAL DAILY
Qty: 90 TABLET | Refills: 1 | Status: SHIPPED | OUTPATIENT
Start: 2024-10-15

## 2024-10-15 RX ORDER — FLUTICASONE PROPIONATE AND SALMETEROL 250; 50 UG/1; UG/1
1 POWDER RESPIRATORY (INHALATION) EVERY 12 HOURS
Qty: 180 EACH | Refills: 0 | Status: SHIPPED | OUTPATIENT
Start: 2024-10-15

## 2024-10-15 RX ORDER — ROSUVASTATIN CALCIUM 40 MG/1
40 TABLET, COATED ORAL DAILY
Qty: 90 TABLET | Refills: 3 | Status: SHIPPED | OUTPATIENT
Start: 2024-10-15

## 2024-10-15 RX ORDER — ALBUTEROL SULFATE 90 UG/1
2 INHALANT RESPIRATORY (INHALATION) EVERY 4 HOURS PRN
Qty: 18 G | Refills: 11 | Status: SHIPPED | OUTPATIENT
Start: 2024-10-15

## 2024-10-15 RX ORDER — KETOCONAZOLE 20 MG/G
CREAM TOPICAL DAILY
Qty: 60 G | Refills: 5 | Status: SHIPPED | OUTPATIENT
Start: 2024-10-15

## 2024-10-15 RX ORDER — AMLODIPINE BESYLATE 5 MG/1
5 TABLET ORAL DAILY
Qty: 90 TABLET | Refills: 3 | Status: SHIPPED | OUTPATIENT
Start: 2024-10-15

## 2024-10-15 ASSESSMENT — ASTHMA QUESTIONNAIRES
QUESTION_3 LAST FOUR WEEKS HOW OFTEN DID YOUR ASTHMA SYMPTOMS (WHEEZING, COUGHING, SHORTNESS OF BREATH, CHEST TIGHTNESS OR PAIN) WAKE YOU UP AT NIGHT OR EARLIER THAN USUAL IN THE MORNING: NOT AT ALL
ACT_TOTALSCORE: 22
QUESTION_4 LAST FOUR WEEKS HOW OFTEN HAVE YOU USED YOUR RESCUE INHALER OR NEBULIZER MEDICATION (SUCH AS ALBUTEROL): ONCE A WEEK OR LESS
QUESTION_5 LAST FOUR WEEKS HOW WOULD YOU RATE YOUR ASTHMA CONTROL: WELL CONTROLLED
QUESTION_2 LAST FOUR WEEKS HOW OFTEN HAVE YOU HAD SHORTNESS OF BREATH: ONCE OR TWICE A WEEK
ACT_TOTALSCORE: 22
QUESTION_1 LAST FOUR WEEKS HOW MUCH OF THE TIME DID YOUR ASTHMA KEEP YOU FROM GETTING AS MUCH DONE AT WORK, SCHOOL OR AT HOME: NONE OF THE TIME

## 2024-10-15 NOTE — PROGRESS NOTES
"  Assessment & Plan     Ringworm  Tinea of left hand will look at Nizoral cream twice daily for the next month if is not improving over the next we consider oral agents.  Skin care reviewed.    Primary hypertension  Well-controlled with meds as below  - Basic metabolic panel; Future  - losartan (COZAAR) 50 MG tablet; Take 1 tablet (50 mg) by mouth daily.  - amLODIPine (NORVASC) 5 MG tablet; Take 1 tablet (5 mg) by mouth daily.  - Basic metabolic panel    Elevated lipids  Well-controlled with Crestor  - Lipid panel reflex to direct LDL Fasting; Future  - rosuvastatin (CRESTOR) 40 MG tablet; Take 1 tablet (40 mg) by mouth daily.  - Lipid panel reflex to direct LDL Fasting    Uncomplicated asthma, unspecified asthma severity, unspecified whether persistent  Well-controlled with rare use of albuterol  - fluticasone-salmeterol (ADVAIR) 250-50 MCG/ACT inhaler; Inhale 1 puff into the lungs every 12 hours.  - albuterol (PROAIR HFA/PROVENTIL HFA/VENTOLIN HFA) 108 (90 Base) MCG/ACT inhaler; Inhale 2 puffs into the lungs every 4 hours as needed.              BMI  Estimated body mass index is 29.93 kg/m  as calculated from the following:    Height as of this encounter: 1.88 m (6' 2\").    Weight as of this encounter: 105.7 kg (233 lb 1.6 oz).             Jose Maria Rivas is a 69 year old, presenting for the following health issues: Overall doing well.  He notes a rash in his left hand for the last week.  No injuries.  He is getting bigger.  Otherwise doing well with his inhaler.  Taking his meds as directed.  Continues to see cardiology  Derm Problem ([place] on hand Lt hand x 1 week)        10/15/2024     3:42 PM   Additional Questions   Roomed by BRYCE Whittaker     History of Present Illness       Reason for visit:  Some sort of rash on back of left hand   He is taking medications regularly.       Patient Active Problem List   Diagnosis    History of non-ST elevation myocardial infarction (NSTEMI)    Hyperlipidemia    " Elevated lipids    Obesity    Primary hypertension    Coronary atherosclerosis    Asthma    Allergic rhinitis    Osteoarthritis of right hip    Hydrocele, unspecified hydrocele type     Current Outpatient Medications   Medication Sig Dispense Refill    acetaminophen (TYLENOL) 325 MG tablet Take 2 tablets (650 mg) by mouth every 4 hours as needed for mild pain 50 tablet 0    acetaminophen (TYLENOL) 325 MG tablet Take 650 mg by mouth every 4 hours as needed      albuterol (PROAIR HFA/PROVENTIL HFA/VENTOLIN HFA) 108 (90 Base) MCG/ACT inhaler Inhale 2 puffs into the lungs every 4 hours as needed. 18 g 11    amLODIPine (NORVASC) 5 MG tablet Take 1 tablet (5 mg) by mouth daily. 90 tablet 3    aspirin (ASA) 81 MG chewable tablet Take 81 mg by mouth daily      fluticasone-salmeterol (ADVAIR) 250-50 MCG/ACT inhaler Inhale 1 puff into the lungs every 12 hours. 180 each 0    ibuprofen (ADVIL/MOTRIN) 200 MG tablet Take 400 mg by mouth every 6 hours as needed      ketoconazole (NIZORAL) 2 % external cream Apply topically daily. 60 g 5    losartan (COZAAR) 50 MG tablet Take 1 tablet (50 mg) by mouth daily. 90 tablet 1    nitroGLYcerin (NITROSTAT) 0.4 MG sublingual tablet PLACE 1 TABLET BY MOUTH UNDER THE TONGUE EVERY 5 MINUTES AS NEEDED FOR CHEST PAIN 30 tablet 1    rosuvastatin (CRESTOR) 40 MG tablet Take 1 tablet (40 mg) by mouth daily. 90 tablet 3    RSV vaccine, bivalent, ABRYSVO, injection Inject 0.5 mLs into the muscle once for 1 dose. Pharmacist administered 0.5 mL 0    Tdap, tetanus-diptheria-acell pertussis, (BOOSTRIX) 5-2.5-18.5 LF-MCG/0.5 JARET injection Inject 0.5 mLs into the muscle once for 1 dose. 0.5 mL 0     No current facility-administered medications for this visit.               Review of Systems  Constitutional, HEENT, cardiovascular, pulmonary, gi and gu systems are negative, except as otherwise noted.      Objective    /72 (BP Location: Right arm, Patient Position: Sitting, Cuff Size: Adult Large)    "Pulse 75   Temp 98  F (36.7  C) (Tympanic)   Resp 14   Ht 1.88 m (6' 2\")   Wt 105.7 kg (233 lb 1.6 oz)   SpO2 97%   BMI 29.93 kg/m    Body mass index is 29.93 kg/m .  Physical Exam   Alert distress he has a 6 cm circular rash on his left hand with elevated bleeding border central clearing and some scaling.            Signed Electronically by: Quinn Al MD    "

## 2024-10-15 NOTE — LETTER
October 16, 2024      Rob Agrawal   [facility] [place] [address]        Dear ,    We are writing to inform you of your test results.    Your test results fall within the expected range(s) or remain unchanged from previous results.  Please continue with current treatment plan.    Resulted Orders   Basic metabolic panel   Result Value Ref Range    Sodium 139 135 - 145 mmol/L    Potassium 4.2 3.4 - 5.3 mmol/L    Chloride 106 98 - 107 mmol/L    Carbon Dioxide (CO2) 22 22 - 29 mmol/L    Anion Gap 11 7 - 15 mmol/L    Urea Nitrogen 22.6 8.0 - 23.0 mg/dL    Creatinine 1.00 0.67 - 1.17 mg/dL    GFR Estimate 82 >60 mL/min/1.73m2      Comment:      eGFR calculated using 2021 CKD-EPI equation.    Calcium 9.4 8.8 - 10.4 mg/dL      Comment:      Reference intervals for this test were updated on 7/16/2024 to reflect our healthy population more accurately. There may be differences in the flagging of prior results with similar values performed with this method. Those prior results can be interpreted in the context of the updated reference intervals.    Glucose 90 70 - 99 mg/dL    Patient Fasting > 8hrs? No    Lipid panel reflex to direct LDL Fasting   Result Value Ref Range    Cholesterol 189 <200 mg/dL    Triglycerides 76 <150 mg/dL    Direct Measure HDL 53 >=40 mg/dL    LDL Cholesterol Calculated 121 (H) <100 mg/dL    Non HDL Cholesterol 136 (H) <130 mg/dL    Patient Fasting > 8hrs? No     Narrative    Cholesterol  Desirable: < 200 mg/dL  Borderline High: 200 - 239 mg/dL  High: >= 240 mg/dL    Triglycerides  Normal: < 150 mg/dL  Borderline High: 150 - 199 mg/dL  High: 200-499 mg/dL  Very High: >= 500 mg/dL    Direct Measure HDL  Female: >= 50 mg/dL   Male: >= 40 mg/dL    LDL Cholesterol  Desirable: < 100 mg/dL  Above Desirable: 100 - 129 mg/dL   Borderline High: 130 - 159 mg/dL   High:  160 - 189 mg/dL   Very High: >= 190 mg/dL    Non HDL Cholesterol  Desirable: < 130 mg/dL  Above Desirable: 130 - 159  mg/dL  Borderline High: 160 - 189 mg/dL  High: 190 - 219 mg/dL  Very High: >= 220 mg/dL       If you have any questions or concerns, please call the clinic at the number listed above.       Sincerely,      Quinn Al MD

## 2025-04-15 ENCOUNTER — OFFICE VISIT (OUTPATIENT)
Dept: FAMILY MEDICINE | Facility: CLINIC | Age: 70
End: 2025-04-15
Payer: COMMERCIAL

## 2025-04-15 VITALS
SYSTOLIC BLOOD PRESSURE: 125 MMHG | HEART RATE: 58 BPM | OXYGEN SATURATION: 97 % | RESPIRATION RATE: 16 BRPM | HEIGHT: 74 IN | DIASTOLIC BLOOD PRESSURE: 80 MMHG | WEIGHT: 234.2 LBS | TEMPERATURE: 98 F | BODY MASS INDEX: 30.06 KG/M2

## 2025-04-15 DIAGNOSIS — I25.2 HISTORY OF NON-ST ELEVATION MYOCARDIAL INFARCTION (NSTEMI): ICD-10-CM

## 2025-04-15 DIAGNOSIS — E78.5 ELEVATED LIPIDS: ICD-10-CM

## 2025-04-15 DIAGNOSIS — Z23 NEED FOR VACCINATION: ICD-10-CM

## 2025-04-15 DIAGNOSIS — I25.10 ATHEROSCLEROSIS OF CORONARY ARTERY OF NATIVE HEART WITHOUT ANGINA PECTORIS, UNSPECIFIED VESSEL OR LESION TYPE: ICD-10-CM

## 2025-04-15 DIAGNOSIS — J45.40 MODERATE PERSISTENT ASTHMA WITHOUT COMPLICATION: ICD-10-CM

## 2025-04-15 DIAGNOSIS — Z00.00 ENCOUNTER FOR MEDICARE ANNUAL WELLNESS EXAM: Primary | ICD-10-CM

## 2025-04-15 DIAGNOSIS — J45.909 UNCOMPLICATED ASTHMA, UNSPECIFIED ASTHMA SEVERITY, UNSPECIFIED WHETHER PERSISTENT: ICD-10-CM

## 2025-04-15 DIAGNOSIS — Z23 NEED FOR TDAP VACCINATION: ICD-10-CM

## 2025-04-15 DIAGNOSIS — I10 PRIMARY HYPERTENSION: ICD-10-CM

## 2025-04-15 LAB
CHOLEST SERPL-MCNC: 195 MG/DL
FASTING STATUS PATIENT QL REPORTED: YES
HDLC SERPL-MCNC: 57 MG/DL
LDLC SERPL CALC-MCNC: 129 MG/DL
NONHDLC SERPL-MCNC: 138 MG/DL
TRIGL SERPL-MCNC: 47 MG/DL

## 2025-04-15 PROCEDURE — 3074F SYST BP LT 130 MM HG: CPT | Performed by: FAMILY MEDICINE

## 2025-04-15 PROCEDURE — 91320 SARSCV2 VAC 30MCG TRS-SUC IM: CPT | Performed by: FAMILY MEDICINE

## 2025-04-15 PROCEDURE — G0439 PPPS, SUBSEQ VISIT: HCPCS | Performed by: FAMILY MEDICINE

## 2025-04-15 PROCEDURE — 36415 COLL VENOUS BLD VENIPUNCTURE: CPT | Performed by: FAMILY MEDICINE

## 2025-04-15 PROCEDURE — 3079F DIAST BP 80-89 MM HG: CPT | Performed by: FAMILY MEDICINE

## 2025-04-15 PROCEDURE — 83718 ASSAY OF LIPOPROTEIN: CPT | Performed by: FAMILY MEDICINE

## 2025-04-15 PROCEDURE — 90480 ADMN SARSCOV2 VAC 1/ONLY CMP: CPT | Performed by: FAMILY MEDICINE

## 2025-04-15 PROCEDURE — 82465 ASSAY BLD/SERUM CHOLESTEROL: CPT | Performed by: FAMILY MEDICINE

## 2025-04-15 RX ORDER — AMLODIPINE BESYLATE 5 MG/1
5 TABLET ORAL DAILY
Qty: 90 TABLET | Refills: 3 | Status: SHIPPED | OUTPATIENT
Start: 2025-04-15

## 2025-04-15 RX ORDER — ROSUVASTATIN CALCIUM 40 MG/1
40 TABLET, COATED ORAL DAILY
Qty: 90 TABLET | Refills: 3 | Status: SHIPPED | OUTPATIENT
Start: 2025-04-15

## 2025-04-15 RX ORDER — ALBUTEROL SULFATE 90 UG/1
2 INHALANT RESPIRATORY (INHALATION) EVERY 4 HOURS PRN
Qty: 18 G | Refills: 11 | Status: SHIPPED | OUTPATIENT
Start: 2025-04-15

## 2025-04-15 RX ORDER — FLUTICASONE PROPIONATE AND SALMETEROL 250; 50 UG/1; UG/1
1 POWDER RESPIRATORY (INHALATION) EVERY 12 HOURS
Qty: 180 EACH | Refills: 3 | Status: SHIPPED | OUTPATIENT
Start: 2025-04-15

## 2025-04-15 RX ORDER — NITROGLYCERIN 0.4 MG/1
TABLET SUBLINGUAL
Qty: 30 TABLET | Refills: 1 | Status: SHIPPED | OUTPATIENT
Start: 2025-04-15

## 2025-04-15 SDOH — HEALTH STABILITY: PHYSICAL HEALTH: ON AVERAGE, HOW MANY DAYS PER WEEK DO YOU ENGAGE IN MODERATE TO STRENUOUS EXERCISE (LIKE A BRISK WALK)?: 5 DAYS

## 2025-04-15 ASSESSMENT — ASTHMA QUESTIONNAIRES
ACT_TOTALSCORE: 20
QUESTION_4 LAST FOUR WEEKS HOW OFTEN HAVE YOU USED YOUR RESCUE INHALER OR NEBULIZER MEDICATION (SUCH AS ALBUTEROL): TWO OR THREE TIMES PER WEEK
QUESTION_2 LAST FOUR WEEKS HOW OFTEN HAVE YOU HAD SHORTNESS OF BREATH: THREE TO SIX TIMES A WEEK
QUESTION_1 LAST FOUR WEEKS HOW MUCH OF THE TIME DID YOUR ASTHMA KEEP YOU FROM GETTING AS MUCH DONE AT WORK, SCHOOL OR AT HOME: NONE OF THE TIME
QUESTION_5 LAST FOUR WEEKS HOW WOULD YOU RATE YOUR ASTHMA CONTROL: WELL CONTROLLED
QUESTION_3 LAST FOUR WEEKS HOW OFTEN DID YOUR ASTHMA SYMPTOMS (WHEEZING, COUGHING, SHORTNESS OF BREATH, CHEST TIGHTNESS OR PAIN) WAKE YOU UP AT NIGHT OR EARLIER THAN USUAL IN THE MORNING: NOT AT ALL

## 2025-04-15 ASSESSMENT — SOCIAL DETERMINANTS OF HEALTH (SDOH): HOW OFTEN DO YOU GET TOGETHER WITH FRIENDS OR RELATIVES?: THREE TIMES A WEEK

## 2025-04-15 NOTE — LETTER
My Asthma Action Plan    Name: Rob Agrawal   YOB: 1955  Date: 4/15/2025   My doctor: Delano Srinivasan MD   My clinic: [facility]        My Rescue Medicine: Albuterol (Proair/Ventolin/Proventil HFA) 2-4 puffs EVERY 4 HOURS as needed. Use a spacer if recommended by your provider.  My controller: Fluticasone/salmeterol twice a day   My Asthma Severity:   Moderate Persistent  Know your asthma triggers: upper respiratory infections             GREEN ZONE   Good Control  I feel good  No cough or wheeze  Can work, sleep and play without asthma symptoms       Take your asthma control medicine every day.     If exercise triggers your asthma, take your rescue medication  15 minutes before exercise or sports, and  During exercise if you have asthma symptoms  Spacer to use with inhaler: If you have a spacer, make sure to use it with your inhaler             YELLOW ZONE Getting Worse  I have ANY of these:  I do not feel good  Cough or wheeze  Chest feels tight  Wake up at night   Keep taking your Green Zone medications  Start taking your rescue medicine:  every 20 minutes for up to 1 hour. Then every 4 hours for 24-48 hours.  If you stay in the Yellow Zone for more than 12-24 hours, contact your doctor.  If you do not return to the Green Zone in 12-24 hours or you get worse, start taking your oral steroid medicine if prescribed by your provider.           RED ZONE Medical Alert - Get Help  I have ANY of these:  I feel awful  Medicine is not helping  Breathing getting harder  Trouble walking or talking  Nose opens wide to breathe       Take your rescue medicine NOW  If your provider has prescribed an oral steroid medicine, start taking it NOW  Call your doctor NOW  If you are still in the Red Zone after 20 minutes and you have not reached your doctor:  Take your rescue medicine again and  Call 911 or go to the emergency room right away    See your regular doctor within 2  weeks of an Emergency Room or Urgent Care visit for follow-up treatment.          Annual Reminders:  Meet with Asthma Educator,  Flu Shot in the Fall, consider Pneumonia Vaccination for patients with asthma (aged 19 and older).    Pharmacy:    [id number] - [facility] [address] AT [facility] [address]    Electronically signed by Delano Srinivasan MD   Date: 04/15/25                    Asthma Triggers  How To Control Things That Make Your Asthma Worse    Triggers are things that make your asthma worse.  Look at the list below to help you find your triggers and   what you can do about them. You can help prevent asthma flare-ups by staying away from your triggers.      Trigger                                                          What you can do   Cigarette Smoke  Tobacco smoke can make asthma worse. Do not allow smoking in your home, car or around you.  Be sure no one smokes at a child s day care or school.  If you smoke, ask your health care provider for ways to help you quit.  Ask family members to quit too.  Ask your health care provider for a referral to Quit Plan to help you quit smoking, or call 0-332-797-PLAN.     Colds, Flu, Bronchitis  These are common triggers of asthma. Wash your hands often.  Don t touch your eyes, nose or mouth.  Get a flu shot every year.     Dust Mites  These are tiny bugs that live in cloth or carpet. They are too small to see. Wash sheets and blankets in hot water every week.   Encase pillows and mattress in dust mite proof covers.  Avoid having carpet if you can. If you have carpet, vacuum weekly.   Use a dust mask and HEPA vacuum.   Pollen and Outdoor Mold  Some people are allergic to trees, grass, or weed pollen, or molds. Try to keep your windows closed.  Limit time out doors when pollen count is high.   Ask you health care provider about taking medicine during allergy  season.     Animal Dander  Some people are allergic to skin flakes, urine or saliva from pets with fur or feathers. Keep pets with fur or feathers out of your home.    If you can t keep the pet outdoors, then keep the pet out of your bedroom.  Keep the bedroom door closed.  Keep pets off cloth furniture and away from stuffed toys.     Mice, Rats, and Cockroaches  Some people are allergic to the waste from these pests.   Cover food and garbage.  Clean up spills and food crumbs.  Store grease in the refrigerator.   Keep food out of the bedroom.   Indoor Mold  This can be a trigger if your home has high moisture. Fix leaking faucets, pipes, or other sources of water.   Clean moldy surfaces.  Dehumidify basement if it is damp and smelly.   Smoke, Strong Odors, and Sprays  These can reduce air quality. Stay away from strong odors and sprays, such as perfume, powder, hair spray, paints, smoke incense, paint, cleaning products, candles and new carpet.   Exercise or Sports  Some people with asthma have this trigger. Be active!  Ask your doctor about taking medicine before sports or exercise to prevent symptoms.    Warm up for 5-10 minutes before and after sports or exercise.     Other Triggers of Asthma  Cold air:  Cover your nose and mouth with a scarf.  Sometimes laughing or crying can be a trigger.  Some medicines and food can trigger asthma.

## 2025-04-15 NOTE — PROGRESS NOTES
"Preventive Care Visit  [facility]  Delano Srinivasan MD, Family Medicine  Apr 15, 2025      Assessment & Plan   Problem List Items Addressed This Visit       History of non-ST elevation myocardial infarction (NSTEMI)    Relevant Orders    Lipid panel reflex to direct LDL Fasting (Completed)    Coronary atherosclerosis - Primary    Relevant Orders    Lipid panel reflex to direct LDL Fasting (Completed)    Asthma     Other Visit Diagnoses       Encounter for Medicare annual wellness exam        Need for Tdap vaccination        Need for vaccination        Relevant Orders    COVID-19 12+ (PFIZER) (Completed)           Patient is up-to-date in health maintenance, COVID was updated and an order Tdap was sent to his pharmacy.  A copy of an asthma action plan summarizing his medications was also attached to his after visit summary.  Medications were refilled for the next time she is due.  Provided that everything is within normal limits, recommend following up in 1 year for his annual exam.  I did recommend to make sure to add aerobic exercise to his routine.  I did repeat a lipid panel because of his last LDL being elevated.             BMI  Estimated body mass index is 30.07 kg/m  as calculated from the following:    Height as of this encounter: 1.88 m (6' 2\").    Weight as of this encounter: 106.2 kg (234 lb 3.2 oz).   Weight management plan: Discussed healthy diet and exercise guidelines    Counseling  Appropriate preventive services were addressed with this patient via screening, questionnaire, or discussion as appropriate for fall prevention, nutrition, physical activity, Tobacco-use cessation, social engagement, weight loss and cognition.  Checklist reviewing preventive services available has been given to the patient.  Reviewed patient's diet, addressing concerns and/or questions.   The patient was instructed to see the dentist every 6 months.   He is at risk for psychosocial " distress and has been provided with information to reduce risk.   The patient was provided with written information regarding signs of hearing loss.   Information on urinary incontinence and treatment options given to patient.           Jose Maria Rivas is a 69 year old, presenting for the following:  Physical (AWV/Establish care )        4/15/2025     9:39 AM   Additional Questions   Roomed by BRYCE Uriostegui           Rob is here to establish care and for his wellness examination otherwise he has no additional concerns.  For this visit , I reviewed previous records from external sources and ,  previous multiple  lab results.  He has history of high calcium score and a strong family history of coronary artery disease, currently on rosuvastatin at 40 mg nightly, his last lipid panel had an LDL that was a bit elevated.  He reports he is doing low impact exercises at home.  He quit smoking over a decade ago, he used to smoke cigars.  He is up-to-date on his colonoscopy which is usually every 5 years due to his history of polyps.               Advance Care Planning  Patient does not have a Health Care Directive: Patient states has Advance Directive and will bring in a copy to clinic.      4/15/2025   General Health   How would you rate your overall physical health? Good   Feel stress (tense, anxious, or unable to sleep) To some extent   (!) STRESS CONCERN      4/15/2025   Nutrition   Diet: Regular (no restrictions)    Low salt    Low fat/cholesterol       Multiple values from one day are sorted in reverse-chronological order         4/15/2025   Exercise   Days per week of moderate/strenous exercise 5 days         4/15/2025   Social Factors   Frequency of gathering with friends or relatives Three times a week   Worry food won't last until get money to buy more No   Food not last or not have enough money for food? No   Do you have housing? (Housing is defined as stable permanent housing and does not include staying  ouside in a car, in a tent, in an abandoned building, in an overnight shelter, or couch-surfing.) Yes   Are you worried about losing your housing? No   Lack of transportation? No   Unable to get utilities (heat,electricity)? No         4/15/2025   Fall Risk   Fallen 2 or more times in the past year? No   Trouble with walking or balance? No          4/15/2025   Activities of Daily Living- Home Safety   Needs help with the following daily activites None of the above   Safety concerns in the home None of the above         4/15/2025   Dental   Dentist two times every year? (!) NO         4/15/2025   Hearing Screening   Hearing concerns? (!) IT'S HARD TO FOLLOW A CONVERSATION IN A NOISY RESTAURANT OR CROWDED ROOM.    (!) TROUBLE FOLLOWING DIALOGUE IN THE THEATHER.    (!) TROUBLE UNDERSTANDING SPEECH ON THE TELEPHONE       Multiple values from one day are sorted in reverse-chronological order         4/15/2025   Driving Risk Screening   Patient/family members have concerns about driving No         4/15/2025   General Alertness/Fatigue Screening   Have you been more tired than usual lately? No         4/15/2025   Urinary Incontinence Screening   Bothered by leaking urine in past 6 months Yes           Today's PHQ-2 Score:       4/15/2025     9:18 AM   PHQ-2 ( 1999 Pf[employer])   Q1: Little interest or pleasure in doing things 0   Q2: Feeling down, depressed or hopeless 0   PHQ-2 Score 0    Q1: Little interest or pleasure in doing things Not at all   Q2: Feeling down, depressed or hopeless Not at all   PHQ-2 Score 0       Patient-reported           4/15/2025   Substance Use   Alcohol more than 3/day or more than 7/wk Not Applicable   Do you have a current opioid prescription? No   How severe/bad is pain from 1 to 10? 1/10   Do you use any other substances recreationally? (!) DECLINE     Social History     Tobacco Use    Smoking status: Former     Types: Cigars     Passive exposure: Past    Smokeless tobacco: Never   Vaping Use     Vaping status: Never Used   Substance Use Topics    Alcohol use: Not Currently     Comment: quit last week    Drug use: Not Currently     Types: Marijuana       Last PSA:   Prostate Specific Antigen Screen   Date Value Ref Range Status   11/17/2023 1.82 0.00 - 4.50 ng/mL Final   03/15/2022 2.15 0.00 - 4.00 ug/L Final     ASCVD Risk   The ASCVD Risk score (Demond GUILLORY, et al., 2019) failed to calculate for the following reasons:    Risk score cannot be calculated because patient has a medical history suggesting prior/existing ASCVD            Reviewed and updated as needed this visit by Provider                      Current providers sharing in care for this patient include:  Patient Care Team:  Delano Srinivasan MD as PCP - General (Family Medicine)  Quinn Al MD as Assigned PCP  Armen Jeff MD as MD (Urology)  Armen Jeff MD as MD (Urology)  Armen Jeff MD as Assigned Surgical Provider    The following health maintenance items are reviewed in Epic and correct as of today:  Health Maintenance   Topic Date Due    ASTHMA ACTION PLAN  Never done    RSV VACCINE (1 - Risk 60-74 years 1-dose series) Never done    DTAP/TDAP/TD IMMUNIZATION (2 - Td or Tdap) 05/05/2023    MEDICARE ANNUAL WELLNESS VISIT  06/20/2024    INFLUENZA VACCINE (1) 09/01/2024    COVID-19 Vaccine (6 - 2024-25 season) 09/01/2024    ANNUAL REVIEW OF HM ORDERS  11/17/2024    BMP  10/15/2025    LIPID  10/15/2025    ASTHMA CONTROL TEST  10/15/2025    FALL RISK ASSESSMENT  04/15/2026    COLORECTAL CANCER SCREENING  08/09/2026    DIABETES SCREENING  10/15/2027    ADVANCE CARE PLANNING  06/20/2028    PHQ-2 (once per calendar year)  Completed    Pneumococcal Vaccine: 50+ Years  Completed    ZOSTER IMMUNIZATION  Completed    AORTIC ANEURYSM SCREENING (SYSTEM ASSIGNED)  Completed    HPV IMMUNIZATION  Aged Out    MENINGITIS IMMUNIZATION  Aged Out    HEPATITIS C SCREENING  Discontinued    LUNG CANCER SCREENING   "Discontinued            Objective    Exam  /80   Pulse 58   Temp 98  F (36.7  C) (Oral)   Resp 16   Ht 1.88 m (6' 2\")   Wt 106.2 kg (234 lb 3.2 oz)   SpO2 97%   BMI 30.07 kg/m     Estimated body mass index is 30.07 kg/m  as calculated from the following:    Height as of this encounter: 1.88 m (6' 2\").    Weight as of this encounter: 106.2 kg (234 lb 3.2 oz).    Physical Exam  GENERAL: alert and no distress  EYES: Eyes grossly normal to inspection, PERRL and conjunctivae and sclerae normal  HENT: ear canals and TM's normal, nose and mouth without ulcers or lesions  NECK: no adenopathy, no asymmetry, masses, or scars  RESP: lungs clear to auscultation - no rales, rhonchi or wheezes  CV: regular rate and rhythm, normal S1 S2, no S3 or S4, no murmur, click or rub, no peripheral edema  ABDOMEN: soft, nontender, no hepatosplenomegaly, no masses and bowel sounds normal  MS: no gross musculoskeletal defects noted, no edema  SKIN: no suspicious lesions or rashes  NEURO: Normal strength and tone, mentation intact and speech normal  PSYCH: mentation appears normal, affect normal/bright         4/15/2025   Mini Cog   Clock Draw Score 2 Normal   3 Item Recall 2 objects recalled   Mini Cog Total Score 4              Signed Electronically by: Delano Srinivasan MD    "

## 2025-04-15 NOTE — RESULT ENCOUNTER NOTE
Let patient know that the LDL cholesterol was 129, where his risk factors is a little bit high but stable from the previous time.  I know that he is already on a maximum dose of his cholesterol medication, he has further room for improvement in the limiting fatty intake otherwise when he sees cardiology additional medications could be discussed.

## 2025-04-15 NOTE — PATIENT INSTRUCTIONS
It was great to meet you today.  We are getting another lipid panel to see where you are with your levels.  I am glad you are doing low-impact exercises make sure that you also incorporate aerobic exercises which can also be low impact like biking or a fixed bike , swimming etc.  Please ask your wife about the document for advance directives and if you have one please send us a copy.  Congratulations in quitting drinking.  I will send medication refills.  Let me know if you have any questions.        Please let us know if you have any questions via [employer] or you can call us too.      [facility] (Delano Srinivasan MD)

## 2025-04-16 ENCOUNTER — TELEPHONE (OUTPATIENT)
Dept: FAMILY MEDICINE | Facility: CLINIC | Age: 70
End: 2025-04-16
Payer: COMMERCIAL

## 2025-04-24 DIAGNOSIS — I10 PRIMARY HYPERTENSION: ICD-10-CM

## 2025-04-24 RX ORDER — LOSARTAN POTASSIUM 50 MG/1
50 TABLET ORAL DAILY
Qty: 90 TABLET | Refills: 3 | Status: SHIPPED | OUTPATIENT
Start: 2025-04-24